# Patient Record
Sex: MALE | Race: WHITE | NOT HISPANIC OR LATINO | Employment: OTHER | URBAN - METROPOLITAN AREA
[De-identification: names, ages, dates, MRNs, and addresses within clinical notes are randomized per-mention and may not be internally consistent; named-entity substitution may affect disease eponyms.]

---

## 2017-07-20 RX ORDER — METOCLOPRAMIDE 10 MG/1
TABLET ORAL
Qty: 120 TABLET | Refills: 4 | OUTPATIENT
Start: 2017-07-20

## 2017-07-25 RX ORDER — METOCLOPRAMIDE 10 MG/1
TABLET ORAL
Qty: 120 TABLET | Refills: 4 | OUTPATIENT
Start: 2017-07-25

## 2018-03-14 PROBLEM — M54.50 CHRONIC LOW BACK PAIN: Status: ACTIVE | Noted: 2018-03-14

## 2018-03-14 PROBLEM — K21.9 GASTROESOPHAGEAL REFLUX DISEASE WITHOUT ESOPHAGITIS: Status: ACTIVE | Noted: 2018-03-14

## 2018-03-14 PROBLEM — F32.9 REACTIVE DEPRESSION: Status: ACTIVE | Noted: 2018-03-14

## 2018-03-14 PROBLEM — E66.812 CLASS 2 OBESITY DUE TO EXCESS CALORIES WITHOUT SERIOUS COMORBIDITY IN ADULT: Status: ACTIVE | Noted: 2018-03-14

## 2018-03-14 PROBLEM — I10 ESSENTIAL HYPERTENSION: Status: ACTIVE | Noted: 2018-03-14

## 2018-03-14 PROBLEM — E66.09 CLASS 2 OBESITY DUE TO EXCESS CALORIES WITHOUT SERIOUS COMORBIDITY IN ADULT: Status: ACTIVE | Noted: 2018-03-14

## 2018-03-14 PROBLEM — E78.2 MIXED HYPERLIPIDEMIA: Status: ACTIVE | Noted: 2018-03-14

## 2018-03-14 PROBLEM — E55.9 VITAMIN D DEFICIENCY: Status: ACTIVE | Noted: 2018-03-14

## 2018-03-14 PROBLEM — G89.29 CHRONIC LOW BACK PAIN: Status: ACTIVE | Noted: 2018-03-14

## 2018-03-14 NOTE — PROGRESS NOTES
Encounter Date:03/16/2018    Location: [facility]    Wilfred Kim MD    Patient ID: Bala Staley III is a 57 y.o. male,  and resident of [place], [place].    1960  Subjective:      Chief Complaint/Reason for visit:    Chief Complaint   Patient presents with   • Hypertension     New Patient    • Shortness of Breath     on exertion   • PVC'S   • Edema     Feet and lower legs       Problem List:  1. Hypertension  2. Dyspnea on Exertion  A. History of normal Cardiolite stress test, EF 66 %, 6/22/11, TY Leahy MD  3. Hyperlipidemia  4. Obesity- BMI 35.2  5. GERD  6. Depression  7. Back Pain  8. Vitamin D Deficiency  9. Snoring/ possible ZAHRA    HPI: Mr. Staley is a 57 yr old white male who presents in consultation today referred by Dr. Kim for further evaluation of his Hypertension and shortness of breath.  Mr. Staley states that for the last 4-5 months when he climbs steps or attempts to walk and talk at the same time he has dyspnea. He also has had lower extremity edema for several years.  He denies any chest pain or pressure with exertion.  He was diagnosed with Hypertension approximately 10 years ago.  He has been on Amlodipine, HCTZ and Cozaar 25 mg daily until 2-3 months ago.  At that time Dr. Kim increased his Cozaar to 50 mg daily with no significant improvement in his B/P readings. He states his B/P runs 150/90 on a regular basis.    Social History     Social History   • Marital status:      Spouse name: N/A   • Number of children: N/A   • Years of education: N/A     Occupational History   • Not on file.     Social History Main Topics   • Smoking status: Never Smoker   • Smokeless tobacco: Never Used   • Alcohol use 1.2 oz/week     2 Shots of liquor per week      Comment: per day   • Drug use: No   • Sexual activity: Defer     Other Topics Concern   • Not on file     Social History Narrative   • No narrative on file       family history includes Alzheimer's  disease in his mother; Heart attack in his father; Heart disease in his father; No Known Problems in his sister.     has a past medical history of Chronic low back pain (3/14/2018); Essential hypertension (3/14/2018); Gastroesophageal reflux disease without esophagitis (3/14/2018); Hyperlipidemia; Reactive depression (3/14/2018); and Vitamin D deficiency (3/14/2018).    No Known Allergies      Current Outpatient Prescriptions:   •  amLODIPine (NORVASC) 5 MG tablet, Daily., Disp: , Rfl: 0  •  aspirin 81 MG EC tablet, Take 81 mg by mouth Daily., Disp: , Rfl:   •  atorvastatin (LIPITOR) 40 MG tablet, Daily., Disp: , Rfl: 0  •  calcium polycarbophil (FIBERCON) 625 MG tablet, Take 625 mg by mouth 4 (Four) Times a Day., Disp: , Rfl:   •  cetirizine (zyrTEC) 10 MG tablet, Take 10 mg by mouth Daily., Disp: , Rfl:   •  lansoprazole (PREVACID) 15 MG capsule, Take 15 mg by mouth 2 (Two) Times a Day., Disp: , Rfl:   •  losartan (COZAAR) 50 MG tablet, Daily., Disp: , Rfl: 0  •  metoclopramide (REGLAN) 10 MG tablet, TAKE 1 TABLET BY MOUTH BEFORE MEALS AND AT BEDTIME, Disp: 120 tablet, Rfl: 6  •  RA VITAMIN B-12 TR 1000 MCG tablet controlled-release, Daily., Disp: , Rfl: 0  •  tadalafil (CIALIS) 20 MG tablet, Take 20 mg by mouth Daily As Needed for erectile dysfunction., Disp: , Rfl:   •  TRINTELLIX 10 MG tablet, Daily., Disp: , Rfl: 0  •  vitamin D (ERGOCALCIFEROL) 19474 units capsule capsule, 1 (One) Time Per Week., Disp: , Rfl: 0  •  chlorthalidone (HYGROTON) 25 MG tablet, Take 1 tablet by mouth Daily., Disp: 90 tablet, Rfl: 3    Review of Systems   Constitution: Positive for weight gain.   HENT: Negative.    Eyes: Negative.    Cardiovascular: Positive for dyspnea on exertion, irregular heartbeat, leg swelling and palpitations. Negative for chest pain, near-syncope, orthopnea, paroxysmal nocturnal dyspnea and syncope.   Respiratory: Positive for sleep disturbances due to breathing and snoring.    Endocrine: Negative.   "  Hematologic/Lymphatic: Negative.    Skin: Negative.    Musculoskeletal: Positive for arthritis and back pain.   Gastrointestinal: Positive for constipation and heartburn.   Genitourinary: Negative.    Neurological: Negative.    Psychiatric/Behavioral: Negative.    Allergic/Immunologic: Negative.      Vitals:    03/16/18 1111   BP: 150/88   BP Location: Right arm   Patient Position: Sitting   Pulse: 102   Weight: 115 kg (252 lb 9.6 oz)   Height: 180.3 cm (71\")     Objective:       Physical Exam   Constitutional: He is oriented to person, place, and time. He appears well-developed and well-nourished.   HENT:   Head: Normocephalic and atraumatic.   Neck: Normal range of motion. No thyromegaly present.   Cardiovascular: Normal rate, regular rhythm, normal heart sounds and intact distal pulses.  Exam reveals no gallop and no friction rub.    No murmur heard.  Pulmonary/Chest: Effort normal and breath sounds normal.   Abdominal: Soft. Bowel sounds are normal.   Musculoskeletal: Normal range of motion. He exhibits edema.   Neurological: He is alert and oriented to person, place, and time.   Skin: Skin is warm and dry.   Psychiatric: He has a normal mood and affect. His behavior is normal. Judgment and thought content normal.   Vitals reviewed.    Data Review:     ECG 12 Lead  Date/Time: 3/16/2018 11:28 AM  Performed by: RIKKI JORDAN  Authorized by: RIKKI JORDAN   Comparison: compared with previous ECG from 6/22/2011  Rhythm: sinus tachycardia  Rate: tachycardic  BPM: 102  QRS axis: normal  Clinical impression: normal ECG  Comments: Poor R wave progression             Assessment:      Diagnosis Plan   1. Essential hypertension  Discontinue HCTZ- Add Chlorthalidone   2. LAMAS (dyspnea on exertion)  Stress Test With Myocardial Perfusion (1 Day)   3. Mixed hyperlipidemia  Continue Statin   4. Class 2 obesity due to excess calories with serious comorbidity and body mass index (BMI) of 35.0 to 35.9 in adult  Diet and " Exercise   5. Snoring  Overnight Sleep Oximetry Study     Plan:   1. Discontinue HCTZ  2. Add Chlorthalidone 25 mg po daily.  3. Overnight Oximetry to evaluate for ZAHRA  4. Cardiolite GXT for further evaluation of exertional dyspnea.  5. Follow-up in 6 months  6. DASH Diet  7. Compression Stockings     Scribed for Pawel Leahy MD by Janneth Whelan. 3/16/2018  12:05 PM    I, Pawel Leahy MD, personally performed the services described in this documentation as scribed by the above named individual in my presence, and it is both accurate and complete.  3/16/2018  1:19 PM      Please note that portions of this note may have been completed with a voice recognition program. Efforts were made to edit the dictations, but occasionally words are mistranscribed.

## 2018-03-16 ENCOUNTER — OFFICE VISIT (OUTPATIENT)
Dept: CARDIOLOGY | Facility: CLINIC | Age: 58
End: 2018-03-16

## 2018-03-16 VITALS
HEART RATE: 102 BPM | SYSTOLIC BLOOD PRESSURE: 150 MMHG | WEIGHT: 252.6 LBS | BODY MASS INDEX: 35.36 KG/M2 | HEIGHT: 71 IN | DIASTOLIC BLOOD PRESSURE: 88 MMHG

## 2018-03-16 DIAGNOSIS — R06.83 SNORING: ICD-10-CM

## 2018-03-16 DIAGNOSIS — R06.09 DOE (DYSPNEA ON EXERTION): ICD-10-CM

## 2018-03-16 DIAGNOSIS — IMO0001 CLASS 2 OBESITY DUE TO EXCESS CALORIES WITH SERIOUS COMORBIDITY AND BODY MASS INDEX (BMI) OF 35.0 TO 35.9 IN ADULT: ICD-10-CM

## 2018-03-16 DIAGNOSIS — E78.2 MIXED HYPERLIPIDEMIA: ICD-10-CM

## 2018-03-16 DIAGNOSIS — I10 ESSENTIAL HYPERTENSION: Primary | ICD-10-CM

## 2018-03-16 PROCEDURE — 93000 ELECTROCARDIOGRAM COMPLETE: CPT | Performed by: INTERNAL MEDICINE

## 2018-03-16 PROCEDURE — 99244 OFF/OP CNSLTJ NEW/EST MOD 40: CPT | Performed by: INTERNAL MEDICINE

## 2018-03-16 RX ORDER — CHLORTHALIDONE 25 MG/1
25 TABLET ORAL DAILY
Qty: 90 TABLET | Refills: 3 | Status: SHIPPED | OUTPATIENT
Start: 2018-03-16 | End: 2018-11-14 | Stop reason: HOSPADM

## 2018-03-16 RX ORDER — LANSOPRAZOLE 15 MG/1
15 CAPSULE, DELAYED RELEASE ORAL 2 TIMES DAILY
COMMUNITY
End: 2018-11-13 | Stop reason: CLARIF

## 2018-03-16 RX ORDER — AMLODIPINE BESYLATE 5 MG/1
TABLET ORAL DAILY
Refills: 0 | COMMUNITY
Start: 2018-02-21 | End: 2018-11-13 | Stop reason: CLARIF

## 2018-03-16 RX ORDER — LOSARTAN POTASSIUM 50 MG/1
TABLET ORAL DAILY
Refills: 0 | COMMUNITY
Start: 2018-03-02 | End: 2018-11-13 | Stop reason: CLARIF

## 2018-03-16 RX ORDER — VORTIOXETINE 10 MG/1
TABLET, FILM COATED ORAL DAILY
Refills: 0 | COMMUNITY
Start: 2018-02-28 | End: 2019-01-13 | Stop reason: SDUPTHER

## 2018-03-16 RX ORDER — ATORVASTATIN CALCIUM 40 MG/1
TABLET, FILM COATED ORAL DAILY
Refills: 0 | COMMUNITY
Start: 2018-03-11 | End: 2019-11-13 | Stop reason: SDUPTHER

## 2018-03-16 RX ORDER — TADALAFIL 20 MG/1
20 TABLET ORAL DAILY PRN
COMMUNITY
End: 2018-11-13

## 2018-03-16 RX ORDER — PSYLLIUM HUSK 3.4 G/7G
POWDER ORAL DAILY
Refills: 0 | COMMUNITY
Start: 2018-03-11 | End: 2019-04-15 | Stop reason: SDUPTHER

## 2018-03-16 RX ORDER — CALCIUM POLYCARBOPHIL 625 MG 625 MG/1
625 TABLET ORAL 4 TIMES DAILY
COMMUNITY
End: 2020-09-18

## 2018-03-16 RX ORDER — HYDROCHLOROTHIAZIDE 25 MG/1
TABLET ORAL DAILY
Refills: 0 | COMMUNITY
Start: 2018-03-12 | End: 2018-03-16 | Stop reason: ALTCHOICE

## 2018-03-16 RX ORDER — ASPIRIN 81 MG/1
81 TABLET ORAL DAILY
COMMUNITY
End: 2023-02-08 | Stop reason: SDUPTHER

## 2018-03-16 RX ORDER — CETIRIZINE HYDROCHLORIDE 10 MG/1
10 TABLET ORAL DAILY
COMMUNITY
End: 2022-03-07

## 2018-03-16 RX ORDER — ERGOCALCIFEROL 1.25 MG/1
CAPSULE ORAL WEEKLY
Refills: 0 | COMMUNITY
Start: 2018-02-17 | End: 2019-06-03 | Stop reason: SDUPTHER

## 2018-04-10 ENCOUNTER — HOSPITAL ENCOUNTER (OUTPATIENT)
Dept: CARDIOLOGY | Facility: HOSPITAL | Age: 58
Discharge: HOME | End: 2018-04-10
Attending: INTERNAL MEDICINE

## 2018-04-10 ENCOUNTER — TELEPHONE (OUTPATIENT)
Dept: CARDIOLOGY | Facility: CLINIC | Age: 58
End: 2018-04-10

## 2018-04-10 VITALS
BODY MASS INDEX: 35.28 KG/M2 | HEIGHT: 71 IN | SYSTOLIC BLOOD PRESSURE: 138 MMHG | DIASTOLIC BLOOD PRESSURE: 80 MMHG | HEART RATE: 105 BPM | WEIGHT: 252 LBS

## 2018-04-10 DIAGNOSIS — R06.09 DOE (DYSPNEA ON EXERTION): ICD-10-CM

## 2018-04-10 LAB
BH CV STRESS BP STAGE 1: NORMAL
BH CV STRESS BP STAGE 2: NORMAL
BH CV STRESS DURATION MIN STAGE 1: 3
BH CV STRESS DURATION MIN STAGE 2: 3
BH CV STRESS DURATION MIN STAGE 3: 2
BH CV STRESS DURATION SEC STAGE 1: 0
BH CV STRESS DURATION SEC STAGE 2: 0
BH CV STRESS DURATION SEC STAGE 3: 40
BH CV STRESS GRADE STAGE 1: 10
BH CV STRESS GRADE STAGE 2: 12
BH CV STRESS GRADE STAGE 3: 14
BH CV STRESS HR STAGE 1: 122
BH CV STRESS HR STAGE 2: 136
BH CV STRESS HR STAGE 3: 144
BH CV STRESS METS STAGE 1: 5
BH CV STRESS METS STAGE 2: 7.5
BH CV STRESS METS STAGE 3: 10
BH CV STRESS O2 STAGE 1: 100
BH CV STRESS O2 STAGE 2: 100
BH CV STRESS O2 STAGE 3: 100
BH CV STRESS PROTOCOL 1: NORMAL
BH CV STRESS RECOVERY BP: NORMAL MMHG
BH CV STRESS RECOVERY HR: 104 BPM
BH CV STRESS SPEED STAGE 1: 1.7
BH CV STRESS SPEED STAGE 2: 2.5
BH CV STRESS SPEED STAGE 3: 3.4
BH CV STRESS STAGE 1: 1
BH CV STRESS STAGE 2: 2
BH CV STRESS STAGE 3: 3
LV EF NUC BP: 68 %
MAXIMAL PREDICTED HEART RATE: 163 BPM
PERCENT MAX PREDICTED HR: 88.34 %
STRESS BASELINE BP: NORMAL MMHG
STRESS BASELINE HR: 97 BPM
STRESS PERCENT HR: 104 %
STRESS POST ESTIMATED WORKLOAD: 9.8 METS
STRESS POST EXERCISE DUR MIN: 8 MIN
STRESS POST EXERCISE DUR SEC: 40 SEC
STRESS POST PEAK BP: NORMAL MMHG
STRESS POST PEAK HR: 144 BPM
STRESS TARGET HR: 139 BPM

## 2018-04-10 PROCEDURE — 78452 HT MUSCLE IMAGE SPECT MULT: CPT

## 2018-04-10 PROCEDURE — 0 TECHNETIUM SESTAMIBI: Performed by: INTERNAL MEDICINE

## 2018-04-10 PROCEDURE — 78452 HT MUSCLE IMAGE SPECT MULT: CPT | Performed by: INTERNAL MEDICINE

## 2018-04-10 PROCEDURE — 93017 CV STRESS TEST TRACING ONLY: CPT

## 2018-04-10 PROCEDURE — 93018 CV STRESS TEST I&R ONLY: CPT | Performed by: INTERNAL MEDICINE

## 2018-04-10 PROCEDURE — A9500 TC99M SESTAMIBI: HCPCS | Performed by: INTERNAL MEDICINE

## 2018-04-10 RX ADMIN — TECHNETIUM TC 99M SESTAMIBI 1 DOSE: 1 INJECTION INTRAVENOUS at 10:50

## 2018-04-10 RX ADMIN — TECHNETIUM TC 99M SESTAMIBI 1 DOSE: 1 INJECTION INTRAVENOUS at 13:45

## 2018-04-10 NOTE — TELEPHONE ENCOUNTER
Called patient to let him know that his overnight ox did not suggest he is high risk for sleep apnea and no further testing is needed at this time. A letter with these results was mailed on march 30th so we apologize if he did not receive

## 2018-11-12 ENCOUNTER — HOSPITAL ENCOUNTER (OUTPATIENT)
Facility: HOSPITAL | Age: 58
Setting detail: OBSERVATION
Discharge: HOME | End: 2018-11-14
Attending: EMERGENCY MEDICINE | Admitting: INTERNAL MEDICINE

## 2018-11-12 ENCOUNTER — APPOINTMENT (OUTPATIENT)
Dept: GENERAL RADIOLOGY | Facility: HOSPITAL | Age: 58
End: 2018-11-12

## 2018-11-12 DIAGNOSIS — R55 SYNCOPE, UNSPECIFIED SYNCOPE TYPE: ICD-10-CM

## 2018-11-12 DIAGNOSIS — R05.9 COUGH: Primary | ICD-10-CM

## 2018-11-12 DIAGNOSIS — N28.9 RENAL INSUFFICIENCY: ICD-10-CM

## 2018-11-12 LAB
ALBUMIN SERPL-MCNC: 4.47 G/DL (ref 3.2–4.8)
ALBUMIN/GLOB SERPL: 1.8 G/DL (ref 1.5–2.5)
ALP SERPL-CCNC: 101 U/L (ref 25–100)
ALT SERPL W P-5'-P-CCNC: 64 U/L (ref 7–40)
ANION GAP SERPL CALCULATED.3IONS-SCNC: 12 MMOL/L (ref 3–11)
AST SERPL-CCNC: 71 U/L (ref 0–33)
BASOPHILS # BLD AUTO: 0.03 10*3/MM3 (ref 0–0.2)
BASOPHILS NFR BLD AUTO: 0.7 % (ref 0–1)
BILIRUB SERPL-MCNC: 0.4 MG/DL (ref 0.3–1.2)
BUN BLD-MCNC: 17 MG/DL (ref 9–23)
BUN/CREAT SERPL: 13 (ref 7–25)
CALCIUM SPEC-SCNC: 9.2 MG/DL (ref 8.7–10.4)
CHLORIDE SERPL-SCNC: 100 MMOL/L (ref 99–109)
CO2 SERPL-SCNC: 22 MMOL/L (ref 20–31)
CREAT BLD-MCNC: 1.31 MG/DL (ref 0.6–1.3)
DEPRECATED RDW RBC AUTO: 51.5 FL (ref 37–54)
EOSINOPHIL # BLD AUTO: 0.12 10*3/MM3 (ref 0–0.3)
EOSINOPHIL NFR BLD AUTO: 2.6 % (ref 0–3)
ERYTHROCYTE [DISTWIDTH] IN BLOOD BY AUTOMATED COUNT: 17.7 % (ref 11.3–14.5)
FLUAV AG NPH QL: NEGATIVE
FLUBV AG NPH QL IA: NEGATIVE
GFR SERPL CREATININE-BSD FRML MDRD: 56 ML/MIN/1.73
GLOBULIN UR ELPH-MCNC: 2.4 GM/DL
GLUCOSE BLD-MCNC: 103 MG/DL (ref 70–100)
HCT VFR BLD AUTO: 37.9 % (ref 38.9–50.9)
HGB BLD-MCNC: 12 G/DL (ref 13.1–17.5)
HOLD SPECIMEN: NORMAL
HOLD SPECIMEN: NORMAL
IMM GRANULOCYTES # BLD: 0 10*3/MM3 (ref 0–0.03)
IMM GRANULOCYTES NFR BLD: 0 % (ref 0–0.6)
LYMPHOCYTES # BLD AUTO: 0.65 10*3/MM3 (ref 0.6–4.8)
LYMPHOCYTES NFR BLD AUTO: 14.3 % (ref 24–44)
MCH RBC QN AUTO: 25.3 PG (ref 27–31)
MCHC RBC AUTO-ENTMCNC: 31.7 G/DL (ref 32–36)
MCV RBC AUTO: 79.8 FL (ref 80–99)
MONOCYTES # BLD AUTO: 0.67 10*3/MM3 (ref 0–1)
MONOCYTES NFR BLD AUTO: 14.8 % (ref 0–12)
NEUTROPHILS # BLD AUTO: 3.07 10*3/MM3 (ref 1.5–8.3)
NEUTROPHILS NFR BLD AUTO: 67.6 % (ref 41–71)
PLATELET # BLD AUTO: 167 10*3/MM3 (ref 150–450)
PMV BLD AUTO: 10.6 FL (ref 6–12)
POTASSIUM BLD-SCNC: 3.2 MMOL/L (ref 3.5–5.5)
PROT SERPL-MCNC: 6.9 G/DL (ref 5.7–8.2)
RBC # BLD AUTO: 4.75 10*6/MM3 (ref 4.2–5.76)
SODIUM BLD-SCNC: 134 MMOL/L (ref 132–146)
WBC NRBC COR # BLD: 4.54 10*3/MM3 (ref 3.5–10.8)
WHOLE BLOOD HOLD SPECIMEN: NORMAL
WHOLE BLOOD HOLD SPECIMEN: NORMAL

## 2018-11-12 PROCEDURE — 99284 EMERGENCY DEPT VISIT MOD MDM: CPT

## 2018-11-12 PROCEDURE — 93005 ELECTROCARDIOGRAM TRACING: CPT

## 2018-11-12 PROCEDURE — 36415 COLL VENOUS BLD VENIPUNCTURE: CPT

## 2018-11-12 PROCEDURE — 87798 DETECT AGENT NOS DNA AMP: CPT | Performed by: EMERGENCY MEDICINE

## 2018-11-12 PROCEDURE — 96360 HYDRATION IV INFUSION INIT: CPT

## 2018-11-12 PROCEDURE — 93005 ELECTROCARDIOGRAM TRACING: CPT | Performed by: EMERGENCY MEDICINE

## 2018-11-12 PROCEDURE — 84484 ASSAY OF TROPONIN QUANT: CPT | Performed by: EMERGENCY MEDICINE

## 2018-11-12 PROCEDURE — 80053 COMPREHEN METABOLIC PANEL: CPT | Performed by: EMERGENCY MEDICINE

## 2018-11-12 PROCEDURE — 85025 COMPLETE CBC W/AUTO DIFF WBC: CPT | Performed by: EMERGENCY MEDICINE

## 2018-11-12 PROCEDURE — 71045 X-RAY EXAM CHEST 1 VIEW: CPT

## 2018-11-12 PROCEDURE — 87804 INFLUENZA ASSAY W/OPTIC: CPT | Performed by: EMERGENCY MEDICINE

## 2018-11-12 RX ORDER — ACETAMINOPHEN 500 MG
1000 TABLET ORAL ONCE
Status: COMPLETED | OUTPATIENT
Start: 2018-11-12 | End: 2018-11-12

## 2018-11-12 RX ORDER — BENZONATATE 100 MG/1
200 CAPSULE ORAL ONCE
Status: COMPLETED | OUTPATIENT
Start: 2018-11-12 | End: 2018-11-12

## 2018-11-12 RX ORDER — SODIUM CHLORIDE 0.9 % (FLUSH) 0.9 %
10 SYRINGE (ML) INJECTION AS NEEDED
Status: DISCONTINUED | OUTPATIENT
Start: 2018-11-12 | End: 2018-11-14 | Stop reason: HOSPADM

## 2018-11-12 RX ADMIN — BENZONATATE 200 MG: 100 CAPSULE ORAL at 22:44

## 2018-11-12 RX ADMIN — ACETAMINOPHEN 1000 MG: 500 TABLET, FILM COATED ORAL at 22:43

## 2018-11-12 RX ADMIN — SODIUM CHLORIDE 1000 ML: 9 INJECTION, SOLUTION INTRAVENOUS at 22:41

## 2018-11-13 ENCOUNTER — APPOINTMENT (OUTPATIENT)
Dept: NEUROLOGY | Facility: HOSPITAL | Age: 58
End: 2018-11-13
Attending: FAMILY MEDICINE

## 2018-11-13 ENCOUNTER — APPOINTMENT (OUTPATIENT)
Dept: CT IMAGING | Facility: HOSPITAL | Age: 58
End: 2018-11-13

## 2018-11-13 ENCOUNTER — APPOINTMENT (OUTPATIENT)
Dept: CARDIOLOGY | Facility: HOSPITAL | Age: 58
End: 2018-11-13
Attending: FAMILY MEDICINE

## 2018-11-13 PROBLEM — R55 SYNCOPE, TUSSIVE: Status: ACTIVE | Noted: 2018-11-13

## 2018-11-13 PROBLEM — R05.4 SYNCOPE, TUSSIVE: Status: ACTIVE | Noted: 2018-11-13

## 2018-11-13 PROBLEM — R05.4 TUSSIVE SYNCOPE: Status: ACTIVE | Noted: 2018-11-13

## 2018-11-13 PROBLEM — R55 TUSSIVE SYNCOPE: Status: ACTIVE | Noted: 2018-11-13

## 2018-11-13 PROBLEM — E87.6 HYPOKALEMIA: Status: ACTIVE | Noted: 2018-11-13

## 2018-11-13 LAB
ANION GAP SERPL CALCULATED.3IONS-SCNC: 9 MMOL/L (ref 3–11)
BH CV ECHO MEAS - AO ROOT AREA (BSA CORRECTED): 1.1
BH CV ECHO MEAS - AO ROOT AREA: 4.8 CM^2
BH CV ECHO MEAS - AO ROOT DIAM: 2.5 CM
BH CV ECHO MEAS - BSA(HAYCOCK): 2.4 M^2
BH CV ECHO MEAS - BSA: 2.3 M^2
BH CV ECHO MEAS - BZI_BMI: 35.2 KILOGRAMS/M^2
BH CV ECHO MEAS - BZI_METRIC_HEIGHT: 177.8 CM
BH CV ECHO MEAS - BZI_METRIC_WEIGHT: 111.1 KG
BH CV ECHO MEAS - EDV(CUBED): 130.7 ML
BH CV ECHO MEAS - EDV(TEICH): 122.4 ML
BH CV ECHO MEAS - EF(CUBED): 75.5 %
BH CV ECHO MEAS - EF(TEICH): 67.1 %
BH CV ECHO MEAS - ESV(CUBED): 32 ML
BH CV ECHO MEAS - ESV(TEICH): 40.2 ML
BH CV ECHO MEAS - FS: 37.4 %
BH CV ECHO MEAS - IVS/LVPW: 0.99
BH CV ECHO MEAS - IVSD: 1.2 CM
BH CV ECHO MEAS - LA DIMENSION: 3.9 CM
BH CV ECHO MEAS - LA/AO: 1.6
BH CV ECHO MEAS - LAD MAJOR: 5 CM
BH CV ECHO MEAS - LAT PEAK E' VEL: 10.7 CM/SEC
BH CV ECHO MEAS - LATERAL E/E' RATIO: 8.8
BH CV ECHO MEAS - LV MASS(C)D: 246 GRAMS
BH CV ECHO MEAS - LV MASS(C)DI: 108.1 GRAMS/M^2
BH CV ECHO MEAS - LVIDD: 5.1 CM
BH CV ECHO MEAS - LVIDS: 3.2 CM
BH CV ECHO MEAS - LVPWD: 1.2 CM
BH CV ECHO MEAS - MED PEAK E' VEL: 7.4 CM/SEC
BH CV ECHO MEAS - MEDIAL E/E' RATIO: 12.7
BH CV ECHO MEAS - MV A MAX VEL: 83.9 CM/SEC
BH CV ECHO MEAS - MV DEC SLOPE: 351.7 CM/SEC^2
BH CV ECHO MEAS - MV DEC TIME: 0.21 SEC
BH CV ECHO MEAS - MV E MAX VEL: 95.8 CM/SEC
BH CV ECHO MEAS - MV E/A: 1.1
BH CV ECHO MEAS - MV P1/2T MAX VEL: 95.6 CM/SEC
BH CV ECHO MEAS - MV P1/2T: 79.6 MSEC
BH CV ECHO MEAS - MVA P1/2T LCG: 2.3 CM^2
BH CV ECHO MEAS - MVA(P1/2T): 2.8 CM^2
BH CV ECHO MEAS - PA ACC SLOPE: 730.3 CM/SEC^2
BH CV ECHO MEAS - PA ACC TIME: 0.11 SEC
BH CV ECHO MEAS - PA PR(ACCEL): 27.9 MMHG
BH CV ECHO MEAS - PI END-D VEL: 111.8 CM/SEC
BH CV ECHO MEAS - RV MAX PG: 2.1 MMHG
BH CV ECHO MEAS - RV V1 MAX: 72.1 CM/SEC
BH CV ECHO MEAS - SI(CUBED): 43.4 ML/M^2
BH CV ECHO MEAS - SI(TEICH): 36.1 ML/M^2
BH CV ECHO MEAS - SV(CUBED): 98.7 ML
BH CV ECHO MEAS - SV(TEICH): 82.2 ML
BH CV ECHO MEAS - TAPSE (>1.6): 2.8 CM2
BH CV ECHO MEASUREMENTS AVERAGE E/E' RATIO: 10.59
BH CV XLRA - RV BASE: 4.9 CM
BH CV XLRA - RV LENGTH: 8.6 CM
BH CV XLRA - RV MID: 4.3 CM
BH CV XLRA - TDI S': 16.8 CM/SEC
BH CV XLRA MEAS LEFT CCA RATIO VEL: 93.3 CM/SEC
BH CV XLRA MEAS LEFT DIST CCA EDV: 21.6 CM/SEC
BH CV XLRA MEAS LEFT DIST CCA PSV: 94.3 CM/SEC
BH CV XLRA MEAS LEFT DIST ICA EDV: 30.2 CM/SEC
BH CV XLRA MEAS LEFT DIST ICA PSV: 67.9 CM/SEC
BH CV XLRA MEAS LEFT ICA RATIO VEL: 84.2 CM/SEC
BH CV XLRA MEAS LEFT ICA/CCA RATIO: 0.9
BH CV XLRA MEAS LEFT MID CCA EDV: 20.1 CM/SEC
BH CV XLRA MEAS LEFT MID CCA PSV: 134.5 CM/SEC
BH CV XLRA MEAS LEFT MID ICA EDV: 29.5 CM/SEC
BH CV XLRA MEAS LEFT MID ICA PSV: 84.9 CM/SEC
BH CV XLRA MEAS LEFT PROX CCA EDV: 33.9 CM/SEC
BH CV XLRA MEAS LEFT PROX CCA PSV: 193.6 CM/SEC
BH CV XLRA MEAS LEFT PROX ECA PSV: 144.4 CM/SEC
BH CV XLRA MEAS LEFT PROX ICA EDV: 22.8 CM/SEC
BH CV XLRA MEAS LEFT PROX ICA PSV: 66.8 CM/SEC
BH CV XLRA MEAS LEFT PROX SCLA PSV: 198.6 CM/SEC
BH CV XLRA MEAS LEFT VERTEBRAL A PSV: 26.9 CM/SEC
BH CV XLRA MEAS RIGHT CCA RATIO VEL: 94.3 CM/SEC
BH CV XLRA MEAS RIGHT DIST CCA EDV: 34.9 CM/SEC
BH CV XLRA MEAS RIGHT DIST CCA PSV: 95.2 CM/SEC
BH CV XLRA MEAS RIGHT DIST ICA EDV: 28.6 CM/SEC
BH CV XLRA MEAS RIGHT DIST ICA PSV: 64.9 CM/SEC
BH CV XLRA MEAS RIGHT ICA RATIO VEL: 88.7 CM/SEC
BH CV XLRA MEAS RIGHT ICA/CCA RATIO: 0.9
BH CV XLRA MEAS RIGHT MID CCA EDV: 24.6 CM/SEC
BH CV XLRA MEAS RIGHT MID CCA PSV: 113.9 CM/SEC
BH CV XLRA MEAS RIGHT MID ICA EDV: 35.7 CM/SEC
BH CV XLRA MEAS RIGHT MID ICA PSV: 72.6 CM/SEC
BH CV XLRA MEAS RIGHT PROX CCA EDV: 16.7 CM/SEC
BH CV XLRA MEAS RIGHT PROX CCA PSV: 150.3 CM/SEC
BH CV XLRA MEAS RIGHT PROX ECA PSV: 137.1 CM/SEC
BH CV XLRA MEAS RIGHT PROX ICA EDV: 25.8 CM/SEC
BH CV XLRA MEAS RIGHT PROX ICA PSV: 89.4 CM/SEC
BH CV XLRA MEAS RIGHT PROX SCLA PSV: 124.7 CM/SEC
BH CV XLRA MEAS RIGHT VERTEBRAL A PSV: 42.5 CM/SEC
BUN BLD-MCNC: 14 MG/DL (ref 9–23)
BUN/CREAT SERPL: 13.3 (ref 7–25)
CALCIUM SPEC-SCNC: 8.9 MG/DL (ref 8.7–10.4)
CHLORIDE SERPL-SCNC: 97 MMOL/L (ref 99–109)
CO2 SERPL-SCNC: 27 MMOL/L (ref 20–31)
CREAT BLD-MCNC: 1.05 MG/DL (ref 0.6–1.3)
GFR SERPL CREATININE-BSD FRML MDRD: 73 ML/MIN/1.73
GLUCOSE BLD-MCNC: 98 MG/DL (ref 70–100)
HBA1C MFR BLD: 5.7 % (ref 4.8–5.6)
LEFT ATRIUM VOLUME INDEX: 21.1 ML/M^2
LEFT ATRIUM VOLUME: 48 CM3
MAGNESIUM SERPL-MCNC: 1.7 MG/DL (ref 1.3–2.7)
MAGNESIUM SERPL-MCNC: 1.8 MG/DL (ref 1.3–2.7)
MAXIMAL PREDICTED HEART RATE: 162 BPM
POTASSIUM BLD-SCNC: 3.2 MMOL/L (ref 3.5–5.5)
POTASSIUM BLD-SCNC: 3.7 MMOL/L (ref 3.5–5.5)
S PYO AG THROAT QL: NEGATIVE
SODIUM BLD-SCNC: 133 MMOL/L (ref 132–146)
STRESS TARGET HR: 138 BPM
TROPONIN I SERPL-MCNC: 0.01 NG/ML
TROPONIN I SERPL-MCNC: 0.02 NG/ML
TSH SERPL DL<=0.05 MIU/L-ACNC: 2.28 MIU/ML (ref 0.35–5.35)

## 2018-11-13 PROCEDURE — 83036 HEMOGLOBIN GLYCOSYLATED A1C: CPT | Performed by: FAMILY MEDICINE

## 2018-11-13 PROCEDURE — 25010000002 AZITHROMYCIN: Performed by: FAMILY MEDICINE

## 2018-11-13 PROCEDURE — 87254 VIRUS INOCULATION SHELL VIA: CPT | Performed by: FAMILY MEDICINE

## 2018-11-13 PROCEDURE — 84484 ASSAY OF TROPONIN QUANT: CPT | Performed by: FAMILY MEDICINE

## 2018-11-13 PROCEDURE — 84484 ASSAY OF TROPONIN QUANT: CPT | Performed by: EMERGENCY MEDICINE

## 2018-11-13 PROCEDURE — 83735 ASSAY OF MAGNESIUM: CPT | Performed by: INTERNAL MEDICINE

## 2018-11-13 PROCEDURE — 99219 PR INITIAL OBSERVATION CARE/DAY 50 MINUTES: CPT | Performed by: FAMILY MEDICINE

## 2018-11-13 PROCEDURE — 25010000002 IOPAMIDOL 61 % SOLUTION: Performed by: FAMILY MEDICINE

## 2018-11-13 PROCEDURE — G0378 HOSPITAL OBSERVATION PER HR: HCPCS

## 2018-11-13 PROCEDURE — 87880 STREP A ASSAY W/OPTIC: CPT | Performed by: INTERNAL MEDICINE

## 2018-11-13 PROCEDURE — 84132 ASSAY OF SERUM POTASSIUM: CPT | Performed by: INTERNAL MEDICINE

## 2018-11-13 PROCEDURE — 93306 TTE W/DOPPLER COMPLETE: CPT | Performed by: INTERNAL MEDICINE

## 2018-11-13 PROCEDURE — 93005 ELECTROCARDIOGRAM TRACING: CPT | Performed by: EMERGENCY MEDICINE

## 2018-11-13 PROCEDURE — 80048 BASIC METABOLIC PNL TOTAL CA: CPT | Performed by: INTERNAL MEDICINE

## 2018-11-13 PROCEDURE — 93880 EXTRACRANIAL BILAT STUDY: CPT | Performed by: INTERNAL MEDICINE

## 2018-11-13 PROCEDURE — 71260 CT THORAX DX C+: CPT

## 2018-11-13 PROCEDURE — 95816 EEG AWAKE AND DROWSY: CPT

## 2018-11-13 PROCEDURE — 87081 CULTURE SCREEN ONLY: CPT | Performed by: INTERNAL MEDICINE

## 2018-11-13 PROCEDURE — 93306 TTE W/DOPPLER COMPLETE: CPT

## 2018-11-13 PROCEDURE — 25010000002 HEPARIN (PORCINE) PER 1000 UNITS: Performed by: FAMILY MEDICINE

## 2018-11-13 PROCEDURE — 93880 EXTRACRANIAL BILAT STUDY: CPT

## 2018-11-13 PROCEDURE — 83735 ASSAY OF MAGNESIUM: CPT | Performed by: FAMILY MEDICINE

## 2018-11-13 PROCEDURE — 84443 ASSAY THYROID STIM HORMONE: CPT | Performed by: FAMILY MEDICINE

## 2018-11-13 PROCEDURE — 96372 THER/PROPH/DIAG INJ SC/IM: CPT

## 2018-11-13 RX ORDER — ESOMEPRAZOLE MAGNESIUM 40 MG/1
1 FOR SUSPENSION ORAL NIGHTLY
COMMUNITY

## 2018-11-13 RX ORDER — BENZONATATE 100 MG/1
200 CAPSULE ORAL ONCE
Status: COMPLETED | OUTPATIENT
Start: 2018-11-13 | End: 2018-11-13

## 2018-11-13 RX ORDER — POTASSIUM CHLORIDE 1.5 G/1.77G
40 POWDER, FOR SOLUTION ORAL AS NEEDED
Status: DISCONTINUED | OUTPATIENT
Start: 2018-11-13 | End: 2018-11-14 | Stop reason: HOSPADM

## 2018-11-13 RX ORDER — AZITHROMYCIN 250 MG/1
250 TABLET, FILM COATED ORAL
Status: DISCONTINUED | OUTPATIENT
Start: 2018-11-14 | End: 2018-11-14 | Stop reason: HOSPADM

## 2018-11-13 RX ORDER — POTASSIUM CHLORIDE 750 MG/1
40 CAPSULE, EXTENDED RELEASE ORAL AS NEEDED
Status: DISCONTINUED | OUTPATIENT
Start: 2018-11-13 | End: 2018-11-14 | Stop reason: HOSPADM

## 2018-11-13 RX ORDER — MAGNESIUM SULFATE HEPTAHYDRATE 40 MG/ML
2 INJECTION, SOLUTION INTRAVENOUS AS NEEDED
Status: DISCONTINUED | OUTPATIENT
Start: 2018-11-13 | End: 2018-11-14 | Stop reason: HOSPADM

## 2018-11-13 RX ORDER — BENZONATATE 100 MG/1
200 CAPSULE ORAL 3 TIMES DAILY
Status: DISCONTINUED | OUTPATIENT
Start: 2018-11-13 | End: 2018-11-14 | Stop reason: HOSPADM

## 2018-11-13 RX ORDER — SODIUM CHLORIDE 0.9 % (FLUSH) 0.9 %
3 SYRINGE (ML) INJECTION EVERY 12 HOURS SCHEDULED
Status: DISCONTINUED | OUTPATIENT
Start: 2018-11-13 | End: 2018-11-14 | Stop reason: HOSPADM

## 2018-11-13 RX ORDER — OLMESARTAN MEDOXOMIL 20 MG/1
20 TABLET ORAL DAILY
COMMUNITY
End: 2019-05-02 | Stop reason: SDUPTHER

## 2018-11-13 RX ORDER — LOSARTAN POTASSIUM 50 MG/1
50 TABLET ORAL
Status: DISCONTINUED | OUTPATIENT
Start: 2018-11-13 | End: 2018-11-14 | Stop reason: HOSPADM

## 2018-11-13 RX ORDER — HEPARIN SODIUM 5000 [USP'U]/ML
5000 INJECTION, SOLUTION INTRAVENOUS; SUBCUTANEOUS EVERY 8 HOURS SCHEDULED
Status: DISCONTINUED | OUTPATIENT
Start: 2018-11-13 | End: 2018-11-14 | Stop reason: HOSPADM

## 2018-11-13 RX ORDER — SODIUM CHLORIDE 9 MG/ML
50 INJECTION, SOLUTION INTRAVENOUS CONTINUOUS
Status: ACTIVE | OUTPATIENT
Start: 2018-11-13 | End: 2018-11-13

## 2018-11-13 RX ORDER — LOSARTAN POTASSIUM 25 MG/1
25 TABLET ORAL
Status: DISCONTINUED | OUTPATIENT
Start: 2018-11-13 | End: 2018-11-13

## 2018-11-13 RX ORDER — PANTOPRAZOLE SODIUM 40 MG/1
40 TABLET, DELAYED RELEASE ORAL
Status: DISCONTINUED | OUTPATIENT
Start: 2018-11-13 | End: 2018-11-14 | Stop reason: HOSPADM

## 2018-11-13 RX ORDER — POTASSIUM CHLORIDE 7.45 MG/ML
10 INJECTION INTRAVENOUS
Status: DISCONTINUED | OUTPATIENT
Start: 2018-11-13 | End: 2018-11-14 | Stop reason: HOSPADM

## 2018-11-13 RX ORDER — SODIUM CHLORIDE 0.9 % (FLUSH) 0.9 %
3-10 SYRINGE (ML) INJECTION AS NEEDED
Status: DISCONTINUED | OUTPATIENT
Start: 2018-11-13 | End: 2018-11-14 | Stop reason: HOSPADM

## 2018-11-13 RX ORDER — MAGNESIUM SULFATE HEPTAHYDRATE 40 MG/ML
4 INJECTION, SOLUTION INTRAVENOUS AS NEEDED
Status: DISCONTINUED | OUTPATIENT
Start: 2018-11-13 | End: 2018-11-14 | Stop reason: HOSPADM

## 2018-11-13 RX ADMIN — IOPAMIDOL 90 ML: 612 INJECTION, SOLUTION INTRAVENOUS at 04:20

## 2018-11-13 RX ADMIN — HYDROCODONE POLISTIREX AND CHLORPHENIRAMINE POLISTIREX 5 ML: 10; 8 SUSPENSION, EXTENDED RELEASE ORAL at 20:52

## 2018-11-13 RX ADMIN — SODIUM CHLORIDE 500 ML: 9 INJECTION, SOLUTION INTRAVENOUS at 13:12

## 2018-11-13 RX ADMIN — SODIUM CHLORIDE 50 ML/HR: 9 INJECTION, SOLUTION INTRAVENOUS at 13:13

## 2018-11-13 RX ADMIN — HEPARIN SODIUM 5000 UNITS: 5000 INJECTION, SOLUTION INTRAVENOUS; SUBCUTANEOUS at 20:52

## 2018-11-13 RX ADMIN — POTASSIUM CHLORIDE 40 MEQ: 1.5 POWDER, FOR SOLUTION ORAL at 09:02

## 2018-11-13 RX ADMIN — HEPARIN SODIUM 5000 UNITS: 5000 INJECTION, SOLUTION INTRAVENOUS; SUBCUTANEOUS at 06:16

## 2018-11-13 RX ADMIN — PANTOPRAZOLE SODIUM 40 MG: 40 TABLET, DELAYED RELEASE ORAL at 13:27

## 2018-11-13 RX ADMIN — POTASSIUM CHLORIDE 40 MEQ: 1.5 POWDER, FOR SOLUTION ORAL at 13:12

## 2018-11-13 RX ADMIN — BENZONATATE 200 MG: 100 CAPSULE ORAL at 15:14

## 2018-11-13 RX ADMIN — BENZONATATE 200 MG: 100 CAPSULE ORAL at 20:53

## 2018-11-13 RX ADMIN — POTASSIUM CHLORIDE 40 MEQ: 1.5 POWDER, FOR SOLUTION ORAL at 04:32

## 2018-11-13 RX ADMIN — HEPARIN SODIUM 5000 UNITS: 5000 INJECTION, SOLUTION INTRAVENOUS; SUBCUTANEOUS at 13:12

## 2018-11-13 RX ADMIN — Medication 3 ML: at 20:53

## 2018-11-13 RX ADMIN — LOSARTAN POTASSIUM 50 MG: 50 TABLET ORAL at 13:27

## 2018-11-13 RX ADMIN — BENZONATATE 200 MG: 100 CAPSULE ORAL at 10:27

## 2018-11-13 RX ADMIN — AZITHROMYCIN MONOHYDRATE 500 MG: 500 INJECTION, POWDER, LYOPHILIZED, FOR SOLUTION INTRAVENOUS at 04:33

## 2018-11-14 VITALS
HEART RATE: 77 BPM | TEMPERATURE: 99.3 F | SYSTOLIC BLOOD PRESSURE: 163 MMHG | DIASTOLIC BLOOD PRESSURE: 100 MMHG | WEIGHT: 245 LBS | RESPIRATION RATE: 16 BRPM | HEIGHT: 71 IN | OXYGEN SATURATION: 94 % | BODY MASS INDEX: 34.3 KG/M2

## 2018-11-14 LAB
ANION GAP SERPL CALCULATED.3IONS-SCNC: 6 MMOL/L (ref 3–11)
BUN BLD-MCNC: 11 MG/DL (ref 9–23)
BUN/CREAT SERPL: 10.2 (ref 7–25)
CALCIUM SPEC-SCNC: 9.1 MG/DL (ref 8.7–10.4)
CHLORIDE SERPL-SCNC: 99 MMOL/L (ref 99–109)
CO2 SERPL-SCNC: 29 MMOL/L (ref 20–31)
CREAT BLD-MCNC: 1.08 MG/DL (ref 0.6–1.3)
GFR SERPL CREATININE-BSD FRML MDRD: 70 ML/MIN/1.73
GLUCOSE BLD-MCNC: 97 MG/DL (ref 70–100)
MAGNESIUM SERPL-MCNC: 1.9 MG/DL (ref 1.3–2.7)
POTASSIUM BLD-SCNC: 3.5 MMOL/L (ref 3.5–5.5)
SODIUM BLD-SCNC: 134 MMOL/L (ref 132–146)

## 2018-11-14 PROCEDURE — G0378 HOSPITAL OBSERVATION PER HR: HCPCS

## 2018-11-14 PROCEDURE — 80048 BASIC METABOLIC PNL TOTAL CA: CPT | Performed by: INTERNAL MEDICINE

## 2018-11-14 PROCEDURE — 99217 PR OBSERVATION CARE DISCHARGE MANAGEMENT: CPT | Performed by: INTERNAL MEDICINE

## 2018-11-14 PROCEDURE — 25010000002 HEPARIN (PORCINE) PER 1000 UNITS: Performed by: FAMILY MEDICINE

## 2018-11-14 PROCEDURE — 83735 ASSAY OF MAGNESIUM: CPT | Performed by: INTERNAL MEDICINE

## 2018-11-14 PROCEDURE — 96372 THER/PROPH/DIAG INJ SC/IM: CPT

## 2018-11-14 RX ORDER — AZITHROMYCIN 250 MG/1
TABLET, FILM COATED ORAL
Qty: 4 TABLET | Refills: 0 | Status: SHIPPED | OUTPATIENT
Start: 2018-11-15 | End: 2019-02-15

## 2018-11-14 RX ADMIN — AZITHROMYCIN 250 MG: 250 TABLET, FILM COATED ORAL at 06:08

## 2018-11-14 RX ADMIN — HEPARIN SODIUM 5000 UNITS: 5000 INJECTION, SOLUTION INTRAVENOUS; SUBCUTANEOUS at 06:08

## 2018-11-14 RX ADMIN — Medication 3 ML: at 07:45

## 2018-11-14 RX ADMIN — LOSARTAN POTASSIUM 50 MG: 50 TABLET ORAL at 07:45

## 2018-11-14 RX ADMIN — PANTOPRAZOLE SODIUM 40 MG: 40 TABLET, DELAYED RELEASE ORAL at 06:08

## 2018-11-14 RX ADMIN — BENZONATATE 200 MG: 100 CAPSULE ORAL at 07:45

## 2018-11-14 RX ADMIN — POTASSIUM CHLORIDE 40 MEQ: 1.5 POWDER, FOR SOLUTION ORAL at 09:31

## 2018-11-15 LAB — BACTERIA SPEC AEROBE CULT: NORMAL

## 2018-11-16 LAB
FLUAV AG NPH QL: NEGATIVE
FLUBV AG NPH QL: NEGATIVE
HADV SPEC QL CULT: NEGATIVE
HPIV1 RNA SPEC QL NAA+PROBE: NEGATIVE
HPIV2 SPEC QL CULT: NEGATIVE
HPIV3 SPEC QL CULT: NEGATIVE
RSV AG SPEC QL: POSITIVE

## 2018-11-17 LAB
B PARAPERT DNA SPEC QL NAA+PROBE: NEGATIVE
B PERT DNA SPEC QL NAA+PROBE: NEGATIVE

## 2019-01-14 RX ORDER — VORTIOXETINE 10 MG/1
TABLET, FILM COATED ORAL
Qty: 30 TABLET | Refills: 0 | Status: SHIPPED | OUTPATIENT
Start: 2019-01-14 | End: 2019-02-10 | Stop reason: SDUPTHER

## 2019-01-17 ENCOUNTER — LAB REQUISITION (OUTPATIENT)
Dept: LAB | Facility: HOSPITAL | Age: 59
End: 2019-01-17

## 2019-01-17 ENCOUNTER — OUTSIDE FACILITY SERVICE (OUTPATIENT)
Dept: GASTROENTEROLOGY | Facility: CLINIC | Age: 59
End: 2019-01-17

## 2019-01-17 DIAGNOSIS — K21.00 GASTRO-ESOPHAGEAL REFLUX DISEASE WITH ESOPHAGITIS: ICD-10-CM

## 2019-01-17 DIAGNOSIS — K20.0 EOSINOPHILIC ESOPHAGITIS: ICD-10-CM

## 2019-01-17 DIAGNOSIS — K44.9 DIAPHRAGMATIC HERNIA WITHOUT OBSTRUCTION OR GANGRENE: ICD-10-CM

## 2019-01-17 PROCEDURE — 88305 TISSUE EXAM BY PATHOLOGIST: CPT | Performed by: INTERNAL MEDICINE

## 2019-01-17 PROCEDURE — 43239 EGD BIOPSY SINGLE/MULTIPLE: CPT | Performed by: INTERNAL MEDICINE

## 2019-01-17 PROCEDURE — G0500 MOD SEDAT ENDO SERVICE >5YRS: HCPCS | Performed by: INTERNAL MEDICINE

## 2019-01-18 LAB
CYTO UR: NORMAL
LAB AP CASE REPORT: NORMAL
LAB AP CLINICAL INFORMATION: NORMAL
PATH REPORT.FINAL DX SPEC: NORMAL
PATH REPORT.GROSS SPEC: NORMAL

## 2019-02-11 RX ORDER — METOCLOPRAMIDE 10 MG/1
TABLET ORAL
Qty: 120 TABLET | Refills: 1 | OUTPATIENT
Start: 2019-02-11

## 2019-02-11 RX ORDER — VORTIOXETINE 10 MG/1
TABLET, FILM COATED ORAL
Qty: 30 TABLET | Refills: 5 | Status: SHIPPED | OUTPATIENT
Start: 2019-02-11 | End: 2019-08-16

## 2019-02-13 PROBLEM — J30.9 ALLERGIC RHINITIS: Status: ACTIVE | Noted: 2019-02-13

## 2019-02-13 PROBLEM — D50.9 IRON DEFICIENCY ANEMIA: Status: ACTIVE | Noted: 2019-02-13

## 2019-02-13 PROBLEM — R55 VASOVAGAL SYNCOPE: Status: ACTIVE | Noted: 2019-02-13

## 2019-02-13 PROBLEM — E78.00 HYPERCHOLESTEROLEMIA: Status: ACTIVE | Noted: 2019-02-13

## 2019-02-13 PROBLEM — R05.9 COUGH: Status: ACTIVE | Noted: 2019-02-13

## 2019-02-13 RX ORDER — METOCLOPRAMIDE 10 MG/1
TABLET ORAL
Qty: 120 TABLET | Refills: 1 | Status: SHIPPED | OUTPATIENT
Start: 2019-02-13 | End: 2019-06-08 | Stop reason: SDUPTHER

## 2019-02-15 ENCOUNTER — OFFICE VISIT (OUTPATIENT)
Dept: INTERNAL MEDICINE | Facility: CLINIC | Age: 59
End: 2019-02-15

## 2019-02-15 VITALS
HEART RATE: 88 BPM | HEIGHT: 71 IN | DIASTOLIC BLOOD PRESSURE: 70 MMHG | SYSTOLIC BLOOD PRESSURE: 124 MMHG | BODY MASS INDEX: 34.58 KG/M2 | WEIGHT: 247 LBS

## 2019-02-15 DIAGNOSIS — I10 ESSENTIAL HYPERTENSION: ICD-10-CM

## 2019-02-15 DIAGNOSIS — R55 COUGH SYNCOPE: Primary | ICD-10-CM

## 2019-02-15 DIAGNOSIS — R05.4 COUGH SYNCOPE: Primary | ICD-10-CM

## 2019-02-15 PROCEDURE — 99213 OFFICE O/P EST LOW 20 MIN: CPT | Performed by: INTERNAL MEDICINE

## 2019-02-15 NOTE — PROGRESS NOTES
[facility]     Bala Staley III  1960   [id number]      Patient Care Team:  Wilfred Kim MD as PCP - General (Internal Medicine)    Chief Complaint::   Chief Complaint   Patient presents with   • Hypertension   • Hyperlipidemia   • Heartburn   • Anemia        HPI  Mr Staley comes in for follow up of his hypertension and cough syncope.  EGD was apparently negative.  He continues to have a daytime cough, which at times makes him lightheaded, and has made him pass out.      Chronic Conditions:      Patient Active Problem List   Diagnosis   • Essential hypertension   • Mixed hyperlipidemia   • Class 2 obesity due to excess calories without serious comorbidity in adult   • Reactive depression   • Gastroesophageal reflux disease without esophagitis   • Vitamin D deficiency   • Chronic low back pain   • Dyspnea on exertion   • Syncope, tussive   • Hypokalemia   • Cough syncope   • Allergic rhinitis   • Cough   • Hypercholesterolemia   • Iron deficiency anemia   • Vasovagal syncope        Past Medical History:   Diagnosis Date   • Anemia     iron deficiency   • Chronic low back pain 3/14/2018   • Colonic polyp    • ED (erectile dysfunction)    • Esophagitis    • Essential hypertension 3/14/2018   • Gastroesophageal reflux disease without esophagitis 3/14/2018   • Hernia of abdominal cavity    • Hyperlipidemia    • Reactive depression 3/14/2018   • Tennis elbow    • Vitamin D deficiency 3/14/2018       Past Surgical History:   Procedure Laterality Date   • APPENDECTOMY     • CARDIAC CATHETERIZATION  2011   • ELBOW ARTHROPLASTY      Right    • ENDOSCOPY  2011   • ENDOSCOPY  2008    with biopsy   • FOOT SURGERY      left foot        Family History   Problem Relation Age of Onset   • Alzheimer's disease Mother    • Heart disease Father    • Heart attack Father    • Colon cancer Father    • Coronary artery disease Father    • No Known Problems Sister    • Cancer Paternal Grandmother    • Lung cancer  Paternal Grandmother        Social History     Socioeconomic History   • Marital status:      Spouse name: Not on file   • Number of children: Not on file   • Years of education: Not on file   • Highest education level: Not on file   Social Needs   • Financial resource strain: Not on file   • Food insecurity - worry: Not on file   • Food insecurity - inability: Not on file   • Transportation needs - medical: Not on file   • Transportation needs - non-medical: Not on file   Occupational History   • Not on file   Tobacco Use   • Smoking status: Never Smoker   • Smokeless tobacco: Never Used   Substance and Sexual Activity   • Alcohol use: Yes     Alcohol/week: 1.2 oz     Types: 2 Shots of liquor per week     Comment: per day, [place]    • Drug use: No   • Sexual activity: Yes     Partners: Female   Other Topics Concern   • Not on file   Social History Narrative   • Not on file       Allergies   Allergen Reactions   • Codeine Unknown (See Comments)     Not specified   • Sulfa Antibiotics Unknown (See Comments)     Not specified         Current Outpatient Medications:   •  aspirin 81 MG EC tablet, Take 81 mg by mouth Daily., Disp: , Rfl:   •  atorvastatin (LIPITOR) 40 MG tablet, Daily., Disp: , Rfl: 0  •  calcium polycarbophil (FIBERCON) 625 MG tablet, Take 625 mg by mouth 4 (Four) Times a Day., Disp: , Rfl:   •  cetirizine (zyrTEC) 10 MG tablet, Take 10 mg by mouth Daily., Disp: , Rfl:   •  Esomeprazole Magnesium (NEXIUM PO), Take  by mouth., Disp: , Rfl:   •  metoclopramide (REGLAN) 10 MG tablet, TAKE 1 TABLET BY MOUTH AT MEAL TIME AND TAKE 1 TABLET BY MOUTH AT BEDTIME, Disp: 120 tablet, Rfl: 1  •  Naphazoline-Pheniramine (OPCON-A) 0.027-0.315 % solution, Apply 1 drop to eye(s) as directed by provider Daily., Disp: , Rfl:   •  olmesartan (BENICAR) 20 MG tablet, Take 20 mg by mouth Daily., Disp: , Rfl:   •  RA VITAMIN B-12 TR 1000 MCG tablet controlled-release, Daily., Disp: , Rfl: 0  •  TRINTELLIX 10 MG tablet,  "take 1 tablet by mouth AT THE SAME TIME EACH DAY, Disp: 30 tablet, Rfl: 5  •  vitamin D (ERGOCALCIFEROL) 82118 units capsule capsule, 1 (One) Time Per Week., Disp: , Rfl: 0    Review of Systems   Constitutional: Negative.  Negative for chills, fatigue and fever.   HENT: Negative for congestion, ear pain and sinus pressure.    Respiratory: Positive for cough and wheezing. Negative for chest tightness and shortness of breath.    Cardiovascular: Negative.  Negative for chest pain and palpitations.   Gastrointestinal: Negative for abdominal pain, blood in stool, constipation and diarrhea.   Skin: Negative for color change.   Allergic/Immunologic: Negative for environmental allergies.   Neurological: Positive for dizziness. Negative for speech difficulty and headache.   Psychiatric/Behavioral: Negative for decreased concentration. The patient is not nervous/anxious.         Vital Signs  Vitals:    02/15/19 1547   BP: 124/70   BP Location: Left arm   Patient Position: Sitting   Cuff Size: Adult   Pulse: 88   Weight: 112 kg (247 lb)   Height: 180.3 cm (70.98\")       Physical Exam   Constitutional: He is oriented to person, place, and time. He appears well-developed and well-nourished.   HENT:   Head: Normocephalic and atraumatic.   Cardiovascular: Normal rate, regular rhythm and normal heart sounds.   No murmur heard.  Pulmonary/Chest: Effort normal and breath sounds normal.   Neurological: He is alert and oriented to person, place, and time.   Psychiatric: He has a normal mood and affect.   Vitals reviewed.     Procedures      Assessment/Plan:    1)  Cough syncope, negative imaging, overnight hospitalization.  Refer to pulmonary.   2)  HTN controlled.     Plan of care reviewed with patient at the conclusion of today's visit. Education was provided regarding diagnosis, management, and any prescribed or recommended OTC medications.Patient verbalizes understanding of and agreement with management plan.         Wilfred FLORES" MD Julio

## 2019-03-21 ENCOUNTER — OFFICE VISIT (OUTPATIENT)
Dept: PULMONOLOGY | Facility: CLINIC | Age: 59
End: 2019-03-21

## 2019-03-21 VITALS
SYSTOLIC BLOOD PRESSURE: 120 MMHG | HEIGHT: 71 IN | WEIGHT: 249.6 LBS | OXYGEN SATURATION: 94 % | TEMPERATURE: 98.2 F | DIASTOLIC BLOOD PRESSURE: 76 MMHG | HEART RATE: 103 BPM | BODY MASS INDEX: 34.94 KG/M2

## 2019-03-21 DIAGNOSIS — K21.9 GASTROESOPHAGEAL REFLUX DISEASE WITHOUT ESOPHAGITIS: ICD-10-CM

## 2019-03-21 DIAGNOSIS — R05.4 COUGH SYNCOPE: ICD-10-CM

## 2019-03-21 DIAGNOSIS — R05.9 COUGH: Primary | ICD-10-CM

## 2019-03-21 DIAGNOSIS — R55 COUGH SYNCOPE: ICD-10-CM

## 2019-03-21 DIAGNOSIS — K46.9 HERNIA OF ABDOMINAL CAVITY: ICD-10-CM

## 2019-03-21 PROCEDURE — 99204 OFFICE O/P NEW MOD 45 MIN: CPT | Performed by: NURSE PRACTITIONER

## 2019-03-21 NOTE — PROGRESS NOTES
[facility] Pulmonary Evaluation    CHIEF COMPLAINT    Cough    Refered by:  Wilfred Kim, *        HISTORY OF PRESENT ILLNESS    Bala Staley III is a 58 y.o.male here today for worsening cough.  He does have a chronic cough that is been persistent over the years.  He actually saw us around 18 years ago for cough and underwent bronchoscopy and evaluation was found to have significant reflux causing the cough.  He continues to follow with gastroenterology, Dr. Harry.  He last had an EGD in November 2018.    He is on a PPI, Reglan twice daily, and sleeps in a wedge pillow.  He still has several episodes of reflux and even regurgitation.    Since around November 2018 he has had some worsening in his chronic cough to where he is actually become syncopal on several episodes.  He was admitted to the hospital in November secondary to the recurrent syncope, his echo and carotid ultrasounds were negative.  His CT scan chest did show some middle lobe nodular opacities.  His Bordetella pertussis was negative, he is also up-to-date on his Tdap.    No wheezing or shortness of air.  No chest pain chest pressure palpitations.  No lower extremity edema.  He does have some seasonal environmental allergies which he takes Zyrtec, the allergies  Causes itching.  He denies any significant postnasal drainage.    Patient Active Problem List   Diagnosis   • Essential hypertension   • Mixed hyperlipidemia   • Class 2 obesity due to excess calories without serious comorbidity in adult   • Reactive depression   • Gastroesophageal reflux disease without esophagitis   • Vitamin D deficiency   • Chronic low back pain   • Dyspnea on exertion   • Syncope, tussive   • Hypokalemia   • Cough syncope   • Allergic rhinitis   • Cough   • Hypercholesterolemia   • Iron deficiency anemia   • Vasovagal syncope   • Hernia of abdominal cavity       Allergies   Allergen Reactions   • Codeine Unknown (See Comments)     Not specified   • Sulfa Antibiotics  Unknown (See Comments)     Not specified       Current Outpatient Medications:   •  aspirin 81 MG EC tablet, Take 81 mg by mouth Daily., Disp: , Rfl:   •  atorvastatin (LIPITOR) 40 MG tablet, Daily., Disp: , Rfl: 0  •  calcium polycarbophil (FIBERCON) 625 MG tablet, Take 625 mg by mouth 4 (Four) Times a Day., Disp: , Rfl:   •  cetirizine (zyrTEC) 10 MG tablet, Take 10 mg by mouth Daily., Disp: , Rfl:   •  Esomeprazole Magnesium (NEXIUM PO), Take  by mouth., Disp: , Rfl:   •  metoclopramide (REGLAN) 10 MG tablet, TAKE 1 TABLET BY MOUTH AT MEAL TIME AND TAKE 1 TABLET BY MOUTH AT BEDTIME, Disp: 120 tablet, Rfl: 1  •  olmesartan (BENICAR) 20 MG tablet, Take 20 mg by mouth Daily., Disp: , Rfl:   •  RA VITAMIN B-12 TR 1000 MCG tablet controlled-release, Daily., Disp: , Rfl: 0  •  TRINTELLIX 10 MG tablet, take 1 tablet by mouth AT THE SAME TIME EACH DAY, Disp: 30 tablet, Rfl: 5  •  vitamin D (ERGOCALCIFEROL) 47214 units capsule capsule, 1 (One) Time Per Week., Disp: , Rfl: 0    MEDICATION LIST AND ALLERGIES REVIEWED.    Social History     Tobacco Use   • Smoking status: Never Smoker   • Smokeless tobacco: Never Used   Substance Use Topics   • Alcohol use: Yes     Alcohol/week: 1.2 oz     Types: 2 Shots of liquor per week     Comment: per day, [place]    • Drug use: No       FAMILY AND SOCIAL HISTORY REVIEWED AND UPDATED IN EPIC    Review of Systems   Constitutional: Negative for chills, fatigue, fever and unexpected weight change.   HENT: Negative for congestion, nosebleeds, postnasal drip, rhinorrhea, sinus pressure and trouble swallowing.    Eyes: Positive for itching (seasonal allergies).   Respiratory: Positive for cough. Negative for chest tightness, shortness of breath and wheezing.    Cardiovascular: Negative for chest pain and leg swelling.   Gastrointestinal: Negative for abdominal pain, constipation, diarrhea, nausea and vomiting.   Genitourinary: Negative for dysuria, frequency, hematuria and urgency.  "  Musculoskeletal: Negative for myalgias.   Allergic/Immunologic: Positive for environmental allergies.   Neurological: Positive for dizziness, syncope and light-headedness. Negative for weakness, numbness and headaches.   All other systems reviewed and are negative.  .    /76   Pulse 103   Temp 98.2 °F (36.8 °C)   Ht 179.1 cm (70.5\")   Wt 113 kg (249 lb 9.6 oz)   SpO2 94% Comment: resting, room air  BMI 35.31 kg/m²   Immunization History   Administered Date(s) Administered   • Flu Vaccine Intradermal Quad 18-64YR 10/25/2018   • Flu Vaccine Quad PF >18YRS 11/25/2015   • Flu Vaccine Quad PF >36MO 10/04/2017   • Influenza TIV (IM) 09/08/2009, 10/31/2014   • Influenza, Unspecified 10/25/2018   • Tdap 03/03/2011, 07/17/2014   • Zostavax 11/25/2015       Physical Exam   Constitutional: He is oriented to person, place, and time. He appears well-developed and well-nourished.   HENT:   Head: Normocephalic and atraumatic.   Eyes: EOM are normal. Pupils are equal, round, and reactive to light.   Neck: Normal range of motion. Neck supple.   Cardiovascular: Normal rate and regular rhythm.   No murmur heard.  Pulmonary/Chest: Effort normal and breath sounds normal. No respiratory distress. He has no wheezes. He has no rales.   Abdominal: Soft. Bowel sounds are normal. He exhibits no distension.   Musculoskeletal: Normal range of motion. He exhibits no edema.   Neurological: He is alert and oriented to person, place, and time.   Skin: Skin is warm and dry. No erythema.   Psychiatric: He has a normal mood and affect. His behavior is normal.   Vitals reviewed.      RESULTS    Lab Results   Component Value Date    WBC 4.54 11/12/2018    HGB 12.0 (L) 11/12/2018    HCT 37.9 (L) 11/12/2018    MCV 79.8 (L) 11/12/2018     11/12/2018     Lab Results   Component Value Date    GLUCOSE 97 11/14/2018    CALCIUM 9.1 11/14/2018     11/14/2018    K 3.5 11/14/2018    CO2 29.0 11/14/2018    CL 99 11/14/2018    BUN 11 " 11/14/2018    CREATININE 1.08 11/14/2018    EGFRIFNONA 70 11/14/2018    BCR 10.2 11/14/2018    ANIONGAP 6.0 11/14/2018       PFTs done in the office today, and read by me:  Unable to perform secondary to vasovagal and syncope with cough    PA/LAT CXR done in the office today, and reviewed by me:      PROBLEM LIST    Problem List Items Addressed This Visit        Cardiovascular and Mediastinum    Cough syncope       Respiratory    Cough - Primary    Relevant Orders    XR Chest PA & Lateral    CT Chest Hi Resolution       Digestive    Gastroesophageal reflux disease without esophagitis       Other    Hernia of abdominal cavity            DISCUSSION      We will continue to follow up on this chronic cough.  Most likely related to his chronic reflux disease.  He needs continued management and control of his reflux.  We again went over reflux precautions and today in the office, continue follow-up with gastroenterology.  He could be intermittently aspirating causing this recurrence of the worsening cough.    His workup was negative for his syncope, likely related to vasovagal response from his coughing.  At this time we does need to duke some cough suppression.  He has tried Tessalon Perles in the past without benefit, I will provide him some Tussionex to take at night.  I will try an inhaled steroid such as Asmanex to see if we get that airway inflammation to decrease in the cough to subside.  I a short amount use in the office today, and gave him written instructions.  Alternately some Tylenol No. 3 or codeine may be of benefit for cough suppression throughout the day.    I would like to follow-up on high-resolution CT scan to rule out any interstitial changes or bronchiectasis.  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    Follow-up in 2-3 weeks on the above testing.    Anna Andino, APRN  03/21/20193:42 PM  Electronically signed     Please note that portions of this  note were completed with a voice recognition program. Efforts were made to edit the dictations, but occasionally words are mistranscribed.      CC: Wilfred Kim MD

## 2019-03-28 ENCOUNTER — HOSPITAL ENCOUNTER (OUTPATIENT)
Dept: CT IMAGING | Facility: HOSPITAL | Age: 59
Discharge: HOME | End: 2019-03-28
Admitting: NURSE PRACTITIONER

## 2019-03-28 PROCEDURE — 71250 CT THORAX DX C-: CPT

## 2019-04-02 ENCOUNTER — TELEPHONE (OUTPATIENT)
Dept: INTERNAL MEDICINE | Facility: CLINIC | Age: 59
End: 2019-04-02

## 2019-04-04 ENCOUNTER — OFFICE VISIT (OUTPATIENT)
Dept: PULMONOLOGY | Facility: CLINIC | Age: 59
End: 2019-04-04

## 2019-04-04 VITALS
WEIGHT: 244 LBS | DIASTOLIC BLOOD PRESSURE: 70 MMHG | TEMPERATURE: 97.7 F | HEIGHT: 71 IN | OXYGEN SATURATION: 94 % | SYSTOLIC BLOOD PRESSURE: 120 MMHG | HEART RATE: 95 BPM | BODY MASS INDEX: 34.16 KG/M2

## 2019-04-04 DIAGNOSIS — R05.4 COUGH SYNCOPE: ICD-10-CM

## 2019-04-04 DIAGNOSIS — R55 COUGH SYNCOPE: ICD-10-CM

## 2019-04-04 DIAGNOSIS — R05.9 COUGH: Primary | ICD-10-CM

## 2019-04-04 DIAGNOSIS — J30.9 ALLERGIC RHINITIS, UNSPECIFIED SEASONALITY, UNSPECIFIED TRIGGER: ICD-10-CM

## 2019-04-04 DIAGNOSIS — K21.9 GASTROESOPHAGEAL REFLUX DISEASE WITHOUT ESOPHAGITIS: ICD-10-CM

## 2019-04-04 PROCEDURE — 99213 OFFICE O/P EST LOW 20 MIN: CPT | Performed by: NURSE PRACTITIONER

## 2019-04-04 NOTE — PROGRESS NOTES
[facility], [place], operated by [facility] Follow up    CHIEF COMPLAINT    Cough      Subjective   HISTORY OF PRESENT ILLNESS    Bala Staley III is a 58 y.o.male here today for follow-up after his initial evaluation for a cough on March 21.  He had this cough for some time.  He had a bronchoscopy years ago that did show significant reflux causing his cough.  His last EGD with Dr. Harry was in November 2018.  Currently he is on PPI, Reglan twice daily, and sleeps on a wedge pillow.    Even since I saw him last he had another episode of severe reflux and aspiration.  He awoken in the middle of the night just a few nights ago with what felt like a golf ball inside his chest a few minutes later he vomited a large amount of foamy bile looking material.    His cough has caused several episodes of syncope as well.  In November his mid the hospital for her recurrent syncope with a normal ultrasound.    His Bordetella pertussis was negative and he is up-to-date on his Tdap.    He does have chronic allergy symptoms and takes Zyrtec.        Patient Active Problem List   Diagnosis   • Essential hypertension   • Mixed hyperlipidemia   • Class 2 obesity due to excess calories without serious comorbidity in adult   • Reactive depression   • Gastroesophageal reflux disease without esophagitis   • Vitamin D deficiency   • Chronic low back pain   • Dyspnea on exertion   • Syncope, tussive   • Hypokalemia   • Cough syncope   • Allergic rhinitis   • Cough   • Hypercholesterolemia   • Iron deficiency anemia   • Vasovagal syncope   • Hernia of abdominal cavity       Allergies   Allergen Reactions   • Codeine Unknown (See Comments)     Pt states not allergic   • Sulfa Antibiotics Unknown (See Comments)     Pt unaware of this allergy       Current Outpatient Medications:   •  aspirin 81 MG EC tablet, Take 81 mg by mouth Daily., Disp: , Rfl:   •  atorvastatin (LIPITOR) 40 MG tablet, Daily., Disp: , Rfl: 0  •  calcium polycarbophil (FIBERCON) 625 MG tablet, Take  "625 mg by mouth 4 (Four) Times a Day., Disp: , Rfl:   •  cetirizine (zyrTEC) 10 MG tablet, Take 10 mg by mouth Daily., Disp: , Rfl:   •  Esomeprazole Magnesium (NEXIUM PO), Take  by mouth., Disp: , Rfl:   •  metoclopramide (REGLAN) 10 MG tablet, TAKE 1 TABLET BY MOUTH AT MEAL TIME AND TAKE 1 TABLET BY MOUTH AT BEDTIME, Disp: 120 tablet, Rfl: 1  •  olmesartan (BENICAR) 20 MG tablet, Take 20 mg by mouth Daily., Disp: , Rfl:   •  RA VITAMIN B-12 TR 1000 MCG tablet controlled-release, Daily., Disp: , Rfl: 0  •  TRINTELLIX 10 MG tablet, take 1 tablet by mouth AT THE SAME TIME EACH DAY, Disp: 30 tablet, Rfl: 5  •  vitamin D (ERGOCALCIFEROL) 64712 units capsule capsule, 1 (One) Time Per Week., Disp: , Rfl: 0  MEDICATION LIST AND ALLERGIES REVIEWED.    Social History     Tobacco Use   • Smoking status: Never Smoker   • Smokeless tobacco: Never Used   Substance Use Topics   • Alcohol use: Yes     Alcohol/week: 1.2 oz     Types: 2 Shots of liquor per week     Comment: per day, [place]    • Drug use: No       FAMILY AND SOCIAL HISTORY REVIEWED.    Review of Systems   Constitutional: Negative for chills, fatigue, fever and unexpected weight change.   HENT: Positive for voice change. Negative for congestion, nosebleeds, postnasal drip, rhinorrhea, sinus pressure and trouble swallowing.    Respiratory: Positive for cough. Negative for chest tightness, shortness of breath and wheezing.    Cardiovascular: Negative for chest pain and leg swelling.   Gastrointestinal: Negative for abdominal pain, constipation, diarrhea, nausea and vomiting.   Genitourinary: Negative for dysuria, frequency, hematuria and urgency.   Musculoskeletal: Negative for myalgias.   Neurological: Positive for dizziness. Negative for weakness, numbness and headaches.   All other systems reviewed and are negative.  .  Objective   /70   Pulse 95   Temp 97.7 °F (36.5 °C)   Ht 179.1 cm (70.5\")   Wt 111 kg (244 lb)   SpO2 94% Comment: resting, room air  " BMI 34.52 kg/m²     Immunization History   Administered Date(s) Administered   • Flu Vaccine Intradermal Quad 18-64YR 10/25/2018   • Flu Vaccine Quad PF >18YRS 11/25/2015   • Flu Vaccine Quad PF >36MO 10/04/2017   • Influenza TIV (IM) 09/08/2009, 10/31/2014   • Influenza, Unspecified 10/25/2018   • Tdap 03/03/2011, 07/17/2014   • Zostavax 11/25/2015       Physical Exam   Constitutional: He is oriented to person, place, and time. He appears well-developed and well-nourished.   HENT:   Head: Normocephalic and atraumatic.   Eyes: EOM are normal. Pupils are equal, round, and reactive to light.   Neck: Normal range of motion. Neck supple.   Cardiovascular: Normal rate and regular rhythm.   No murmur heard.  Pulmonary/Chest: Effort normal and breath sounds normal. No respiratory distress. He has no wheezes. He has no rales.   Abdominal: Soft. Bowel sounds are normal. He exhibits no distension.   Musculoskeletal: Normal range of motion. He exhibits no edema.   Neurological: He is alert and oriented to person, place, and time.   Skin: Skin is warm and dry. No erythema.   Psychiatric: He has a normal mood and affect. His behavior is normal.   Vitals reviewed.        RESULTS    EXAMINATION: CT CHEST, HIGH RESOLUTION-03/28/2019:      INDICATION: Cough, reflux; R05-Cough.      TECHNIQUE: CT chest without intervenous contrast following  high-resolution protocol with supine and prone imaging positioning  sequences performed.     The radiation dose reduction device was turned on for each scan per the  ALARA (As Low as Reasonably Achievable) protocol.     COMPARISON: CT dated 11/13/2018.     FINDINGS: The thyroid is homogeneous in attenuation. No bulky  mediastinal adenopathy. Central airways are patent. Esophagus in normal  course and caliber. Nonaneurysmal thoracic aorta. Cardiac size within  normal limits and without pericardial effusion.     Extended lung windows demonstrate mild central bronchiectasis and  minimal midlung  scarring and atelectasis, right greater than left,  without dominant nodule or mass lesion. No focal groundglass  consolidation. No intralobular septal thickening or bronchovascular  bundle abnormality of thickening or irregularity. No subpleural  reticulation or heterogeneity. No pleural effusion. Degenerative changes  of the spine without aggressive osseous lesion.     The visualized portions of the upper abdomen are grossly unremarkable  with splenule noted at the splenic hilum.     IMPRESSION:  1.  Mild central bronchiectasis of likely postinfectious or  postinflammatory etiology with minimal scarring in the mid lungs,  however, no evidence for interstitial lung process or active  consolidation. No dominant nodule or mass lesion.  2. No significant hiatal hernia is present.     D:  03/29/2019  E:  03/29/2019     This report was finalized on 3/29/2019 1:29 PM by Dr. Bhaskar Berrios.         Assessment/Plan     PROBLEM LIST    Problem List Items Addressed This Visit        Cardiovascular and Mediastinum    Cough syncope       Respiratory    Allergic rhinitis    Cough - Primary       Digestive    Gastroesophageal reflux disease without esophagitis            DISCUSSION    At her last visit we did discuss that likely the cause of his recurrent cough and these episodes are due to recurrent aspiration episodes.  He has had another significant event in the middle of the night.  I have encouraged him to follow-up with Dr. Haryr as soon as possible, we will try to go ahead and make that follow-up appointment for him.  He is following reflux precautions, he is taking his PPI, and he is on Reglan.    During his last visit we also started on some cough suppression efforts to help with airway irritation and inflammation.  The Asmanex is helping quite a bit, he use the Tessalon Perles for a while and the Tussionex at night.  He has been off cough suppressive therapy for about a week.    We did review his high-resolution CT scan,  it does show evidence of chronic inflammatory response such as the recurrent aspiration episodes.  His infectious workup has been negative.    Follow back up here in the office for his cough after seen by GI.      I spent 15 minutes with the patient and family. I spent > 50% percent of this time counseling and discussing diagnostic testing, evaluation, current status and management.    Anna Andino, APRN  04/04/201911:34 AM  Electronically signed     Please note that portions of this note were completed with a voice recognition program. Efforts were made to edit the dictations, but occasionally words are mistranscribed.      CC: Wilfred Kim MD

## 2019-04-15 RX ORDER — PSYLLIUM HUSK 3.4 G/7G
POWDER ORAL
Qty: 90 EACH | Refills: 1 | Status: SHIPPED | OUTPATIENT
Start: 2019-04-15 | End: 2019-10-10 | Stop reason: SDUPTHER

## 2019-04-15 RX ORDER — CHLORTHALIDONE 25 MG/1
TABLET ORAL
Qty: 90 TABLET | Refills: 3 | Status: SHIPPED | OUTPATIENT
Start: 2019-04-15 | End: 2020-04-17

## 2019-04-23 ENCOUNTER — TELEPHONE (OUTPATIENT)
Dept: PULMONOLOGY | Facility: CLINIC | Age: 59
End: 2019-04-23

## 2019-04-23 NOTE — TELEPHONE ENCOUNTER
Spoke with pt and states that Rx Asmanex is still working very well and requesting a prescription be sent to [facility]. Please advise strength.

## 2019-04-23 NOTE — TELEPHONE ENCOUNTER
Pt has finished the inhaler Asmanex HFA today. Does he need to continue or not. If pt needs to continue inhaler please order at [facility] on [address]. Call pt at [phone]

## 2019-04-23 NOTE — TELEPHONE ENCOUNTER
Ordered Rx Asmanex 110 mcg per chart via fax. Pt notified and verbalized understanding and appreciation.

## 2019-05-02 RX ORDER — OLMESARTAN MEDOXOMIL 20 MG/1
TABLET ORAL
Qty: 30 TABLET | Refills: 5 | Status: SHIPPED | OUTPATIENT
Start: 2019-05-02 | End: 2019-08-16

## 2019-05-21 ENCOUNTER — OFFICE VISIT (OUTPATIENT)
Dept: GASTROENTEROLOGY | Facility: CLINIC | Age: 59
End: 2019-05-21

## 2019-05-21 VITALS
HEIGHT: 71 IN | DIASTOLIC BLOOD PRESSURE: 78 MMHG | OXYGEN SATURATION: 99 % | WEIGHT: 246.6 LBS | HEART RATE: 96 BPM | BODY MASS INDEX: 34.52 KG/M2 | TEMPERATURE: 97.8 F | SYSTOLIC BLOOD PRESSURE: 120 MMHG

## 2019-05-21 DIAGNOSIS — R05.3 CHRONIC COUGH: ICD-10-CM

## 2019-05-21 DIAGNOSIS — K21.9 GASTROESOPHAGEAL REFLUX DISEASE WITHOUT ESOPHAGITIS: Primary | ICD-10-CM

## 2019-05-21 PROCEDURE — 99214 OFFICE O/P EST MOD 30 MIN: CPT | Performed by: INTERNAL MEDICINE

## 2019-05-21 RX ORDER — FLUTICASONE PROPIONATE 50 MCG
2 SPRAY, SUSPENSION (ML) NASAL DAILY
COMMUNITY
End: 2020-09-18

## 2019-05-21 NOTE — PROGRESS NOTES
Chief Complaint: Bala is here today because of a chronic cough and reflux.      HPI Bala has had reflux for years.  He has been compliant in using his PPI appropriately.  He sleeps on a wedge.  According to him he had a pH study done by pulmonary that showed severe reflux.  This was 8 or 9 years ago.  Apparently a bronchoscopy showed some bronchiectasis possibly related to chronic aspiration.  He has had documented syncope from coughing.  His recent endoscopy showed reflux but did not show severe esophagitis.  Obviously you can have significant reflux and a negative endoscopy.  Because of his continued symptoms he is here today for a treatment plan  Past Medical History:   Past Medical History:   Diagnosis Date   • Anemia     iron deficiency   • Chronic low back pain 3/14/2018   • Colonic polyp    • ED (erectile dysfunction)    • Esophagitis    • Essential hypertension 3/14/2018   • Gastroesophageal reflux disease without esophagitis 3/14/2018   • Hernia of abdominal cavity    • Hyperlipidemia    • Reactive depression 3/14/2018   • Tennis elbow    • Vitamin D deficiency 3/14/2018       Family History:  Family History   Problem Relation Age of Onset   • Alzheimer's disease Mother    • Heart disease Father    • Heart attack Father    • Colon cancer Father    • Coronary artery disease Father    • No Known Problems Sister    • Cancer Paternal Grandmother    • Lung cancer Paternal Grandmother        Social History:   reports that he has never smoked. He has never used smokeless tobacco. He reports that he drinks about 1.2 oz of alcohol per week. He reports that he does not use drugs.    Medications:     Current Outpatient Medications:   •  aspirin 81 MG EC tablet, Take 81 mg by mouth Daily., Disp: , Rfl:   •  atorvastatin (LIPITOR) 40 MG tablet, Daily., Disp: , Rfl: 0  •  calcium polycarbophil (FIBERCON) 625 MG tablet, Take 625 mg by mouth 4 (Four) Times a Day., Disp: , Rfl:   •  cetirizine (zyrTEC) 10 MG tablet,  Take 10 mg by mouth Daily., Disp: , Rfl:   •  chlorthalidone (HYGROTON) 25 MG tablet, take 1 tablet by mouth once daily, Disp: 90 tablet, Rfl: 3  •  Esomeprazole Magnesium (NEXIUM PO), Take  by mouth., Disp: , Rfl:   •  fluticasone (FLONASE) 50 MCG/ACT nasal spray, 2 sprays into the nostril(s) as directed by provider Daily., Disp: , Rfl:   •  metoclopramide (REGLAN) 10 MG tablet, TAKE 1 TABLET BY MOUTH AT MEAL TIME AND TAKE 1 TABLET BY MOUTH AT BEDTIME, Disp: 120 tablet, Rfl: 1  •  mometasone (ASMANEX TWISTHALER) inhaler 110 mcg/inhalation, Inhale 2 puffs Daily., Disp: 1 each, Rfl: 6  •  olmesartan (BENICAR) 20 MG tablet, take 1 tablet by mouth once daily, Disp: 30 tablet, Rfl: 5  •  RA VITAMIN B-12 TR 1000 MCG tablet controlled-release, take 1 tablet by mouth once daily, Disp: 90 each, Rfl: 1  •  TRINTELLIX 10 MG tablet, take 1 tablet by mouth AT THE SAME TIME EACH DAY, Disp: 30 tablet, Rfl: 5  •  vitamin D (ERGOCALCIFEROL) 82522 units capsule capsule, 1 (One) Time Per Week., Disp: , Rfl: 0    Allergies:  Sulfa antibiotics    Review of Systems   Constitutional: Negative for activity change, appetite change, fatigue, fever and unexpected weight change.   HENT: Positive for trouble swallowing. Negative for hearing loss and voice change.    Eyes: Negative for visual disturbance.   Respiratory: Positive for cough. Negative for choking, chest tightness and shortness of breath.    Cardiovascular: Negative for chest pain.   Gastrointestinal: Positive for nausea and vomiting. Negative for abdominal distention, abdominal pain, anal bleeding, blood in stool, constipation, diarrhea and rectal pain.        Heartburn   Endocrine: Negative for polydipsia and polyphagia.   Genitourinary: Negative.    Musculoskeletal: Negative for gait problem and joint swelling.   Skin: Negative for color change and rash.   Allergic/Immunologic: Negative for food allergies.   Neurological: Positive for dizziness and syncope. Negative for seizures  and speech difficulty.   Hematological: Negative for adenopathy.   Psychiatric/Behavioral: Negative for confusion.             Physical Exam:   Constitutional: Pt is oriented to person, place, and time and well-developed, well-nourished, and in no distress.   HENT: Mouth/Throat: Oropharynx is clear and moist.   Neck: Normal range of motion. Neck supple.   Cardiovascular: Normal rate, regular rhythm and normal heart sounds.    Pulmonary/Chest: Effort normal and breath sounds normal. No respiratory distress. No  wheezes.   Abdominal: Soft. Bowel sounds are normal. He has a rectus diastases.may have a small umbilical hernia.  He is nontender  Skin: Skin is warm and dry.   Psychiatric: Mood, memory, affect and judgment normal.           Assessment and Plan Bala continues to have chronic reflux symptoms.  I do not know if they contribute or are the cause of his cough.  I think what I have discussed with him at length is referring him to Dr. Saulo Lowery.  I think a repeat pH study and consideration for fundoplication pending this and further work-up needs to be entertained.  Is agreeable to seeing Dr. Lowery and will discuss his course after his work-up is completed        ICD-10-CM ICD-9-CM   1. Gastroesophageal reflux disease without esophagitis K21.9 530.81   2. Chronic cough R05 786.2     Dr. Piero Garza/Transcription disclaimer:   Much of this encounter note is an electronic transcription/translation of spoken language to printed text. The electronic translation of spoken language may permit erroneous, or at times, nonsensical words or phrases to be inadvertently transcribed; Although I have reviewed the note for such errors, some may still exist.

## 2019-06-03 RX ORDER — ERGOCALCIFEROL 1.25 MG/1
CAPSULE ORAL
Qty: 4 CAPSULE | Refills: 5 | Status: SHIPPED | OUTPATIENT
Start: 2019-06-03 | End: 2019-11-19 | Stop reason: SDUPTHER

## 2019-06-10 ENCOUNTER — TRANSCRIBE ORDERS (OUTPATIENT)
Dept: ADMINISTRATIVE | Facility: HOSPITAL | Age: 59
End: 2019-06-10

## 2019-06-10 DIAGNOSIS — K44.9 DIAPHRAGMATIC HERNIA WITHOUT OBSTRUCTION AND WITHOUT GANGRENE: Primary | ICD-10-CM

## 2019-06-10 RX ORDER — METOCLOPRAMIDE 10 MG/1
TABLET ORAL
Qty: 120 TABLET | Refills: 1 | Status: SHIPPED | OUTPATIENT
Start: 2019-06-10 | End: 2019-11-04 | Stop reason: SDUPTHER

## 2019-06-27 ENCOUNTER — HOSPITAL ENCOUNTER (OUTPATIENT)
Facility: HOSPITAL | Age: 59
Setting detail: HOSPITAL OUTPATIENT SURGERY
Discharge: HOME | End: 2019-06-27
Attending: SURGERY | Admitting: SURGERY

## 2019-06-27 PROCEDURE — 91010 ESOPHAGUS MOTILITY STUDY: CPT | Performed by: SURGERY

## 2019-07-15 ENCOUNTER — HOSPITAL ENCOUNTER (OUTPATIENT)
Dept: GENERAL RADIOLOGY | Facility: HOSPITAL | Age: 59
Discharge: HOME | End: 2019-07-15
Admitting: SURGERY

## 2019-07-15 ENCOUNTER — HOSPITAL ENCOUNTER (OUTPATIENT)
Dept: NUCLEAR MEDICINE | Facility: HOSPITAL | Age: 59
Discharge: HOME | End: 2019-07-15

## 2019-07-15 DIAGNOSIS — K44.9 DIAPHRAGMATIC HERNIA WITHOUT OBSTRUCTION AND WITHOUT GANGRENE: ICD-10-CM

## 2019-07-15 PROCEDURE — A9541 TC99M SULFUR COLLOID: HCPCS | Performed by: SURGERY

## 2019-07-15 PROCEDURE — 74220 X-RAY XM ESOPHAGUS 1CNTRST: CPT

## 2019-07-15 PROCEDURE — 78264 GASTRIC EMPTYING IMG STUDY: CPT

## 2019-07-15 PROCEDURE — 0 TECHNETIUM SULFUR COLLOID: Performed by: SURGERY

## 2019-07-15 RX ADMIN — BARIUM SULFATE 183 ML: 960 POWDER, FOR SUSPENSION ORAL at 10:45

## 2019-07-15 RX ADMIN — TECHNETIUM TC 99M SULFUR COLLOID 1 DOSE: KIT at 08:40

## 2019-07-29 ENCOUNTER — OFFICE VISIT (OUTPATIENT)
Dept: INTERNAL MEDICINE | Facility: CLINIC | Age: 59
End: 2019-07-29

## 2019-07-29 ENCOUNTER — TELEPHONE (OUTPATIENT)
Dept: INTERNAL MEDICINE | Facility: CLINIC | Age: 59
End: 2019-07-29

## 2019-07-29 VITALS
WEIGHT: 246 LBS | BODY MASS INDEX: 34.44 KG/M2 | HEART RATE: 88 BPM | SYSTOLIC BLOOD PRESSURE: 112 MMHG | HEIGHT: 71 IN | DIASTOLIC BLOOD PRESSURE: 64 MMHG

## 2019-07-29 DIAGNOSIS — I10 ESSENTIAL HYPERTENSION: ICD-10-CM

## 2019-07-29 DIAGNOSIS — R55 VASOVAGAL SYNCOPE: Primary | ICD-10-CM

## 2019-07-29 DIAGNOSIS — F32.9 REACTIVE DEPRESSION: ICD-10-CM

## 2019-07-29 DIAGNOSIS — K21.9 GASTROESOPHAGEAL REFLUX DISEASE WITHOUT ESOPHAGITIS: ICD-10-CM

## 2019-07-29 PROCEDURE — 99214 OFFICE O/P EST MOD 30 MIN: CPT | Performed by: INTERNAL MEDICINE

## 2019-07-29 NOTE — PROGRESS NOTES
[facility]     Bala Staley III  1960   [id number]      Patient Care Team:  Wilfred Kim MD as PCP - General (Internal Medicine)    Chief Complaint::   Chief Complaint   Patient presents with   • Loss of Consciousness   • Nausea   • Dizziness   • Cough        HPI  Mr. Staley experienced yet another episode of lightheadedness, this time while mowing his lawn in the heat, without cough.  Previously his episodes had been attributed to cough syncope.  He has had a significant cardiac work-up, pulmonary work-up which pointed toward reflux esophagitis, and GI work-up.  He was then referred to general surgery for consideration of surgical correction of hiatal hernia, but Dr. Lowery felt that it was small and surgery was not indicated.  This was after an esophagram and normal gastric emptying scan.  He feels foggy.  He has less energy and less motivation to do anything.    Chronic Conditions:      Patient Active Problem List   Diagnosis   • Essential hypertension   • Mixed hyperlipidemia   • Class 2 obesity due to excess calories without serious comorbidity in adult   • Reactive depression   • Gastroesophageal reflux disease without esophagitis   • Vitamin D deficiency   • Chronic low back pain   • Dyspnea on exertion   • Syncope, tussive   • Hypokalemia   • Cough syncope   • Allergic rhinitis   • Cough   • Hypercholesterolemia   • Iron deficiency anemia   • Vasovagal syncope   • Hernia of abdominal cavity        Past Medical History:   Diagnosis Date   • Anemia     iron deficiency   • Chronic low back pain 3/14/2018   • Colonic polyp    • ED (erectile dysfunction)    • Esophagitis    • Essential hypertension 3/14/2018   • Gastroesophageal reflux disease without esophagitis 3/14/2018   • Hernia of abdominal cavity    • Hyperlipidemia    • Reactive depression 3/14/2018   • Tennis elbow    • Vitamin D deficiency 3/14/2018       Past Surgical History:   Procedure Laterality Date   • APPENDECTOMY      • CARDIAC CATHETERIZATION  2011   • ELBOW ARTHROPLASTY      Right    • ENDOSCOPY  2011   • ENDOSCOPY  2008    with biopsy   • ESOPHAGEAL MOTILITY STUDY N/A 6/27/2019    Procedure: MANOMETRY;  Surgeon: Mark Lowery MD;  Location: [facility] ENDOSCOPY;  Service: Gastroenterology   • FOOT SURGERY      left foot        Family History   Problem Relation Age of Onset   • Alzheimer's disease Mother    • Heart disease Father    • Heart attack Father    • Colon cancer Father    • Coronary artery disease Father    • No Known Problems Sister    • Cancer Paternal Grandmother    • Lung cancer Paternal Grandmother        Social History     Socioeconomic History   • Marital status:      Spouse name: Not on file   • Number of children: Not on file   • Years of education: Not on file   • Highest education level: Not on file   Tobacco Use   • Smoking status: Never Smoker   • Smokeless tobacco: Never Used   Substance and Sexual Activity   • Alcohol use: Yes     Alcohol/week: 1.2 oz     Types: 2 Shots of liquor per week     Comment: per day, [place]    • Drug use: No   • Sexual activity: Yes     Partners: Female       Allergies   Allergen Reactions   • Sulfa Antibiotics Unknown (See Comments)     Pt unaware of this allergy         Current Outpatient Medications:   •  aspirin 81 MG EC tablet, Take 81 mg by mouth Daily., Disp: , Rfl:   •  atorvastatin (LIPITOR) 40 MG tablet, Daily., Disp: , Rfl: 0  •  calcium polycarbophil (FIBERCON) 625 MG tablet, Take 625 mg by mouth 4 (Four) Times a Day., Disp: , Rfl:   •  cetirizine (zyrTEC) 10 MG tablet, Take 10 mg by mouth Daily., Disp: , Rfl:   •  chlorthalidone (HYGROTON) 25 MG tablet, take 1 tablet by mouth once daily, Disp: 90 tablet, Rfl: 3  •  Esomeprazole Magnesium (NEXIUM PO), Take  by mouth., Disp: , Rfl:   •  fluticasone (FLONASE) 50 MCG/ACT nasal spray, 2 sprays into the nostril(s) as directed by provider Daily., Disp: , Rfl:   •  metoclopramide (REGLAN) 10 MG tablet, take 1  "tablet by mouth AT MEAL TIME AND 1 TABLET BY MOUTH AT BEDTIME, Disp: 120 tablet, Rfl: 1  •  olmesartan (BENICAR) 20 MG tablet, take 1 tablet by mouth once daily, Disp: 30 tablet, Rfl: 5  •  RA VITAMIN B-12 TR 1000 MCG tablet controlled-release, take 1 tablet by mouth once daily, Disp: 90 each, Rfl: 1  •  TRINTELLIX 10 MG tablet, take 1 tablet by mouth AT THE SAME TIME EACH DAY, Disp: 30 tablet, Rfl: 5  •  vitamin D (ERGOCALCIFEROL) 17965 units capsule capsule, take 1 capsule by mouth every week, Disp: 4 capsule, Rfl: 5    Review of Systems   Constitutional: Positive for fatigue. Negative for chills and fever.   HENT: Negative for congestion, ear pain and sinus pressure.    Respiratory: Positive for cough and shortness of breath. Negative for chest tightness and wheezing.    Cardiovascular: Negative for chest pain and palpitations.   Gastrointestinal: Positive for nausea. Negative for abdominal pain, blood in stool and constipation.   Skin: Negative for color change.   Allergic/Immunologic: Negative for environmental allergies.   Neurological: Positive for dizziness and weakness. Negative for speech difficulty and headache.   Psychiatric/Behavioral: Negative for decreased concentration. The patient is not nervous/anxious.         Vital Signs  Vitals:    07/29/19 1627   BP: 112/64   BP Location: Left arm   Patient Position: Sitting   Cuff Size: Adult   Pulse: 88   Weight: 112 kg (246 lb)   Height: 179.1 cm (70.51\")   PainSc: 0-No pain       Physical Exam   Constitutional: He is oriented to person, place, and time. He appears well-developed and well-nourished.   HENT:   Head: Normocephalic and atraumatic.   Cardiovascular: Normal rate, regular rhythm and normal heart sounds.   No murmur heard.  Pulmonary/Chest: Effort normal and breath sounds normal.   Neurological: He is alert and oriented to person, place, and time.   Psychiatric: He has a normal mood and affect.   Vitals reviewed.     Procedures    ACE III MINI    "          Assessment/Plan:    Bala was seen today for loss of consciousness, nausea, dizziness and cough.    Diagnoses and all orders for this visit:    Vasovagal syncope    Essential hypertension    Gastroesophageal reflux disease without esophagitis    Reactive depression    Plan    He is instructed to stop olmesartan and continue chlorthalidone and monitor blood pressure daily.  He has felt better with less edema since he has been on chlorthalidone but now his blood pressure is a bit low.    He will reduce metoclopramide from 3 times a day to twice a day.  I am concerned that metoclopramide side effects are part of the reason for his not feeling well in his mental fogginess.    I am also concerned that Trintellix could be a cause of his mental fogginess.  He will reduce that to every other day, and before we consider adding Lexapro, I would like to see how he does with reduce medication burden overall.      Plan of care reviewed with patient at the conclusion of today's visit. Education was provided regarding diagnosis, management, and any prescribed or recommended OTC medications.Patient verbalizes understanding of and agreement with management plan.         Wilfred Kim MD

## 2019-07-29 NOTE — PATIENT INSTRUCTIONS
Stop olmesartan, continue chlorthalidone, monitor blood pressure daily.     Reduce trintellix to every other day.     Reduce metoclopramide to twice a day.

## 2019-08-02 ENCOUNTER — TELEPHONE (OUTPATIENT)
Dept: INTERNAL MEDICINE | Facility: CLINIC | Age: 59
End: 2019-08-02

## 2019-08-02 NOTE — TELEPHONE ENCOUNTER
No need to respond to this, but these things have been checked many times by us and by specialists

## 2019-08-02 NOTE — TELEPHONE ENCOUNTER
Patient's daughter called (Terri He [phone]) who lives 8 hours away was calling in information that she feels the doctor needs to know about her dad.    I explained to the daughter that she is not on the patient release of information and she was aware of that so I did not give her any patient information.    She stated she has been looking on facebook and seeing how there are a lot of people who may have what her father has such as syncope.  She says her father is unable to stand without passing out at times.  On his next office visit she wants his blood pressure and heart rate checked standing as well as sitting.  Also do labs to check his salt levels, etc. She says her father doesn't do anything but lay around the house.

## 2019-08-16 ENCOUNTER — OFFICE VISIT (OUTPATIENT)
Dept: INTERNAL MEDICINE | Facility: CLINIC | Age: 59
End: 2019-08-16

## 2019-08-16 VITALS
BODY MASS INDEX: 34.02 KG/M2 | HEIGHT: 71 IN | WEIGHT: 243 LBS | HEART RATE: 76 BPM | DIASTOLIC BLOOD PRESSURE: 80 MMHG | SYSTOLIC BLOOD PRESSURE: 145 MMHG

## 2019-08-16 DIAGNOSIS — I10 ESSENTIAL HYPERTENSION: ICD-10-CM

## 2019-08-16 DIAGNOSIS — E55.9 VITAMIN D DEFICIENCY: ICD-10-CM

## 2019-08-16 DIAGNOSIS — Z00.00 PREVENTATIVE HEALTH CARE: Primary | ICD-10-CM

## 2019-08-16 DIAGNOSIS — R55 COUGH SYNCOPE: ICD-10-CM

## 2019-08-16 DIAGNOSIS — R53.83 OTHER FATIGUE: ICD-10-CM

## 2019-08-16 DIAGNOSIS — Z12.5 PROSTATE CANCER SCREENING: ICD-10-CM

## 2019-08-16 DIAGNOSIS — R05.4 COUGH SYNCOPE: ICD-10-CM

## 2019-08-16 DIAGNOSIS — E78.2 MIXED HYPERLIPIDEMIA: ICD-10-CM

## 2019-08-16 DIAGNOSIS — F32.9 REACTIVE DEPRESSION: ICD-10-CM

## 2019-08-16 DIAGNOSIS — M25.512 ACUTE PAIN OF LEFT SHOULDER: ICD-10-CM

## 2019-08-16 DIAGNOSIS — R73.9 HYPERGLYCEMIA: ICD-10-CM

## 2019-08-16 DIAGNOSIS — E66.09 CLASS 1 OBESITY DUE TO EXCESS CALORIES WITH SERIOUS COMORBIDITY AND BODY MASS INDEX (BMI) OF 34.0 TO 34.9 IN ADULT: ICD-10-CM

## 2019-08-16 DIAGNOSIS — K21.9 GASTROESOPHAGEAL REFLUX DISEASE WITHOUT ESOPHAGITIS: ICD-10-CM

## 2019-08-16 PROCEDURE — 99396 PREV VISIT EST AGE 40-64: CPT | Performed by: INTERNAL MEDICINE

## 2019-08-16 RX ORDER — AZELASTINE 1 MG/ML
2 SPRAY, METERED NASAL 2 TIMES DAILY
Refills: 0 | COMMUNITY
Start: 2019-07-01 | End: 2020-09-18

## 2019-08-16 NOTE — PROGRESS NOTES
"[facility]     Bala Staley III  1960   [id number]      Patient Care Team:  Wilfred Kim MD as PCP - General (Internal Medicine)    Chief Complaint::   Chief Complaint   Patient presents with   • Annual Exam   • Hyperlipidemia   • Hypertension        HPI  Mr. Staley is now 59.  He comes in for follow-up of his hypertension, hyperlipidemia, cough syncope and for annual examination.  For over a year now we have been looking for a solution to prevent his syncope.  He has had a full cardiac work-up.  He spent a night in the hospital and was th then evaluated by pulmonary, including bronchoscopy.  Pulmonary feels that he has aspiration which causes the cough and then syncope.  He saw his gastroenterologist Dr. Harry who referred him on to general surgery to consider surgical repair of his hiatal hernia.  However after evaluation with manometry and appropriate tests, Dr. Lowery felt that his moderately sized hiatal hernia was not severe enough to operate on.  2 weeks ago he came in after experiencing a near syncopal episode when he was mowing the grass in the heat, this time without a cough.  He also complained of having less energy and less motivation.  Last weekend his son complained that he was not even 60 years old and he was already acting like an old man, because when they go to the pool he sits under the umbrella.  At that visit we discontinued his olmesartan and continue chlorthalidone.  Because he felt \"foggy,\" we also asked him to reduce his Trintellix to every other day.  With these changes he feels better.  He is somewhat less foggy mentally and less lethargic.  Interestingly he has had a significant increase in lower extremity edema since discontinuing olmesartan.  Also he has pain in the left shoulder.  Approximately 4 weeks ago he was holding the door open for someone when his foot slipped and he hyperextended the left shoulder.  Is been very sore and tender with use since " then.    Chronic Conditions:      Patient Active Problem List   Diagnosis   • Essential hypertension   • Mixed hyperlipidemia   • Class 2 obesity due to excess calories without serious comorbidity in adult   • Reactive depression   • Gastroesophageal reflux disease without esophagitis   • Vitamin D deficiency   • Chronic low back pain   • Dyspnea on exertion   • Syncope, tussive   • Hypokalemia   • Cough syncope   • Allergic rhinitis   • Cough   • Hypercholesterolemia   • Iron deficiency anemia   • Vasovagal syncope   • Hernia of abdominal cavity        Past Medical History:   Diagnosis Date   • Anemia     iron deficiency   • Chronic low back pain 3/14/2018   • Colonic polyp    • ED (erectile dysfunction)    • Esophagitis    • Essential hypertension 3/14/2018   • Gastroesophageal reflux disease without esophagitis 3/14/2018   • Hernia of abdominal cavity    • Hyperlipidemia    • Reactive depression 3/14/2018   • Tennis elbow    • Vitamin D deficiency 3/14/2018       Past Surgical History:   Procedure Laterality Date   • APPENDECTOMY     • CARDIAC CATHETERIZATION  2011   • ELBOW ARTHROPLASTY      Right    • ENDOSCOPY  2011   • ENDOSCOPY  2008    with biopsy   • ESOPHAGEAL MOTILITY STUDY N/A 6/27/2019    Procedure: MANOMETRY;  Surgeon: Mark Lowery MD;  Location: [facility] ENDOSCOPY;  Service: Gastroenterology   • FOOT SURGERY      left foot        Family History   Problem Relation Age of Onset   • Alzheimer's disease Mother    • Heart disease Father    • Heart attack Father    • Colon cancer Father    • Coronary artery disease Father    • No Known Problems Sister    • Cancer Paternal Grandmother    • Lung cancer Paternal Grandmother        Social History     Socioeconomic History   • Marital status:      Spouse name: Not on file   • Number of children: Not on file   • Years of education: Not on file   • Highest education level: Not on file   Tobacco Use   • Smoking status: Never Smoker   • Smokeless  tobacco: Never Used   Substance and Sexual Activity   • Alcohol use: Yes     Alcohol/week: 1.2 oz     Types: 2 Shots of liquor per week     Comment: per day, [place]    • Drug use: No   • Sexual activity: Yes     Partners: Female       Allergies   Allergen Reactions   • Sulfa Antibiotics Unknown (See Comments)     Pt unaware of this allergy         Current Outpatient Medications:   •  aspirin 81 MG EC tablet, Take 81 mg by mouth Daily., Disp: , Rfl:   •  atorvastatin (LIPITOR) 40 MG tablet, Daily., Disp: , Rfl: 0  •  azelastine (ASTELIN) 0.1 % nasal spray, 2 sprays 2 (Two) Times a Day., Disp: , Rfl: 0  •  calcium polycarbophil (FIBERCON) 625 MG tablet, Take 625 mg by mouth 4 (Four) Times a Day., Disp: , Rfl:   •  cetirizine (zyrTEC) 10 MG tablet, Take 10 mg by mouth Daily., Disp: , Rfl:   •  chlorthalidone (HYGROTON) 25 MG tablet, take 1 tablet by mouth once daily, Disp: 90 tablet, Rfl: 3  •  Esomeprazole Magnesium (NEXIUM PO), Take  by mouth., Disp: , Rfl:   •  fluticasone (FLONASE) 50 MCG/ACT nasal spray, 2 sprays into the nostril(s) as directed by provider Daily., Disp: , Rfl:   •  metoclopramide (REGLAN) 10 MG tablet, take 1 tablet by mouth AT MEAL TIME AND 1 TABLET BY MOUTH AT BEDTIME, Disp: 120 tablet, Rfl: 1  •  RA VITAMIN B-12 TR 1000 MCG tablet controlled-release, take 1 tablet by mouth once daily, Disp: 90 each, Rfl: 1  •  vitamin D (ERGOCALCIFEROL) 38434 units capsule capsule, take 1 capsule by mouth every week, Disp: 4 capsule, Rfl: 5    Immunization History   Administered Date(s) Administered   • Flu Vaccine Intradermal Quad 18-64YR 10/25/2018   • Flu Vaccine Quad PF >18YRS 11/25/2015   • Flu Vaccine Quad PF >36MO 10/04/2017   • Influenza TIV (IM) 09/08/2009, 10/31/2014   • Influenza, Unspecified 10/25/2018   • Tdap 03/03/2011, 07/17/2014   • Zostavax 11/25/2015        Health Maintenance Due   Topic Date Due   • ANNUAL PHYSICAL  05/17/1963   • ZOSTER VACCINE (2 of 3) 01/20/2016   • HEPATITIS C SCREENING  " 03/14/2018   • LIPID PANEL  03/14/2018   • COLONOSCOPY  03/14/2018   • INFLUENZA VACCINE  08/01/2019        Review of Systems   Constitutional: Positive for fatigue. Negative for chills and fever.   HENT: Negative for congestion, ear pain and sinus pressure.    Respiratory: Positive for cough and shortness of breath. Negative for chest tightness and wheezing.    Cardiovascular: Negative for chest pain and palpitations.   Gastrointestinal: Negative for abdominal pain, blood in stool and constipation.   Skin: Negative for color change.   Allergic/Immunologic: Negative for environmental allergies.   Neurological: Positive for dizziness. Negative for speech difficulty and headache.   Psychiatric/Behavioral: Negative for decreased concentration. The patient is not nervous/anxious.         Vital Signs  Vitals:    08/16/19 1048 08/16/19 1142   BP: 144/72 145/80   BP Location: Left arm    Patient Position: Sitting Lying   Cuff Size: Adult    Pulse: 84 76   Weight: 110 kg (243 lb)    Height: 179.1 cm (70.51\")    PainSc:   6    PainLoc: Shoulder  Comment: wrist        Physical Exam   Constitutional: He is oriented to person, place, and time. He appears well-developed and well-nourished. He is obese.  HENT:   Head: Normocephalic and atraumatic.   Right Ear: External ear normal.   Left Ear: External ear normal.   Nose: Nose normal.   Mouth/Throat: Oropharynx is clear and moist. No oropharyngeal exudate.   Eyes: Conjunctivae and EOM are normal. Pupils are equal, round, and reactive to light.   Neck: Normal range of motion. Neck supple. No JVD present. No thyromegaly present.   Cardiovascular: Normal rate, regular rhythm, normal heart sounds and intact distal pulses. Exam reveals no gallop and no friction rub.   No murmur heard.  Pulmonary/Chest: Effort normal and breath sounds normal. No respiratory distress. He has no wheezes. He has no rales. He exhibits no tenderness.   Abdominal: Soft. Bowel sounds are normal. He exhibits " no distension and no mass. There is no tenderness. There is no rebound and no guarding. No hernia.   Musculoskeletal: Normal range of motion. He exhibits no tenderness.   With the left upper extremity held horizontally at the shoulder level he has decreased strength.  He has 1+ pretibial edema in both lower extremities.   Lymphadenopathy:     He has no cervical adenopathy.   Neurological: He is alert and oriented to person, place, and time. He displays normal reflexes. No cranial nerve deficit or sensory deficit. He exhibits normal muscle tone. Coordination normal.   Skin: Skin is warm and dry. No rash noted. No erythema.   Psychiatric: He has a normal mood and affect. His behavior is normal. Judgment and thought content normal.   Nursing note and vitals reviewed.     Procedures     Fall Risk Screen:  STEADI Fall Risk Assessment has not been completed.    Health Habits and Functional and Cognitive Screening:  No flowsheet data found.    Depression [place]  PHQ-9 Total Score: 0     ACE III MINI             Assessment/Plan:    Bala was seen today for annual exam, hyperlipidemia and hypertension.    Diagnoses and all orders for this visit:    Preventative health care    Acute pain of left shoulder  -     Ambulatory Referral to Physical Therapy Evaluate and treat    Essential hypertension    Mixed hyperlipidemia    Cough syncope    Reactive depression    Class 1 obesity due to excess calories with serious comorbidity and body mass index (BMI) of 34.0 to 34.9 in adult    Gastroesophageal reflux disease without esophagitis    Plan    He has multiple issues which we are still trying to work through.  Colonoscopy was performed 8/18 2011, due at 10 years.    On exam he has a rotator cuff or labral injury in the left shoulder.  He is referred to physical therapy.    Blood pressure control is up today and he is not orthostatic.  This is not surprising as he is now on only chlorthalidone, although it is surprising that he  has more edema off olmesartan.  For now we will continue current regimen.  He also complains of nocturia, suggested he reduce fluid intake in the evening.    Lipid panel is pending, continue efforts at healthy diet and exercise.    His syncope remains a dilemma.  The strategy of discontinuing olmesartan was to see if he had less postural hypotension.  He does seem to feel better.  There may be some component of autonomic insufficiency.  He still may need to see neurology.    He feels better mentally on reduced Trintellix.  He will discontinue completely.  We will reassess in 2 weeks.    BMI is 34.  Encouraged him to try to improve his diet to lose weight.    He will continue Nexium for his reflux.  Unfortunately pulmonary and general surgery did not agree about more aggressive treatment.      Labs  Results for orders placed or performed in visit on 01/17/19   Tissue Pathology Exam   Result Value Ref Range    Case Report       Surgical Pathology Report                         Case: [id number]                                  Authorizing Provider:  Case Harry MD        Collected:           01/17/2019 10:41 AM          Pathologist:           Randy Anand MD         Received:            01/17/2019 10:41 AM          Specimens:   1) - Gastric, Antrum                                                                                2) - Gastric, Fundus                                                                       Clinical Information       The working history is eosinophilic esophagitis, GERD, and diaphragmatic hernia.       Final Diagnosis        1. GASTRIC ANTRUM BIOPSIES:  Reactive gastropathy; negative for H. pylori on routine stain.   2. GASTRIC FUNDUS BIOPSIES:  Benign fundic gland polyps.    JFJ/malka       Gross Description       Specimen 1 received in formalin labeled as antrum biopsy are two tan soft tissue fragments aggregating 0.4 x 0.4 x 0.3 cm. Submitted in toto in cassette 1.      Specimen 2  received in formalin labeled as fundus biopsy are two tan soft tissue fragments aggregating 0.4 x 0.4 x 0.2 cm. Submitted in toto in cassette 2.  HBM/klb       Microscopic Description       Sections from specimen 1 show gastric pit hyperplasia with pits which are elongated and somewhat tortuous.  There is no significant active inflammation or intestinal metaplasia.  The underlying lamina propria shows an increase in chronic inflammatory cells without nodular aggregates as well as increase in connective tissue elements including small slips of smooth muscle which extend to the surface epithelium.  Careful searching through the gastric pits reveals no organisms morphologically compatible with Helicobacter on routine stain.     Sections from specimen 2 show several polypoid fragments with dilated underlying specialized glands.  There is no adenomatous change.              Counseling was given to patient for the following topics: appropriate exercise 150 minutes/week, disease prevention and healthy eating habits.    Plan of care reviewed with patient at the conclusion of today's visit. Education was provided regarding diagnosis, management, and any prescribed or recommended OTC medications.Patient verbalizes understanding of and agreement with management plan.         Wilfred Kim MD

## 2019-08-29 ENCOUNTER — LAB (OUTPATIENT)
Dept: LAB | Facility: HOSPITAL | Age: 59
End: 2019-08-29

## 2019-08-29 DIAGNOSIS — Z12.5 PROSTATE CANCER SCREENING: ICD-10-CM

## 2019-08-29 DIAGNOSIS — I10 ESSENTIAL HYPERTENSION: ICD-10-CM

## 2019-08-29 DIAGNOSIS — E78.2 MIXED HYPERLIPIDEMIA: ICD-10-CM

## 2019-08-29 DIAGNOSIS — R53.83 OTHER FATIGUE: ICD-10-CM

## 2019-08-29 DIAGNOSIS — E55.9 VITAMIN D DEFICIENCY: ICD-10-CM

## 2019-08-29 DIAGNOSIS — R73.9 HYPERGLYCEMIA: ICD-10-CM

## 2019-08-29 LAB
25(OH)D3 SERPL-MCNC: 41 NG/ML (ref 30–100)
ALBUMIN SERPL-MCNC: 4.9 G/DL (ref 3.5–5.2)
ALBUMIN UR-MCNC: 17.6 MG/DL
ALBUMIN/GLOB SERPL: 1.9 G/DL
ALP SERPL-CCNC: 89 U/L (ref 39–117)
ALT SERPL W P-5'-P-CCNC: 32 U/L (ref 1–41)
ANION GAP SERPL CALCULATED.3IONS-SCNC: 14.4 MMOL/L (ref 5–15)
AST SERPL-CCNC: 34 U/L (ref 1–40)
BASOPHILS # BLD AUTO: 0.03 10*3/MM3 (ref 0–0.2)
BASOPHILS NFR BLD AUTO: 1 % (ref 0–1.5)
BILIRUB SERPL-MCNC: 0.6 MG/DL (ref 0.2–1.2)
BUN BLD-MCNC: 9 MG/DL (ref 6–20)
BUN/CREAT SERPL: 8 (ref 7–25)
CALCIUM SPEC-SCNC: 9.9 MG/DL (ref 8.6–10.5)
CHLORIDE SERPL-SCNC: 94 MMOL/L (ref 98–107)
CHOLEST SERPL-MCNC: 132 MG/DL (ref 0–200)
CO2 SERPL-SCNC: 28.6 MMOL/L (ref 22–29)
CREAT BLD-MCNC: 1.12 MG/DL (ref 0.76–1.27)
CREAT UR-MCNC: 463.5 MG/DL
DEPRECATED RDW RBC AUTO: 45.1 FL (ref 37–54)
EOSINOPHIL # BLD AUTO: 0.1 10*3/MM3 (ref 0–0.4)
EOSINOPHIL NFR BLD AUTO: 3.3 % (ref 0.3–6.2)
ERYTHROCYTE [DISTWIDTH] IN BLOOD BY AUTOMATED COUNT: 16.7 % (ref 12.3–15.4)
GFR SERPL CREATININE-BSD FRML MDRD: 67 ML/MIN/1.73
GLOBULIN UR ELPH-MCNC: 2.6 GM/DL
GLUCOSE BLD-MCNC: 95 MG/DL (ref 65–99)
HBA1C MFR BLD: 5.3 % (ref 4.8–5.6)
HCT VFR BLD AUTO: 30.6 % (ref 37.5–51)
HDLC SERPL-MCNC: 61 MG/DL (ref 40–60)
HGB BLD-MCNC: 8.3 G/DL (ref 13–17.7)
IMM GRANULOCYTES # BLD AUTO: 0.01 10*3/MM3 (ref 0–0.05)
IMM GRANULOCYTES NFR BLD AUTO: 0.3 % (ref 0–0.5)
LDLC SERPL CALC-MCNC: 55 MG/DL (ref 0–100)
LDLC/HDLC SERPL: 0.91 {RATIO}
LYMPHOCYTES # BLD AUTO: 0.52 10*3/MM3 (ref 0.7–3.1)
LYMPHOCYTES NFR BLD AUTO: 17.4 % (ref 19.6–45.3)
MCH RBC QN AUTO: 20.5 PG (ref 26.6–33)
MCHC RBC AUTO-ENTMCNC: 27.1 G/DL (ref 31.5–35.7)
MCV RBC AUTO: 75.6 FL (ref 79–97)
MICROALBUMIN/CREAT UR: 38 MG/G
MONOCYTES # BLD AUTO: 0.49 10*3/MM3 (ref 0.1–0.9)
MONOCYTES NFR BLD AUTO: 16.4 % (ref 5–12)
NEUTROPHILS # BLD AUTO: 1.84 10*3/MM3 (ref 1.7–7)
NEUTROPHILS NFR BLD AUTO: 61.6 % (ref 42.7–76)
NRBC BLD AUTO-RTO: 0.3 /100 WBC (ref 0–0.2)
PLATELET # BLD AUTO: 173 10*3/MM3 (ref 140–450)
PMV BLD AUTO: 11.4 FL (ref 6–12)
POTASSIUM BLD-SCNC: 3 MMOL/L (ref 3.5–5.2)
PROT SERPL-MCNC: 7.5 G/DL (ref 6–8.5)
PSA SERPL-MCNC: 1.63 NG/ML (ref 0–4)
RBC # BLD AUTO: 4.05 10*6/MM3 (ref 4.14–5.8)
SODIUM BLD-SCNC: 137 MMOL/L (ref 136–145)
TRIGL SERPL-MCNC: 78 MG/DL (ref 0–150)
TSH SERPL DL<=0.05 MIU/L-ACNC: 2.25 UIU/ML (ref 0.27–4.2)
VIT B12 BLD-MCNC: 913 PG/ML (ref 211–946)
VLDLC SERPL-MCNC: 15.6 MG/DL (ref 5–40)
WBC NRBC COR # BLD: 2.99 10*3/MM3 (ref 3.4–10.8)

## 2019-08-29 PROCEDURE — G0103 PSA SCREENING: HCPCS

## 2019-08-29 PROCEDURE — 82607 VITAMIN B-12: CPT

## 2019-08-29 PROCEDURE — 83036 HEMOGLOBIN GLYCOSYLATED A1C: CPT

## 2019-08-29 PROCEDURE — 80061 LIPID PANEL: CPT

## 2019-08-29 PROCEDURE — 82306 VITAMIN D 25 HYDROXY: CPT

## 2019-08-29 PROCEDURE — 84443 ASSAY THYROID STIM HORMONE: CPT

## 2019-08-29 PROCEDURE — 85025 COMPLETE CBC W/AUTO DIFF WBC: CPT

## 2019-08-29 PROCEDURE — 82570 ASSAY OF URINE CREATININE: CPT

## 2019-08-29 PROCEDURE — 80053 COMPREHEN METABOLIC PANEL: CPT

## 2019-08-29 PROCEDURE — 82043 UR ALBUMIN QUANTITATIVE: CPT

## 2019-09-05 ENCOUNTER — LAB (OUTPATIENT)
Dept: LAB | Facility: HOSPITAL | Age: 59
End: 2019-09-05

## 2019-09-05 ENCOUNTER — OFFICE VISIT (OUTPATIENT)
Dept: INTERNAL MEDICINE | Facility: CLINIC | Age: 59
End: 2019-09-05

## 2019-09-05 VITALS
BODY MASS INDEX: 33.74 KG/M2 | DIASTOLIC BLOOD PRESSURE: 72 MMHG | HEART RATE: 96 BPM | HEIGHT: 71 IN | SYSTOLIC BLOOD PRESSURE: 132 MMHG | WEIGHT: 241 LBS

## 2019-09-05 DIAGNOSIS — D50.0 IRON DEFICIENCY ANEMIA DUE TO CHRONIC BLOOD LOSS: Primary | ICD-10-CM

## 2019-09-05 DIAGNOSIS — D50.0 IRON DEFICIENCY ANEMIA DUE TO CHRONIC BLOOD LOSS: ICD-10-CM

## 2019-09-05 DIAGNOSIS — R55 COUGH SYNCOPE: ICD-10-CM

## 2019-09-05 DIAGNOSIS — R63.4 WEIGHT LOSS: ICD-10-CM

## 2019-09-05 DIAGNOSIS — R05.4 COUGH SYNCOPE: ICD-10-CM

## 2019-09-05 DIAGNOSIS — I10 ESSENTIAL HYPERTENSION: ICD-10-CM

## 2019-09-05 DIAGNOSIS — E87.6 HYPOKALEMIA: ICD-10-CM

## 2019-09-05 LAB
ANISOCYTOSIS BLD QL: ABNORMAL
DACRYOCYTES BLD QL SMEAR: ABNORMAL
DEPRECATED RDW RBC AUTO: 45.4 FL (ref 37–54)
ELLIPTOCYTES BLD QL SMEAR: ABNORMAL
EOSINOPHIL # BLD MANUAL: 0.07 10*3/MM3 (ref 0–0.4)
EOSINOPHIL NFR BLD MANUAL: 2 % (ref 0.3–6.2)
ERYTHROCYTE [DISTWIDTH] IN BLOOD BY AUTOMATED COUNT: 17.1 % (ref 12.3–15.4)
FERRITIN SERPL-MCNC: 6.67 NG/ML (ref 30–400)
GIANT PLATELETS: ABNORMAL
HCT VFR BLD AUTO: 30.3 % (ref 37.5–51)
HGB BLD-MCNC: 8.2 G/DL (ref 13–17.7)
HYPOCHROMIA BLD QL: ABNORMAL
IRON 24H UR-MRATE: 13 MCG/DL (ref 59–158)
IRON SATN MFR SERPL: 2 % (ref 20–50)
LYMPHOCYTES # BLD MANUAL: 0.5 10*3/MM3 (ref 0.7–3.1)
LYMPHOCYTES NFR BLD MANUAL: 12 % (ref 5–12)
LYMPHOCYTES NFR BLD MANUAL: 15 % (ref 19.6–45.3)
MCH RBC QN AUTO: 20.2 PG (ref 26.6–33)
MCHC RBC AUTO-ENTMCNC: 27.1 G/DL (ref 31.5–35.7)
MCV RBC AUTO: 74.8 FL (ref 79–97)
MICROCYTES BLD QL: ABNORMAL
MONOCYTES # BLD AUTO: 0.4 10*3/MM3 (ref 0.1–0.9)
NEUTROPHILS # BLD AUTO: 2.34 10*3/MM3 (ref 1.7–7)
NEUTROPHILS NFR BLD MANUAL: 71 % (ref 42.7–76)
OVALOCYTES BLD QL SMEAR: ABNORMAL
PLATELET # BLD AUTO: 205 10*3/MM3 (ref 140–450)
PMV BLD AUTO: 11.7 FL (ref 6–12)
POIKILOCYTOSIS BLD QL SMEAR: ABNORMAL
POLYCHROMASIA BLD QL SMEAR: ABNORMAL
RBC # BLD AUTO: 4.05 10*6/MM3 (ref 4.14–5.8)
TIBC SERPL-MCNC: 522 MCG/DL (ref 298–536)
TRANSFERRIN SERPL-MCNC: 350 MG/DL (ref 200–360)
WBC MORPH BLD: NORMAL
WBC NRBC COR # BLD: 3.3 10*3/MM3 (ref 3.4–10.8)

## 2019-09-05 PROCEDURE — 84466 ASSAY OF TRANSFERRIN: CPT

## 2019-09-05 PROCEDURE — 82728 ASSAY OF FERRITIN: CPT

## 2019-09-05 PROCEDURE — 99214 OFFICE O/P EST MOD 30 MIN: CPT | Performed by: INTERNAL MEDICINE

## 2019-09-05 PROCEDURE — 90471 IMMUNIZATION ADMIN: CPT | Performed by: INTERNAL MEDICINE

## 2019-09-05 PROCEDURE — 90632 HEPA VACCINE ADULT IM: CPT | Performed by: INTERNAL MEDICINE

## 2019-09-05 PROCEDURE — 83540 ASSAY OF IRON: CPT

## 2019-09-05 PROCEDURE — 85025 COMPLETE CBC W/AUTO DIFF WBC: CPT

## 2019-09-05 PROCEDURE — 85007 BL SMEAR W/DIFF WBC COUNT: CPT

## 2019-09-05 RX ORDER — POTASSIUM CHLORIDE 20 MEQ/1
20 TABLET, EXTENDED RELEASE ORAL DAILY
Qty: 30 TABLET | Refills: 5 | Status: SHIPPED | OUTPATIENT
Start: 2019-09-05 | End: 2019-10-24 | Stop reason: SDUPTHER

## 2019-09-05 NOTE — PROGRESS NOTES
"[facility]     Bala Staley III  1960   [id number]      Patient Care Team:  Wilfred Kim MD as PCP - General (Internal Medicine)    Chief Complaint::   Chief Complaint   Patient presents with   • Cough        HPI  Mr. Staley comes in for follow-up of his cough, cough syncope, fatigue and weight loss.  He is not feeling any better.  He is now completely off his antidepressant.  He still feels \"foggy,\" and is beginning to feel a bit more emotionally labile.  He states that recently when he went to the pool with family he had to leave because he could not stay in the heat.  At last weekends [employer] football game he had to leave in the first quarter because of the heat.  He continues to have recurrent cough, nausea and vomiting.    Chronic Conditions:      Patient Active Problem List   Diagnosis   • Essential hypertension   • Mixed hyperlipidemia   • Class 2 obesity due to excess calories without serious comorbidity in adult   • Reactive depression   • Gastroesophageal reflux disease without esophagitis   • Vitamin D deficiency   • Chronic low back pain   • Dyspnea on exertion   • Syncope, tussive   • Hypokalemia   • Cough syncope   • Allergic rhinitis   • Cough   • Hypercholesterolemia   • Iron deficiency anemia   • Vasovagal syncope   • Hernia of abdominal cavity        Past Medical History:   Diagnosis Date   • Anemia     iron deficiency   • Chronic low back pain 3/14/2018   • Colonic polyp    • ED (erectile dysfunction)    • Esophagitis    • Essential hypertension 3/14/2018   • Gastroesophageal reflux disease without esophagitis 3/14/2018   • Hernia of abdominal cavity    • Hyperlipidemia    • Reactive depression 3/14/2018   • Tennis elbow    • Vitamin D deficiency 3/14/2018       Past Surgical History:   Procedure Laterality Date   • APPENDECTOMY     • CARDIAC CATHETERIZATION  2011   • ELBOW ARTHROPLASTY      Right    • ENDOSCOPY  2011   • ENDOSCOPY  2008    with biopsy   • ESOPHAGEAL " MOTILITY STUDY N/A 6/27/2019    Procedure: MANOMETRY;  Surgeon: Mark Lowery MD;  Location: [facility] END[facility]COPY;  Service: Gastroenterology   • FOOT SURGERY      left foot        Family History   Problem Relation Age of Onset   • Alzheimer's disease Mother    • Heart disease Father    • Heart attack Father    • Colon cancer Father    • Coronary artery disease Father    • No Known Problems Sister    • Cancer Paternal Grandmother    • Lung cancer Paternal Grandmother        Social History     Socioeconomic History   • Marital status:      Spouse name: Not on file   • Number of children: Not on file   • Years of education: Not on file   • Highest education level: Not on file   Tobacco Use   • Smoking status: Never Smoker   • Smokeless tobacco: Never Used   Substance and Sexual Activity   • Alcohol use: Yes     Alcohol/week: 1.2 oz     Types: 2 Shots of liquor per week     Comment: per day, [place]    • Drug use: No   • Sexual activity: Yes     Partners: Female       Allergies   Allergen Reactions   • Sulfa Antibiotics Unknown (See Comments)     Pt unaware of this allergy         Current Outpatient Medications:   •  aspirin 81 MG EC tablet, Take 81 mg by mouth Daily., Disp: , Rfl:   •  atorvastatin (LIPITOR) 40 MG tablet, Daily., Disp: , Rfl: 0  •  azelastine (ASTELIN) 0.1 % nasal spray, 2 sprays 2 (Two) Times a Day., Disp: , Rfl: 0  •  calcium polycarbophil (FIBERCON) 625 MG tablet, Take 625 mg by mouth 4 (Four) Times a Day., Disp: , Rfl:   •  cetirizine (zyrTEC) 10 MG tablet, Take 10 mg by mouth Daily., Disp: , Rfl:   •  chlorthalidone (HYGROTON) 25 MG tablet, take 1 tablet by mouth once daily, Disp: 90 tablet, Rfl: 3  •  Esomeprazole Magnesium (NEXIUM PO), Take  by mouth., Disp: , Rfl:   •  fluticasone (FLONASE) 50 MCG/ACT nasal spray, 2 sprays into the nostril(s) as directed by provider Daily., Disp: , Rfl:   •  metoclopramide (REGLAN) 10 MG tablet, take 1 tablet by mouth AT MEAL TIME AND 1 TABLET BY MOUTH  "AT BEDTIME, Disp: 120 tablet, Rfl: 1  •  RA VITAMIN B-12 TR 1000 MCG tablet controlled-release, take 1 tablet by mouth once daily, Disp: 90 each, Rfl: 1  •  vitamin D (ERGOCALCIFEROL) 72865 units capsule capsule, take 1 capsule by mouth every week, Disp: 4 capsule, Rfl: 5  •  potassium chloride (K-DUR,KLOR-CON) 20 MEQ CR tablet, Take 1 tablet by mouth Daily., Disp: 30 tablet, Rfl: 5    Review of Systems   Constitutional: Negative for chills, fatigue and fever.   HENT: Negative for congestion, ear pain and sinus pressure.    Respiratory: Positive for cough. Negative for chest tightness, shortness of breath and wheezing.    Cardiovascular: Negative for chest pain and palpitations.   Gastrointestinal: Negative for abdominal pain, blood in stool and constipation.   Skin: Negative for color change.   Allergic/Immunologic: Negative for environmental allergies.   Neurological: Positive for dizziness. Negative for speech difficulty and headache.   Psychiatric/Behavioral: Negative for decreased concentration. The patient is not nervous/anxious.         Vital Signs  Vitals:    09/05/19 1009   BP: 132/72   BP Location: Left arm   Patient Position: Sitting   Cuff Size: Adult   Pulse: 96   Weight: 109 kg (241 lb)   Height: 179.1 cm (70.51\")   PainSc:   8   PainLoc: Shoulder       Physical Exam   Constitutional: He is oriented to person, place, and time. He appears well-developed and well-nourished.   HENT:   Head: Normocephalic and atraumatic.   Cardiovascular: Normal rate, regular rhythm and normal heart sounds.   No murmur heard.  Pulmonary/Chest: Effort normal and breath sounds normal.   Neurological: He is alert and oriented to person, place, and time.   Psychiatric: He has a normal mood and affect.   Vitals reviewed.     Procedures    ACE III MINI             Assessment/Plan:    Bala was seen today for cough.    Diagnoses and all orders for this visit:    Iron deficiency anemia due to chronic blood loss  -     CBC & " Differential; Future  -     Iron Profile; Future  -     Ferritin; Future  -     Ambulatory Referral to Gastroenterology    Hypokalemia    Essential hypertension    Cough syncope    Weight loss    Other orders  -     potassium chloride (K-DUR,KLOR-CON) 20 MEQ CR tablet; Take 1 tablet by mouth Daily.  -     Hepatitis A Vaccine Adult IM    Plan    Iron deficiency anemia is due to GI blood loss until proven otherwise.  EGD has been done this year.  He is referred for colonoscopy.    He will start taking potassium daily.  Hypokalemia is almost certainly due to his thiazide diuretic.    Blood pressure is controlled on chlorthalidone alone.    Coughing cough syncope remain Beau.  All along we have felt he had increased vagal tone, but have had little recourse in dealing with it.  He does not feel well, he has little strength of stamina, and is not happy.  If today's measures do not result in improvement would consider referral clinic such as [facility].    If colonoscopy is negative and he continues to lose weight, would consider CT scans of the chest abdomen and pelvis.      Plan of care reviewed with patient at the conclusion of today's visit. Education was provided regarding diagnosis, management, and any prescribed or recommended OTC medications.Patient verbalizes understanding of and agreement with management plan.         Wilfred Kim MD

## 2019-09-08 DIAGNOSIS — R63.4 WEIGHT LOSS: ICD-10-CM

## 2019-09-08 DIAGNOSIS — D72.818 OTHER DECREASED WHITE BLOOD CELL (WBC) COUNT: ICD-10-CM

## 2019-09-08 DIAGNOSIS — D50.8 OTHER IRON DEFICIENCY ANEMIA: Primary | ICD-10-CM

## 2019-09-09 ENCOUNTER — TELEPHONE (OUTPATIENT)
Dept: INTERNAL MEDICINE | Facility: CLINIC | Age: 59
End: 2019-09-09

## 2019-09-09 NOTE — TELEPHONE ENCOUNTER
Patient was given instructions recommended by Dr. Kim regarding his CBC results and he verbalized understanding and also his referral.

## 2019-09-17 ENCOUNTER — TELEPHONE (OUTPATIENT)
Dept: INTERNAL MEDICINE | Facility: CLINIC | Age: 59
End: 2019-09-17

## 2019-09-23 DIAGNOSIS — Z12.11 SCREENING FOR COLON CANCER: Primary | ICD-10-CM

## 2019-09-24 ENCOUNTER — TELEPHONE (OUTPATIENT)
Dept: INTERNAL MEDICINE | Facility: CLINIC | Age: 59
End: 2019-09-24

## 2019-09-24 DIAGNOSIS — G89.29 CHRONIC LEFT SHOULDER PAIN: Primary | ICD-10-CM

## 2019-09-24 DIAGNOSIS — M25.512 CHRONIC LEFT SHOULDER PAIN: Primary | ICD-10-CM

## 2019-09-24 NOTE — TELEPHONE ENCOUNTER
PT CALLED REQUEST A REFERRAL FOR ORTHO . HE HAS ONE MORE VISIT WITH PHYSICAL THERAPY AND HIS SHOULDER ISN'T GETTING ANY BETTER. HERE ARE SOME DATES THAT  HE IS NOT AVAILABLE 10/14/19-10/21/19  .

## 2019-09-30 ENCOUNTER — CONSULT (OUTPATIENT)
Dept: ONCOLOGY | Facility: CLINIC | Age: 59
End: 2019-09-30

## 2019-09-30 ENCOUNTER — LAB (OUTPATIENT)
Dept: LAB | Facility: HOSPITAL | Age: 59
End: 2019-09-30

## 2019-09-30 VITALS
HEIGHT: 70 IN | SYSTOLIC BLOOD PRESSURE: 145 MMHG | TEMPERATURE: 97.8 F | BODY MASS INDEX: 34.5 KG/M2 | OXYGEN SATURATION: 98 % | DIASTOLIC BLOOD PRESSURE: 76 MMHG | WEIGHT: 241 LBS | HEART RATE: 93 BPM | RESPIRATION RATE: 18 BRPM

## 2019-09-30 DIAGNOSIS — D50.0 IRON DEFICIENCY ANEMIA DUE TO CHRONIC BLOOD LOSS: Primary | ICD-10-CM

## 2019-09-30 DIAGNOSIS — D50.0 IRON DEFICIENCY ANEMIA DUE TO CHRONIC BLOOD LOSS: ICD-10-CM

## 2019-09-30 LAB
ERYTHROCYTE [DISTWIDTH] IN BLOOD BY AUTOMATED COUNT: 19.5 % (ref 12.3–15.4)
FERRITIN SERPL-MCNC: 11.41 NG/ML (ref 30–400)
HCT VFR BLD AUTO: 28.2 % (ref 37.5–51)
HGB BLD-MCNC: 8.5 G/DL (ref 13–17.7)
IRON 24H UR-MRATE: 20 MCG/DL (ref 59–158)
IRON SATN MFR SERPL: 4 % (ref 20–50)
LYMPHOCYTES # BLD AUTO: 1.2 10*3/MM3 (ref 0.7–3.1)
LYMPHOCYTES NFR BLD AUTO: 24.5 % (ref 19.6–45.3)
MCH RBC QN AUTO: 20.6 PG (ref 26.6–33)
MCHC RBC AUTO-ENTMCNC: 30.2 G/DL (ref 31.5–35.7)
MCV RBC AUTO: 68.2 FL (ref 79–97)
MONOCYTES # BLD AUTO: 0.5 10*3/MM3 (ref 0.1–0.9)
MONOCYTES NFR BLD AUTO: 9.6 % (ref 5–12)
NEUTROPHILS # BLD AUTO: 3.3 10*3/MM3 (ref 1.7–7)
NEUTROPHILS NFR BLD AUTO: 65.9 % (ref 42.7–76)
PLATELET # BLD AUTO: 231 10*3/MM3 (ref 140–450)
PMV BLD AUTO: 7.4 FL (ref 6–12)
RBC # BLD AUTO: 4.13 10*6/MM3 (ref 4.14–5.8)
TIBC SERPL-MCNC: 514 MCG/DL (ref 298–536)
TRANSFERRIN SERPL-MCNC: 345 MG/DL (ref 200–360)
WBC NRBC COR # BLD: 5 10*3/MM3 (ref 3.4–10.8)

## 2019-09-30 PROCEDURE — 84466 ASSAY OF TRANSFERRIN: CPT

## 2019-09-30 PROCEDURE — 83540 ASSAY OF IRON: CPT

## 2019-09-30 PROCEDURE — 82728 ASSAY OF FERRITIN: CPT

## 2019-09-30 PROCEDURE — 36415 COLL VENOUS BLD VENIPUNCTURE: CPT

## 2019-09-30 PROCEDURE — 85025 COMPLETE CBC W/AUTO DIFF WBC: CPT

## 2019-09-30 PROCEDURE — 99204 OFFICE O/P NEW MOD 45 MIN: CPT | Performed by: INTERNAL MEDICINE

## 2019-09-30 RX ORDER — FERROUS SULFATE 325(65) MG
325 TABLET ORAL
Qty: 60 TABLET | Refills: 3 | Status: SHIPPED | OUTPATIENT
Start: 2019-09-30 | End: 2020-03-27

## 2019-09-30 NOTE — PROGRESS NOTES
ID: 59 y.o. year old male from [place] [id number]    PCP: Wilfred Kim MD    REFERRING PHYSICIAN: Wilfred Kim MD    Reason for Consultation: Iron deficiency anemia    Dear Dr. Kim    It is a pleasure to meet Mr. Higginbotham today.  As you know he is a very pleasant 59-year-old gentleman I am seeing in consultation for his iron deficiency anemia.  He reports that he has been on Nexium for a number of years and prior to that was on Prevacid.  He has history of eosinophilic esophagitis.  He just underwent EGD recently which was negative.  He is scheduled for a colonoscopy tomorrow.  Does have pica ongoing.  In spite of all the these issues his main complaint is his persistent cough which is been occurring for the last year.  He has had a high resolution CT of the chest which was negative.  He did have some bronchiectasis present.  But nothing significant to explain what is going on here.  He also has syncope related to his cough.    Past Medical History:   Diagnosis Date   • Anemia     iron deficiency   • Chronic low back pain 3/14/2018   • Colonic polyp    • ED (erectile dysfunction)    • Esophagitis    • Essential hypertension 3/14/2018   • Gastroesophageal reflux disease without esophagitis 3/14/2018   • Hernia of abdominal cavity    • Hyperlipidemia    • Reactive depression 3/14/2018   • Tennis elbow    • Vitamin D deficiency 3/14/2018       Past Surgical History:   Procedure Laterality Date   • APPENDECTOMY     • CARDIAC CATHETERIZATION  2011   • ELBOW ARTHROPLASTY      Right    • ENDOSCOPY  2011   • ENDOSCOPY  2008    with biopsy   • ESOPHAGEAL MOTILITY STUDY N/A 6/27/2019    Procedure: MANOMETRY;  Surgeon: Mark Lowery MD;  Location: [facility] ENDOSCOPY;  Service: Gastroenterology   • FOOT SURGERY      left foot        Social History     Socioeconomic History   • Marital status:      Spouse name: Not on file   • Number of children: Not on file   • Years of education: Not on file    • Highest education level: Not on file   Tobacco Use   • Smoking status: Never Smoker   • Smokeless tobacco: Former User     Types: Chew   Substance and Sexual Activity   • Alcohol use: Yes     Alcohol/week: 1.2 oz     Types: 2 Shots of liquor per week     Comment: per day, [place]    • Drug use: No   • Sexual activity: Yes     Partners: Female       Family History   Problem Relation Age of Onset   • Alzheimer's disease Mother    • Heart disease Father    • Heart attack Father    • Colon cancer Father    • Coronary artery disease Father    • No Known Problems Sister    • Cancer Paternal Grandmother    • Lung cancer Paternal Grandmother        Review of Systems:    16 point review of systems was performed and reviewed and scanned into the EMR    Review of Systems - Oncology      Current Outpatient Medications:   •  aspirin 81 MG EC tablet, Take 81 mg by mouth Daily., Disp: , Rfl:   •  atorvastatin (LIPITOR) 40 MG tablet, Daily., Disp: , Rfl: 0  •  azelastine (ASTELIN) 0.1 % nasal spray, 2 sprays 2 (Two) Times a Day., Disp: , Rfl: 0  •  calcium polycarbophil (FIBERCON) 625 MG tablet, Take 625 mg by mouth 4 (Four) Times a Day., Disp: , Rfl:   •  cetirizine (zyrTEC) 10 MG tablet, Take 10 mg by mouth Daily., Disp: , Rfl:   •  chlorthalidone (HYGROTON) 25 MG tablet, take 1 tablet by mouth once daily, Disp: 90 tablet, Rfl: 3  •  Esomeprazole Magnesium (NEXIUM PO), Take  by mouth., Disp: , Rfl:   •  fluticasone (FLONASE) 50 MCG/ACT nasal spray, 2 sprays into the nostril(s) as directed by provider Daily., Disp: , Rfl:   •  metoclopramide (REGLAN) 10 MG tablet, take 1 tablet by mouth AT MEAL TIME AND 1 TABLET BY MOUTH AT BEDTIME, Disp: 120 tablet, Rfl: 1  •  potassium chloride (K-DUR,KLOR-CON) 20 MEQ CR tablet, Take 1 tablet by mouth Daily., Disp: 30 tablet, Rfl: 5  •  RA VITAMIN B-12 TR 1000 MCG tablet controlled-release, take 1 tablet by mouth once daily, Disp: 90 each, Rfl: 1  •  Sod Picosulfate-Mag Ox-Cit Acd 10-3.5-12  MG-GM -GM/160ML solution, Take 1 kit by mouth Take As Directed. Follow instructions that were mailed to your home. If you didn't receive these call [phone]., Disp: 2 bottle, Rfl: 0  •  vitamin D (ERGOCALCIFEROL) 05286 units capsule capsule, take 1 capsule by mouth every week, Disp: 4 capsule, Rfl: 5  •  ferrous sulfate 325 (65 FE) MG tablet, Take 1 tablet by mouth Daily With Breakfast., Disp: 60 tablet, Rfl: 3    Allergies   Allergen Reactions   • Sulfa Antibiotics Unknown (See Comments)     Pt unaware of this allergy       ECOG SCORE: 0    Objective     Vitals:    09/30/19 1300   BP: 145/76   Pulse: 93   Resp: 18   Temp: 97.8 °F (36.6 °C)   SpO2: 98%     Body mass index is 34.58 kg/m².  Body surface area is 2.26 meters squared.        09/30/19  1300   Weight: 109 kg (241 lb)         Physical Exam    General: well appearing, in no acute distress  HEENT: sclera anicteric, oropharynx clear, neck is supple  Lymphatics: no cervical, supraclavicular, or axillary adenopathy  Cardiovascular: regular rate and rhythm, no murmurs, rubs or gallops  Lungs: clear to auscultation bilaterally  Abdomen: soft, nontender, nondistended.  No palpable organomegaly  Extremities: no lower extremity edema  Skin: no rashes, lesions, bruising, or petechiae  Msk:  Shows no weakness of the large muscle groups  Psych: Mood is stable      Lab Results   Component Value Date    GLUCOSE 95 08/29/2019    BUN 9 08/29/2019    CREATININE 1.12 08/29/2019     08/29/2019    K 3.0 (L) 08/29/2019    CL 94 (L) 08/29/2019    CO2 28.6 08/29/2019    CALCIUM 9.9 08/29/2019    PROTEINTOT 7.5 08/29/2019    ALBUMIN 4.90 08/29/2019    BILITOT 0.6 08/29/2019    ALKPHOS 89 08/29/2019    AST 34 08/29/2019    ALT 32 08/29/2019       Lab Results   Component Value Date    HGB 8.5 (L) 09/30/2019    HCT 28.2 (L) 09/30/2019    MCV 68.2 (L) 09/30/2019     09/30/2019    WBC 5.00 09/30/2019    NEUTROABS 3.30 09/30/2019    LYMPHSABS 1.20 09/30/2019     MONOSABS 0.50 09/30/2019    EOSABS 0.07 09/05/2019    BASOSABS 0.03 08/29/2019       Lab Results   Component Value Date    HGB 8.5 (L) 09/30/2019    HGB 8.2 (L) 09/05/2019    HGB 8.3 (L) 08/29/2019     Lab Results   Component Value Date    FERRITIN 11.41 (L) 09/30/2019    FERRITIN 6.67 (L) 09/05/2019     Lab Results   Component Value Date    MCV 68.2 (L) 09/30/2019    MCV 74.8 (L) 09/05/2019    MCV 75.6 (L) 08/29/2019     Lab Results   Component Value Date    TIBC 514 09/30/2019    TIBC 522 09/05/2019     Lab Results   Component Value Date    LABIRON 4 (L) 09/30/2019    LABIRON 2 (L) 09/05/2019     Lab Results   Component Value Date    IRON 20 (L) 09/30/2019    IRON 13 (L) 09/05/2019       Lab Results   Component Value Date    [id number] 913 08/29/2019     No results found for: FOLATE      Nm Gastric Emptying    Result Date: 7/15/2019  Normal gastric emptying study.   D:  07/15/2019 E:  07/15/2019    This report was finalized on 7/15/2019 3:26 PM by Dr. Melba Paulson MD.      Fl Esophagram Complete    Result Date: 7/15/2019  1. Mild esophageal dysmotility 2. Moderate gastroesophageal reflux to the level of the thoracic inlet 3. Small sized sliding-type hiatal hernia    This report was finalized on 7/15/2019 5:14 PM by Dr. Bhaskar Berrios.            ASSESSMENT:    1.  Microcytic anemia secondary to malabsorption and likely blood loss.  I suspect his anemia is related to profound iron deficiency from poor absorption of iron.  Suspect he is also losing blood in his stool likely from either hemorrhoids or diverticulosis.  He is undergoing a colonoscopy tomorrow to  make sure he does not have something ominous such as a colon cancer.  Regardless he does not need IV iron at this time and will place him on oral iron with vitamin C to help with absorption.  If he does not improve in the next month he is going to require IV iron.    2.  Persistent cough which seems idiopathic in nature.  I went over the differential with  him and his wife today.  It would include atypical infections versus autoimmune processes.  I would be concerned about him having this process for almost a year without an answer.  He may require a biopsy of the lung to evaluate this.      I spent a total of 45 minutes in direct patient care, greater than 30 minutes (greater than 50%) were spent in coordination of care, and counseling the patient regarding iron deficiency anemia and persistent cough. Answered any questions patient had with medication and plan.        Thank you for allowing me to participate in the care of this patient.    Yours sincerely,    Daisy Hogan MD  [facility]  Hematology and Oncology    Return on: 10/28/19  Return in (Approximately): 1 month    Orders Placed This Encounter   Procedures   • Ferritin     Standing Status:   Future     Number of Occurrences:   1     Standing Expiration Date:   9/30/2020   • Iron Profile     Standing Status:   Future     Number of Occurrences:   1     Standing Expiration Date:   9/30/2020   • Ferritin     Standing Status:   Future     Standing Expiration Date:   9/30/2020   • CBC & Differential     Standing Status:   Future     Number of Occurrences:   1     Standing Expiration Date:   9/30/2020     Order Specific Question:   Manual Differential     Answer:   No   • CBC & Differential     Standing Status:   Future     Standing Expiration Date:   9/30/2020     Order Specific Question:   Manual Differential     Answer:   No         Please note that portions of this note may have been completed with a voice recognition program. Efforts were made to edit the dictations, but occasionally words are mistranscribed.

## 2019-10-01 ENCOUNTER — OUTSIDE FACILITY SERVICE (OUTPATIENT)
Dept: GASTROENTEROLOGY | Facility: CLINIC | Age: 59
End: 2019-10-01

## 2019-10-01 PROCEDURE — G0105 COLORECTAL SCRN; HI RISK IND: HCPCS | Performed by: INTERNAL MEDICINE

## 2019-10-01 PROCEDURE — G0500 MOD SEDAT ENDO SERVICE >5YRS: HCPCS | Performed by: INTERNAL MEDICINE

## 2019-10-11 ENCOUNTER — LAB (OUTPATIENT)
Dept: LAB | Facility: HOSPITAL | Age: 59
End: 2019-10-11

## 2019-10-11 ENCOUNTER — OFFICE VISIT (OUTPATIENT)
Dept: INTERNAL MEDICINE | Facility: CLINIC | Age: 59
End: 2019-10-11

## 2019-10-11 VITALS
SYSTOLIC BLOOD PRESSURE: 148 MMHG | HEART RATE: 84 BPM | HEIGHT: 70 IN | WEIGHT: 239 LBS | DIASTOLIC BLOOD PRESSURE: 82 MMHG | BODY MASS INDEX: 34.22 KG/M2

## 2019-10-11 DIAGNOSIS — I10 ESSENTIAL HYPERTENSION: ICD-10-CM

## 2019-10-11 DIAGNOSIS — D50.0 IRON DEFICIENCY ANEMIA DUE TO CHRONIC BLOOD LOSS: ICD-10-CM

## 2019-10-11 DIAGNOSIS — R55 COUGH SYNCOPE: ICD-10-CM

## 2019-10-11 DIAGNOSIS — R05.4 COUGH SYNCOPE: ICD-10-CM

## 2019-10-11 DIAGNOSIS — E87.6 HYPOKALEMIA: ICD-10-CM

## 2019-10-11 DIAGNOSIS — I10 ESSENTIAL HYPERTENSION: Primary | ICD-10-CM

## 2019-10-11 PROCEDURE — 80048 BASIC METABOLIC PNL TOTAL CA: CPT

## 2019-10-11 PROCEDURE — 90471 IMMUNIZATION ADMIN: CPT | Performed by: INTERNAL MEDICINE

## 2019-10-11 PROCEDURE — 99214 OFFICE O/P EST MOD 30 MIN: CPT | Performed by: INTERNAL MEDICINE

## 2019-10-11 PROCEDURE — 90674 CCIIV4 VAC NO PRSV 0.5 ML IM: CPT | Performed by: INTERNAL MEDICINE

## 2019-10-11 RX ORDER — PSYLLIUM HUSK 3.4 G/7G
POWDER ORAL
Qty: 90 EACH | Refills: 0 | Status: SHIPPED | OUTPATIENT
Start: 2019-10-11 | End: 2020-01-14

## 2019-10-11 NOTE — PROGRESS NOTES
[facility]     Bala Staley III  1960   [id number]      Patient Care Team:  Wilfred Kim MD as PCP - General (Internal Medicine)    Chief Complaint::   Chief Complaint   Patient presents with   • Hypertension   • Hyperlipidemia   • Anemia        HPI  Mr. Staley comes in for follow-up of his iron deficiency anemia, hypertension, and hypokalemia.  He feels considerably better taking potassium.  He admits that they were away for a weekend and he forgot his potassium and he had a significant worsening of symptoms.  He also continues taking iron under the supervision of oncology.  His cough persists but he has had no further episodes of severe cough syncope.    Chronic Conditions:      Patient Active Problem List   Diagnosis   • Essential hypertension   • Mixed hyperlipidemia   • Class 2 obesity due to excess calories without serious comorbidity in adult   • Reactive depression   • Gastroesophageal reflux disease without esophagitis   • Vitamin D deficiency   • Chronic low back pain   • Dyspnea on exertion   • Syncope, tussive   • Hypokalemia   • Cough syncope   • Allergic rhinitis   • Cough   • Hypercholesterolemia   • Iron deficiency anemia   • Vasovagal syncope   • Hernia of abdominal cavity        Past Medical History:   Diagnosis Date   • Anemia     iron deficiency   • Chronic low back pain 3/14/2018   • Colonic polyp    • ED (erectile dysfunction)    • Esophagitis    • Essential hypertension 3/14/2018   • Gastroesophageal reflux disease without esophagitis 3/14/2018   • Hernia of abdominal cavity    • Hyperlipidemia    • Reactive depression 3/14/2018   • Tennis elbow    • Vitamin D deficiency 3/14/2018       Past Surgical History:   Procedure Laterality Date   • APPENDECTOMY     • CARDIAC CATHETERIZATION  2011   • ELBOW ARTHROPLASTY      Right    • ENDOSCOPY  2011   • ENDOSCOPY  2008    with biopsy   • ESOPHAGEAL MOTILITY STUDY N/A 6/27/2019    Procedure: MANOMETRY;  Surgeon: Sebastián  Mark KENNEDY MD;  Location: [facility] ENDOSCOPY;  Service: Gastroenterology   • FOOT SURGERY      left foot        Family History   Problem Relation Age of Onset   • Alzheimer's disease Mother    • Heart disease Father    • Heart attack Father    • Colon cancer Father    • Coronary artery disease Father    • No Known Problems Sister    • Cancer Paternal Grandmother    • Lung cancer Paternal Grandmother        Social History     Socioeconomic History   • Marital status:      Spouse name: Not on file   • Number of children: Not on file   • Years of education: Not on file   • Highest education level: Not on file   Tobacco Use   • Smoking status: Never Smoker   • Smokeless tobacco: Former User     Types: Chew   Substance and Sexual Activity   • Alcohol use: Yes     Alcohol/week: 1.2 oz     Types: 2 Shots of liquor per week     Comment: per day, [place]    • Drug use: No   • Sexual activity: Yes     Partners: Female       Allergies   Allergen Reactions   • Sulfa Antibiotics Unknown (See Comments)     Pt unaware of this allergy         Current Outpatient Medications:   •  aspirin 81 MG EC tablet, Take 81 mg by mouth Daily., Disp: , Rfl:   •  atorvastatin (LIPITOR) 40 MG tablet, Daily., Disp: , Rfl: 0  •  azelastine (ASTELIN) 0.1 % nasal spray, 2 sprays 2 (Two) Times a Day., Disp: , Rfl: 0  •  calcium polycarbophil (FIBERCON) 625 MG tablet, Take 625 mg by mouth 4 (Four) Times a Day., Disp: , Rfl:   •  cetirizine (zyrTEC) 10 MG tablet, Take 10 mg by mouth Daily., Disp: , Rfl:   •  chlorthalidone (HYGROTON) 25 MG tablet, take 1 tablet by mouth once daily, Disp: 90 tablet, Rfl: 3  •  Esomeprazole Magnesium (NEXIUM PO), Take  by mouth., Disp: , Rfl:   •  ferrous sulfate 325 (65 FE) MG tablet, Take 1 tablet by mouth Daily With Breakfast., Disp: 60 tablet, Rfl: 3  •  fluticasone (FLONASE) 50 MCG/ACT nasal spray, 2 sprays into the nostril(s) as directed by provider Daily., Disp: , Rfl:   •  metoclopramide (REGLAN) 10 MG  "tablet, take 1 tablet by mouth AT MEAL TIME AND 1 TABLET BY MOUTH AT BEDTIME, Disp: 120 tablet, Rfl: 1  •  potassium chloride (K-DUR,KLOR-CON) 20 MEQ CR tablet, Take 1 tablet by mouth Daily., Disp: 30 tablet, Rfl: 5  •  RA VITAMIN B-12 TR 1000 MCG tablet controlled-release, take 1 tablet by mouth once daily, Disp: 90 each, Rfl: 0  •  vitamin D (ERGOCALCIFEROL) 07633 units capsule capsule, take 1 capsule by mouth every week, Disp: 4 capsule, Rfl: 5    Review of Systems   Constitutional: Negative for chills, fatigue and fever.   HENT: Negative for congestion, ear pain and sinus pressure.    Respiratory: Negative for cough, chest tightness, shortness of breath and wheezing.    Cardiovascular: Negative for chest pain and palpitations.   Gastrointestinal: Negative for abdominal pain, blood in stool and constipation.   Skin: Negative for color change.   Allergic/Immunologic: Negative for environmental allergies.   Neurological: Negative for dizziness, speech difficulty and headache.   Psychiatric/Behavioral: Negative for decreased concentration. The patient is not nervous/anxious.         Vital Signs  Vitals:    10/11/19 1626   BP: 148/82   BP Location: Right arm   Patient Position: Sitting   Cuff Size: Adult   Pulse: 84   Weight: 108 kg (239 lb)   Height: 177.8 cm (70\")   PainSc:   8   PainLoc: Shoulder       Physical Exam   Constitutional: He is oriented to person, place, and time. He appears well-developed and well-nourished. He is obese.  HENT:   Head: Normocephalic and atraumatic.   Cardiovascular: Normal rate, regular rhythm and normal heart sounds.   No murmur heard.  Pulmonary/Chest: Effort normal and breath sounds normal.   Neurological: He is alert and oriented to person, place, and time.   Psychiatric: He has a normal mood and affect.   Vitals reviewed.     Procedures    ACE III MINI             Assessment/Plan:    Bala was seen today for hypertension, hyperlipidemia and anemia.    Diagnoses and all orders " for this visit:    Essential hypertension  -     Basic Metabolic Panel; Future    Cough syncope    Iron deficiency anemia due to chronic blood loss    Hypokalemia    Other orders  -     Flucelvax Quad=>4Years (PFS)    Plan     Blood pressure is well controlled    Cough persists, he has had an extensive pulmonary work-up, but Dr. Leos may refer for lung biopsy.    He continues taking oral iron and vitamin C.  He feels better.  Hopefully he can avoid parenteral iron.  He will follow-up with Dr. Leos as scheduled.    Potassium replacement has made a significant difference.  Potassium level is pending.    Plan of care reviewed with patient at the conclusion of today's visit. Education was provided regarding diagnosis, management, and any prescribed or recommended OTC medications.Patient verbalizes understanding of and agreement with management plan.         Wilfred Kim MD

## 2019-10-12 LAB
ANION GAP SERPL CALCULATED.3IONS-SCNC: 14.2 MMOL/L (ref 5–15)
BUN BLD-MCNC: 10 MG/DL (ref 6–20)
BUN/CREAT SERPL: 9.6 (ref 7–25)
CALCIUM SPEC-SCNC: 9.7 MG/DL (ref 8.6–10.5)
CHLORIDE SERPL-SCNC: 94 MMOL/L (ref 98–107)
CO2 SERPL-SCNC: 27.8 MMOL/L (ref 22–29)
CREAT BLD-MCNC: 1.04 MG/DL (ref 0.76–1.27)
GFR SERPL CREATININE-BSD FRML MDRD: 73 ML/MIN/1.73
GLUCOSE BLD-MCNC: 95 MG/DL (ref 65–99)
POTASSIUM BLD-SCNC: 3.3 MMOL/L (ref 3.5–5.2)
SODIUM BLD-SCNC: 136 MMOL/L (ref 136–145)

## 2019-10-24 ENCOUNTER — TELEPHONE (OUTPATIENT)
Dept: INTERNAL MEDICINE | Facility: CLINIC | Age: 59
End: 2019-10-24

## 2019-10-24 RX ORDER — POTASSIUM CHLORIDE 20 MEQ/1
20 TABLET, EXTENDED RELEASE ORAL 2 TIMES DAILY
Qty: 60 TABLET | Refills: 5 | Status: SHIPPED | OUTPATIENT
Start: 2019-10-24 | End: 2019-11-19 | Stop reason: SDUPTHER

## 2019-10-24 NOTE — TELEPHONE ENCOUNTER
PT CALLED REQUESTING A NEW RX OF THE REQUESTED RX. HE WAS INSTRUCTED TO INCREASE HIS DOSAGE AND HAS RUN OUT EARLY AND THE PHARMACY SAID IT CAN'T BE REFILLED YET.     JACK FELICIANO RD

## 2019-10-28 ENCOUNTER — OFFICE VISIT (OUTPATIENT)
Dept: ONCOLOGY | Facility: CLINIC | Age: 59
End: 2019-10-28

## 2019-10-28 ENCOUNTER — LAB (OUTPATIENT)
Dept: LAB | Facility: HOSPITAL | Age: 59
End: 2019-10-28

## 2019-10-28 VITALS
HEIGHT: 70 IN | SYSTOLIC BLOOD PRESSURE: 155 MMHG | DIASTOLIC BLOOD PRESSURE: 84 MMHG | BODY MASS INDEX: 34.5 KG/M2 | WEIGHT: 241 LBS | HEART RATE: 91 BPM | OXYGEN SATURATION: 98 % | TEMPERATURE: 97.7 F | RESPIRATION RATE: 18 BRPM

## 2019-10-28 DIAGNOSIS — D50.0 IRON DEFICIENCY ANEMIA DUE TO CHRONIC BLOOD LOSS: ICD-10-CM

## 2019-10-28 DIAGNOSIS — D50.0 IRON DEFICIENCY ANEMIA DUE TO CHRONIC BLOOD LOSS: Primary | ICD-10-CM

## 2019-10-28 LAB
ERYTHROCYTE [DISTWIDTH] IN BLOOD BY AUTOMATED COUNT: 30.2 % (ref 12.3–15.4)
FERRITIN SERPL-MCNC: 45.61 NG/ML (ref 30–400)
HCT VFR BLD AUTO: 42.2 % (ref 37.5–51)
HGB BLD-MCNC: 12.9 G/DL (ref 13–17.7)
IRON 24H UR-MRATE: 94 MCG/DL (ref 59–158)
IRON SATN MFR SERPL: 18 % (ref 20–50)
LYMPHOCYTES # BLD AUTO: 1.1 10*3/MM3 (ref 0.7–3.1)
LYMPHOCYTES NFR BLD AUTO: 24.6 % (ref 19.6–45.3)
MCH RBC QN AUTO: 24.2 PG (ref 26.6–33)
MCHC RBC AUTO-ENTMCNC: 30.6 G/DL (ref 31.5–35.7)
MCV RBC AUTO: 79.2 FL (ref 79–97)
MONOCYTES # BLD AUTO: 0.5 10*3/MM3 (ref 0.1–0.9)
MONOCYTES NFR BLD AUTO: 11.5 % (ref 5–12)
NEUTROPHILS # BLD AUTO: 2.9 10*3/MM3 (ref 1.7–7)
NEUTROPHILS NFR BLD AUTO: 63.9 % (ref 42.7–76)
PLATELET # BLD AUTO: 260 10*3/MM3 (ref 140–450)
PMV BLD AUTO: 8.2 FL (ref 6–12)
RBC # BLD AUTO: 5.33 10*6/MM3 (ref 4.14–5.8)
TIBC SERPL-MCNC: 514 MCG/DL (ref 298–536)
TRANSFERRIN SERPL-MCNC: 345 MG/DL (ref 200–360)
WBC NRBC COR # BLD: 4.5 10*3/MM3 (ref 3.4–10.8)

## 2019-10-28 PROCEDURE — 36415 COLL VENOUS BLD VENIPUNCTURE: CPT

## 2019-10-28 PROCEDURE — 84466 ASSAY OF TRANSFERRIN: CPT

## 2019-10-28 PROCEDURE — 99213 OFFICE O/P EST LOW 20 MIN: CPT | Performed by: INTERNAL MEDICINE

## 2019-10-28 PROCEDURE — 83540 ASSAY OF IRON: CPT

## 2019-10-28 PROCEDURE — 82728 ASSAY OF FERRITIN: CPT

## 2019-10-28 PROCEDURE — 85025 COMPLETE CBC W/AUTO DIFF WBC: CPT

## 2019-10-28 NOTE — PROGRESS NOTES
PROBLEM LIST:  1.  Iron deficiency anemia secondary to malabsorption due to chronic PPI use and chronic blood loss from likely diverticulosis.  2.  Negative EGD and colonoscopy      REASON FOR VISIT: Iron deficiency anemia    HISTORY OF PRESENT ILLNESS:   59 y.o.  male presents today for follow-up of his iron deficiency anemia.  Since I last saw him he is doing remarkably well.  He reports his fatigue is improved.  He denies any issues with obvious bleeding.  He underwent a colonoscopy which showed diverticulosis.  But no sign of obvious bleeding.    Past medical history, social history and family history was reviewed and unchanged from prior visit.    Review of Systems:    Review of Systems - Oncology   A comprehensive 14 point review of systems was performed and was negative except as mentioned.      Medications:        Current Outpatient Medications:   •  aspirin 81 MG EC tablet, Take 81 mg by mouth Daily., Disp: , Rfl:   •  atorvastatin (LIPITOR) 40 MG tablet, Daily., Disp: , Rfl: 0  •  azelastine (ASTELIN) 0.1 % nasal spray, 2 sprays 2 (Two) Times a Day., Disp: , Rfl: 0  •  calcium polycarbophil (FIBERCON) 625 MG tablet, Take 625 mg by mouth 4 (Four) Times a Day., Disp: , Rfl:   •  cetirizine (zyrTEC) 10 MG tablet, Take 10 mg by mouth Daily., Disp: , Rfl:   •  chlorthalidone (HYGROTON) 25 MG tablet, take 1 tablet by mouth once daily, Disp: 90 tablet, Rfl: 3  •  Esomeprazole Magnesium (NEXIUM PO), Take  by mouth., Disp: , Rfl:   •  ferrous sulfate 325 (65 FE) MG tablet, Take 1 tablet by mouth Daily With Breakfast. (Patient taking differently: Take 325 mg by mouth 2 (Two) Times a Day.), Disp: 60 tablet, Rfl: 3  •  fluticasone (FLONASE) 50 MCG/ACT nasal spray, 2 sprays into the nostril(s) as directed by provider Daily., Disp: , Rfl:   •  metoclopramide (REGLAN) 10 MG tablet, take 1 tablet by mouth AT MEAL TIME AND 1 TABLET BY MOUTH AT BEDTIME, Disp: 120 tablet, Rfl: 1  •  potassium chloride (K-DUR,KLOR-CON) 20 MEQ  "CR tablet, Take 1 tablet by mouth 2 (Two) Times a Day., Disp: 60 tablet, Rfl: 5  •  RA VITAMIN B-12 TR 1000 MCG tablet controlled-release, take 1 tablet by mouth once daily, Disp: 90 each, Rfl: 0  •  vitamin D (ERGOCALCIFEROL) 23692 units capsule capsule, take 1 capsule by mouth every week, Disp: 4 capsule, Rfl: 5      ALLERGIES:    Allergies   Allergen Reactions   • Sulfa Antibiotics Unknown (See Comments)     Pt unaware of this allergy         Physical Exam    VITAL SIGNS:  /84 Comment: LUE  Pulse 91   Temp 97.7 °F (36.5 °C) (Temporal)   Resp 18   Ht 177.8 cm (70\")   Wt 109 kg (241 lb)   SpO2 98% Comment: RA  BMI 34.58 kg/m²     Wt Readings from Last 3 Encounters:   10/28/19 109 kg (241 lb)   10/11/19 108 kg (239 lb)   09/30/19 109 kg (241 lb)       Body mass index is 34.58 kg/m². Body surface area is 2.26 meters squared.         Performance Status: 0    General: well appearing, in no acute distress  HEENT: sclera anicteric, oropharynx clear, neck is supple  Lymphatics: no cervical, supraclavicular, or axillary adenopathy  Cardiovascular: regular rate and rhythm, no murmurs, rubs or gallops  Lungs: clear to auscultation bilaterally  Abdomen: soft, nontender, nondistended.  No palpable organomegaly  Extremities: no lower extremity edema  Skin: no rashes, lesions, bruising, or petechiae  Msk:  Shows no weakness of the large muscle groups  Psych: Mood is stable        RECENT LABS:    Lab Results   Component Value Date    HGB 12.9 (L) 10/28/2019    HCT 42.2 10/28/2019    MCV 79.2 10/28/2019     10/28/2019    WBC 4.50 10/28/2019    NEUTROABS 2.90 10/28/2019    LYMPHSABS 1.10 10/28/2019    MONOSABS 0.50 10/28/2019    EOSABS 0.07 09/05/2019    BASOSABS 0.03 08/29/2019       Lab Results   Component Value Date    GLUCOSE 95 10/11/2019    BUN 10 10/11/2019    CREATININE 1.04 10/11/2019     10/11/2019    K 3.3 (L) 10/11/2019    CL 94 (L) 10/11/2019    CO2 27.8 10/11/2019    CALCIUM 9.7 10/11/2019    " PROTEINTOT 7.5 08/29/2019    ALBUMIN 4.90 08/29/2019    BILITOT 0.6 08/29/2019    ALKPHOS 89 08/29/2019    AST 34 08/29/2019    ALT 32 08/29/2019       Nm Gastric Emptying    Result Date: 7/15/2019  Normal gastric emptying study.   D:  07/15/2019 E:  07/15/2019    This report was finalized on 7/15/2019 3:26 PM by Dr. Melba Paulson MD.      Fl Esophagram Complete    Result Date: 7/15/2019  1. Mild esophageal dysmotility 2. Moderate gastroesophageal reflux to the level of the thoracic inlet 3. Small sized sliding-type hiatal hernia    This report was finalized on 7/15/2019 5:14 PM by Dr. Bhaskar Berrios.        Lab Results   Component Value Date    HGB 12.9 (L) 10/28/2019    HGB 8.5 (L) 09/30/2019    HGB 8.2 (L) 09/05/2019     Lab Results   Component Value Date    FERRITIN 11.41 (L) 09/30/2019    FERRITIN 6.67 (L) 09/05/2019     Lab Results   Component Value Date    MCV 79.2 10/28/2019    MCV 68.2 (L) 09/30/2019    MCV 74.8 (L) 09/05/2019     Lab Results   Component Value Date    TIBC 514 09/30/2019    TIBC 522 09/05/2019     Lab Results   Component Value Date    LABIRON 4 (L) 09/30/2019    LABIRON 2 (L) 09/05/2019     Lab Results   Component Value Date    IRON 20 (L) 09/30/2019    IRON 13 (L) 09/05/2019       Lab Results   Component Value Date    [id number] 913 08/29/2019     No results found for: FOLATE      Assessment/Plan    1.  Iron deficiency anemia secondary to chronic blood loss from likely diverticular disease and malabsorption from Nexium use.  He is actually doing remarkably well on oral supplementation.  His numbers look back to normal.  At this point we will continue watching him closely.  I will repeat his labs in  4 months to make sure he is correcting his numbers well.  If he is then he does not need any further follow-up.  If he is not then he may consider IV iron at that point.          I spent a total of 15 minutes in direct patient care, greater than 10 minutes (greater than 50%) were spent in  coordination of care, and counseling the patient regarding iron deficiency anemia. Answered any questions patient had with medication and plan.      Daisy Hogan MD  [facility] Hematology and Oncology    Return on: 02/21/20  Return in (Approximately): 4 months    Orders Placed This Encounter   Procedures   • Ferritin   • Iron Profile   • Ferritin   • Iron Profile   • CBC & Differential   • CBC & Differential       10/28/2019         Please note that portions of this note may have been completed with a voice recognition program. Efforts were made to edit the dictations, but occasionally words are mistranscribed.

## 2019-11-05 RX ORDER — METOCLOPRAMIDE 10 MG/1
TABLET ORAL
Qty: 120 TABLET | Refills: 1 | OUTPATIENT
Start: 2019-11-05

## 2019-11-07 ENCOUNTER — TELEPHONE (OUTPATIENT)
Dept: INTERNAL MEDICINE | Facility: CLINIC | Age: 59
End: 2019-11-07

## 2019-11-07 RX ORDER — METOCLOPRAMIDE 10 MG/1
10 TABLET ORAL 2 TIMES DAILY
Qty: 180 TABLET | Refills: 1 | Status: SHIPPED | OUTPATIENT
Start: 2019-11-07 | End: 2020-07-16

## 2019-11-07 NOTE — TELEPHONE ENCOUNTER
PT CALLED AND IS NOT SURE WHY THE FOLLOWING MEDICATION IS NOT GETTING FILLED - HE HAS BEEN 2 DAYS WITHOUT AND HIS STOMACH IS NOT FEELING WELL -    CAN YOU PLEASE ADVISE PHARM AND PATIENT?    metoclopramide (REGLAN) 10 MG tablet    PT CALL BACK [phone]    [facility]

## 2019-11-13 RX ORDER — ATORVASTATIN CALCIUM 40 MG/1
40 TABLET, FILM COATED ORAL DAILY
Qty: 90 TABLET | Refills: 3 | Status: SHIPPED | OUTPATIENT
Start: 2019-11-13 | End: 2019-11-13 | Stop reason: SDUPTHER

## 2019-11-13 RX ORDER — ATORVASTATIN CALCIUM 40 MG/1
40 TABLET, FILM COATED ORAL DAILY
Qty: 90 TABLET | Refills: 3 | Status: SHIPPED | OUTPATIENT
Start: 2019-11-13 | End: 2020-03-17

## 2019-11-19 ENCOUNTER — TELEPHONE (OUTPATIENT)
Dept: INTERNAL MEDICINE | Facility: CLINIC | Age: 59
End: 2019-11-19

## 2019-11-19 RX ORDER — ERGOCALCIFEROL 1.25 MG/1
50000 CAPSULE ORAL WEEKLY
Qty: 4 CAPSULE | Refills: 5 | Status: SHIPPED | OUTPATIENT
Start: 2019-11-19 | End: 2020-05-05

## 2019-11-19 RX ORDER — POTASSIUM CHLORIDE 20 MEQ/1
20 TABLET, EXTENDED RELEASE ORAL 2 TIMES DAILY
Qty: 60 TABLET | Refills: 5 | Status: SHIPPED | OUTPATIENT
Start: 2019-11-19 | End: 2020-05-19

## 2019-11-19 NOTE — TELEPHONE ENCOUNTER
"Pt is requesting Vit D , and his Potassium Chloride he is going out of town on Thursday 11/21 and returning on 11/27 pt stated he will be out of potassium b4 he comes back , he stated someone will need to call pharmacy \"[facility] \" and advise them to fill rx, any questions call [phone]  "

## 2020-01-14 RX ORDER — PSYLLIUM HUSK 3.4 G/7G
POWDER ORAL
Qty: 90 EACH | Refills: 3 | Status: SHIPPED | OUTPATIENT
Start: 2020-01-14

## 2020-01-24 ENCOUNTER — OFFICE VISIT (OUTPATIENT)
Dept: INTERNAL MEDICINE | Facility: CLINIC | Age: 60
End: 2020-01-24

## 2020-01-24 VITALS
DIASTOLIC BLOOD PRESSURE: 90 MMHG | HEART RATE: 88 BPM | HEIGHT: 70 IN | BODY MASS INDEX: 34.93 KG/M2 | SYSTOLIC BLOOD PRESSURE: 148 MMHG | WEIGHT: 244 LBS

## 2020-01-24 DIAGNOSIS — E87.6 HYPOKALEMIA: ICD-10-CM

## 2020-01-24 DIAGNOSIS — I10 ESSENTIAL HYPERTENSION: Primary | ICD-10-CM

## 2020-01-24 DIAGNOSIS — R73.9 HYPERGLYCEMIA: ICD-10-CM

## 2020-01-24 DIAGNOSIS — R55 SYNCOPE, TUSSIVE: ICD-10-CM

## 2020-01-24 DIAGNOSIS — F32.9 REACTIVE DEPRESSION: ICD-10-CM

## 2020-01-24 DIAGNOSIS — R05.4 SYNCOPE, TUSSIVE: ICD-10-CM

## 2020-01-24 DIAGNOSIS — D50.0 IRON DEFICIENCY ANEMIA DUE TO CHRONIC BLOOD LOSS: ICD-10-CM

## 2020-01-24 DIAGNOSIS — E66.09 CLASS 2 OBESITY DUE TO EXCESS CALORIES WITHOUT SERIOUS COMORBIDITY WITH BODY MASS INDEX (BMI) OF 35.0 TO 35.9 IN ADULT: ICD-10-CM

## 2020-01-24 DIAGNOSIS — E55.9 VITAMIN D DEFICIENCY: ICD-10-CM

## 2020-01-24 DIAGNOSIS — E78.2 MIXED HYPERLIPIDEMIA: ICD-10-CM

## 2020-01-24 PROCEDURE — 99214 OFFICE O/P EST MOD 30 MIN: CPT | Performed by: INTERNAL MEDICINE

## 2020-01-24 RX ORDER — OLMESARTAN MEDOXOMIL 20 MG/1
20 TABLET ORAL DAILY
Qty: 30 TABLET | Refills: 2 | Status: SHIPPED | OUTPATIENT
Start: 2020-01-24 | End: 2020-04-23

## 2020-01-24 RX ORDER — BUPROPION HYDROCHLORIDE 150 MG/1
150 TABLET ORAL DAILY
Qty: 30 TABLET | Refills: 2 | Status: SHIPPED | OUTPATIENT
Start: 2020-01-24 | End: 2020-04-23

## 2020-01-24 NOTE — PROGRESS NOTES
[facility]     Bala Staley III  1960   [id number]      Patient Care Team:  Wilfred Kim MD as PCP - General (Internal Medicine)    Chief Complaint::   Chief Complaint   Patient presents with   • Hyperlipidemia   • Hypertension        HPI  Mr Staley comes in for follow up of her hypertension, hyperlipidemia, hyperglycemia, vitamin D deficiency, hypokalemia and obesity.  He continues to see Dr Leos for iron deficiency.  He still feels much better since iron deficiency and hypokalemia were identified and treatment, but despite that, says he still feels depressed.  His blood pressure remains high at home. He still has shoulder pain.  A friend gave him diclofenac gel, which helped considerably.     Chronic Conditions:      Patient Active Problem List   Diagnosis   • Essential hypertension   • Mixed hyperlipidemia   • Class 2 obesity due to excess calories without serious comorbidity in adult   • Reactive depression   • Gastroesophageal reflux disease without esophagitis   • Vitamin D deficiency   • Chronic low back pain   • Dyspnea on exertion   • Syncope, tussive   • Hypokalemia   • Cough syncope   • Allergic rhinitis   • Cough   • Hypercholesterolemia   • Iron deficiency anemia   • Vasovagal syncope   • Hernia of abdominal cavity        Past Medical History:   Diagnosis Date   • Anemia     iron deficiency   • Chronic low back pain 3/14/2018   • Colonic polyp    • ED (erectile dysfunction)    • Esophagitis    • Essential hypertension 3/14/2018   • Gastroesophageal reflux disease without esophagitis 3/14/2018   • Hernia of abdominal cavity    • Hyperlipidemia    • Reactive depression 3/14/2018   • Tennis elbow    • Vitamin D deficiency 3/14/2018       Past Surgical History:   Procedure Laterality Date   • APPENDECTOMY     • CARDIAC CATHETERIZATION  2011   • ELBOW ARTHROPLASTY      Right    • ENDOSCOPY  2011   • ENDOSCOPY  2008    with biopsy   • ESOPHAGEAL MOTILITY STUDY N/A 6/27/2019     Procedure: MANOMETRY;  Surgeon: Mark Lowery MD;  Location: [facility] ENDOSCOPY;  Service: Gastroenterology   • FOOT SURGERY      left foot        Family History   Problem Relation Age of Onset   • Alzheimer's disease Mother    • Heart disease Father    • Heart attack Father    • Colon cancer Father    • Coronary artery disease Father    • No Known Problems Sister    • Cancer Paternal Grandmother    • Lung cancer Paternal Grandmother        Social History     Socioeconomic History   • Marital status:      Spouse name: Not on file   • Number of children: Not on file   • Years of education: Not on file   • Highest education level: Not on file   Tobacco Use   • Smoking status: Never Smoker   • Smokeless tobacco: Former User     Types: Chew   Substance and Sexual Activity   • Alcohol use: Yes     Alcohol/week: 2.0 standard drinks     Types: 2 Shots of liquor per week     Comment: per day, [place]    • Drug use: No   • Sexual activity: Yes     Partners: Female       Allergies   Allergen Reactions   • Sulfa Antibiotics Unknown (See Comments)     Pt unaware of this allergy         Current Outpatient Medications:   •  aspirin 81 MG EC tablet, Take 81 mg by mouth Daily., Disp: , Rfl:   •  atorvastatin (LIPITOR) 40 MG tablet, Take 1 tablet by mouth Daily., Disp: 90 tablet, Rfl: 3  •  azelastine (ASTELIN) 0.1 % nasal spray, 2 sprays 2 (Two) Times a Day., Disp: , Rfl: 0  •  calcium polycarbophil (FIBERCON) 625 MG tablet, Take 625 mg by mouth 4 (Four) Times a Day., Disp: , Rfl:   •  cetirizine (zyrTEC) 10 MG tablet, Take 10 mg by mouth Daily., Disp: , Rfl:   •  chlorthalidone (HYGROTON) 25 MG tablet, take 1 tablet by mouth once daily, Disp: 90 tablet, Rfl: 3  •  Esomeprazole Magnesium (NEXIUM PO), Take  by mouth., Disp: , Rfl:   •  ferrous sulfate 325 (65 FE) MG tablet, Take 1 tablet by mouth Daily With Breakfast. (Patient taking differently: Take 325 mg by mouth 2 (Two) Times a Day.), Disp: 60 tablet, Rfl: 3  •   "fluticasone (FLONASE) 50 MCG/ACT nasal spray, 2 sprays into the nostril(s) as directed by provider Daily., Disp: , Rfl:   •  magnesium oxide (MAGOX) 400 (241.3 Mg) MG tablet tablet, Take 400 mg by mouth Daily., Disp: , Rfl:   •  metoclopramide (REGLAN) 10 MG tablet, Take 1 tablet by mouth 2 (Two) Times a Day., Disp: 180 tablet, Rfl: 1  •  potassium chloride (K-DUR,KLOR-CON) 20 MEQ CR tablet, Take 1 tablet by mouth 2 (Two) Times a Day., Disp: 60 tablet, Rfl: 5  •  RA VITAMIN B-12 TR 1000 MCG tablet controlled-release, take 1 tablet by mouth once daily, Disp: 90 each, Rfl: 3  •  vitamin D (ERGOCALCIFEROL) 1.25 MG (01149 UT) capsule capsule, Take 1 capsule by mouth 1 (One) Time Per Week., Disp: 4 capsule, Rfl: 5  •  buPROPion XL (WELLBUTRIN XL) 150 MG 24 hr tablet, Take 1 tablet by mouth Daily., Disp: 30 tablet, Rfl: 2  •  diclofenac (VOLTAREN) 1 % gel gel, Apply 4 g topically to the appropriate area as directed 4 (Four) Times a Day As Needed (pain)., Disp: 100 g, Rfl: 5  •  olmesartan (BENICAR) 20 MG tablet, Take 1 tablet by mouth Daily., Disp: 30 tablet, Rfl: 2    Review of Systems   Constitutional: Negative for chills, fatigue and fever.   HENT: Negative for congestion, ear pain and sinus pressure.    Respiratory: Negative for cough, chest tightness, shortness of breath and wheezing.    Cardiovascular: Negative for chest pain and palpitations.   Gastrointestinal: Negative for abdominal pain, blood in stool and constipation.   Skin: Negative for color change.   Allergic/Immunologic: Negative for environmental allergies.   Neurological: Negative for dizziness, speech difficulty and headache.   Psychiatric/Behavioral: Negative for decreased concentration. The patient is not nervous/anxious.         Vital Signs  Vitals:    01/24/20 1437   BP: 148/90   BP Location: Left arm   Patient Position: Sitting   Cuff Size: Adult   Pulse: 88   Weight: 111 kg (244 lb)   Height: 177.8 cm (70\")   PainSc:   8   PainLoc: " Shoulder  Comment: wrist       Physical Exam   Constitutional: He is oriented to person, place, and time. He appears well-developed and well-nourished. He is obese.  HENT:   Head: Normocephalic and atraumatic.   Cardiovascular: Normal rate, regular rhythm and normal heart sounds.   No murmur heard.  Pulmonary/Chest: Effort normal and breath sounds normal.   Neurological: He is alert and oriented to person, place, and time.   Psychiatric: He has a normal mood and affect.   Vitals reviewed.     Procedures    ACE III MINI             Assessment/Plan:    Bala was seen today for hyperlipidemia and hypertension.    Diagnoses and all orders for this visit:    Essential hypertension  -     Comprehensive Metabolic Panel; Future    Mixed hyperlipidemia  -     Lipid Panel; Future    Vitamin D deficiency  -     Vitamin D 25 Hydroxy; Future    Hyperglycemia  -     Hemoglobin A1c; Future    Syncope, tussive    Iron deficiency anemia due to chronic blood loss    Hypokalemia    Class 2 obesity due to excess calories without serious comorbidity with body mass index (BMI) of 35.0 to 35.9 in adult    Reactive depression    Other orders  -     buPROPion XL (WELLBUTRIN XL) 150 MG 24 hr tablet; Take 1 tablet by mouth Daily.  -     olmesartan (BENICAR) 20 MG tablet; Take 1 tablet by mouth Daily.  -     diclofenac (VOLTAREN) 1 % gel gel; Apply 4 g topically to the appropriate area as directed 4 (Four) Times a Day As Needed (pain).    Plan    Blood pressure is not controlled.  Will add olmesartan and continue chlorthalidone.      Lipids, vitamin D, and A1c are pending.     His cough has largely resolved.  He had an extensive, negative GI and pulmonary workup.     He will continue to see Dr Leos for his iron deficiency.      Potassium is pending.  Continue potassium supplementation.      BMI is 35, which given comorbidities, meets criteria for morbid obesity.  He will continue working on low carb diet.  Also discussed intermittent fasting.       Trial of bupropion for depression.  He also has some anxiety, but this is the best choice to avoid further weight gain.  He will start bupropion first, than, after a few days, will start omesartan.     Plan of care reviewed with patient at the conclusion of today's visit. Education was provided regarding diagnosis, management, and any prescribed or recommended OTC medications.Patient verbalizes understanding of and agreement with management plan.         Wilfred Kim MD           Answers for HPI/ROS submitted by the patient on 1/22/2020   Hypertension  Chronicity: recurrent  Onset: more than 1 year ago  Progression since onset: waxing and waning  Condition status: resistant  anxiety: Yes  malaise/fatigue: Yes  peripheral edema: Yes  sweats: Yes  Agents associated with hypertension: no associated agents  CAD risks: dyslipidemia, obesity, sedentary lifestyle, stress  Compliance problems: exercise

## 2020-02-21 ENCOUNTER — OFFICE VISIT (OUTPATIENT)
Dept: ONCOLOGY | Facility: CLINIC | Age: 60
End: 2020-02-21

## 2020-02-21 ENCOUNTER — LAB (OUTPATIENT)
Dept: LAB | Facility: HOSPITAL | Age: 60
End: 2020-02-21

## 2020-02-21 VITALS
WEIGHT: 244 LBS | HEART RATE: 96 BPM | DIASTOLIC BLOOD PRESSURE: 72 MMHG | BODY MASS INDEX: 34.93 KG/M2 | TEMPERATURE: 97 F | HEIGHT: 70 IN | OXYGEN SATURATION: 98 % | SYSTOLIC BLOOD PRESSURE: 122 MMHG | RESPIRATION RATE: 18 BRPM

## 2020-02-21 DIAGNOSIS — I10 ESSENTIAL HYPERTENSION: ICD-10-CM

## 2020-02-21 DIAGNOSIS — D50.0 IRON DEFICIENCY ANEMIA DUE TO CHRONIC BLOOD LOSS: ICD-10-CM

## 2020-02-21 DIAGNOSIS — D50.0 IRON DEFICIENCY ANEMIA DUE TO CHRONIC BLOOD LOSS: Primary | ICD-10-CM

## 2020-02-21 DIAGNOSIS — E55.9 VITAMIN D DEFICIENCY: ICD-10-CM

## 2020-02-21 DIAGNOSIS — R73.9 HYPERGLYCEMIA: ICD-10-CM

## 2020-02-21 DIAGNOSIS — E78.2 MIXED HYPERLIPIDEMIA: ICD-10-CM

## 2020-02-21 LAB
25(OH)D3 SERPL-MCNC: 38.6 NG/ML (ref 30–100)
ALBUMIN SERPL-MCNC: 4.8 G/DL (ref 3.5–5.2)
ALBUMIN/GLOB SERPL: 1.5 G/DL
ALP SERPL-CCNC: 104 U/L (ref 39–117)
ALT SERPL W P-5'-P-CCNC: 68 U/L (ref 1–41)
ANION GAP SERPL CALCULATED.3IONS-SCNC: 12 MMOL/L (ref 5–15)
AST SERPL-CCNC: 48 U/L (ref 1–40)
BILIRUB SERPL-MCNC: 0.8 MG/DL (ref 0.2–1.2)
BUN BLD-MCNC: 16 MG/DL (ref 6–20)
BUN/CREAT SERPL: 14.5 (ref 7–25)
CALCIUM SPEC-SCNC: 10.1 MG/DL (ref 8.6–10.5)
CHLORIDE SERPL-SCNC: 94 MMOL/L (ref 98–107)
CHOLEST SERPL-MCNC: 213 MG/DL (ref 0–200)
CO2 SERPL-SCNC: 30 MMOL/L (ref 22–29)
CREAT BLD-MCNC: 1.1 MG/DL (ref 0.76–1.27)
ERYTHROCYTE [DISTWIDTH] IN BLOOD BY AUTOMATED COUNT: 15.3 % (ref 12.3–15.4)
FERRITIN SERPL-MCNC: 290.7 NG/ML (ref 30–400)
GFR SERPL CREATININE-BSD FRML MDRD: 69 ML/MIN/1.73
GLOBULIN UR ELPH-MCNC: 3.2 GM/DL
GLUCOSE BLD-MCNC: 85 MG/DL (ref 65–99)
HBA1C MFR BLD: 5.2 % (ref 4.8–5.6)
HCT VFR BLD AUTO: 52.4 % (ref 37.5–51)
HDLC SERPL-MCNC: 71 MG/DL (ref 40–60)
HGB BLD-MCNC: 17.5 G/DL (ref 13–17.7)
IRON 24H UR-MRATE: 134 MCG/DL (ref 59–158)
IRON SATN MFR SERPL: 35 % (ref 20–50)
LDLC SERPL CALC-MCNC: 105 MG/DL (ref 0–100)
LDLC/HDLC SERPL: 1.47 {RATIO}
LYMPHOCYTES # BLD AUTO: 1.2 10*3/MM3 (ref 0.7–3.1)
LYMPHOCYTES NFR BLD AUTO: 19.5 % (ref 19.6–45.3)
MCH RBC QN AUTO: 35.5 PG (ref 26.6–33)
MCHC RBC AUTO-ENTMCNC: 33.4 G/DL (ref 31.5–35.7)
MCV RBC AUTO: 106.2 FL (ref 79–97)
MONOCYTES # BLD AUTO: 0.4 10*3/MM3 (ref 0.1–0.9)
MONOCYTES NFR BLD AUTO: 7.2 % (ref 5–12)
NEUTROPHILS # BLD AUTO: 4.5 10*3/MM3 (ref 1.7–7)
NEUTROPHILS NFR BLD AUTO: 73.3 % (ref 42.7–76)
PLATELET # BLD AUTO: 200 10*3/MM3 (ref 140–450)
PMV BLD AUTO: 7.9 FL (ref 6–12)
POTASSIUM BLD-SCNC: 4 MMOL/L (ref 3.5–5.2)
PROT SERPL-MCNC: 8 G/DL (ref 6–8.5)
RBC # BLD AUTO: 4.94 10*6/MM3 (ref 4.14–5.8)
SODIUM BLD-SCNC: 136 MMOL/L (ref 136–145)
TIBC SERPL-MCNC: 381 MCG/DL (ref 298–536)
TRANSFERRIN SERPL-MCNC: 256 MG/DL (ref 200–360)
TRIGL SERPL-MCNC: 187 MG/DL (ref 0–150)
VLDLC SERPL-MCNC: 37.4 MG/DL
WBC NRBC COR # BLD: 6.2 10*3/MM3 (ref 3.4–10.8)

## 2020-02-21 PROCEDURE — 83540 ASSAY OF IRON: CPT

## 2020-02-21 PROCEDURE — 85025 COMPLETE CBC W/AUTO DIFF WBC: CPT

## 2020-02-21 PROCEDURE — 36415 COLL VENOUS BLD VENIPUNCTURE: CPT

## 2020-02-21 PROCEDURE — 82306 VITAMIN D 25 HYDROXY: CPT

## 2020-02-21 PROCEDURE — 80053 COMPREHEN METABOLIC PANEL: CPT

## 2020-02-21 PROCEDURE — 83036 HEMOGLOBIN GLYCOSYLATED A1C: CPT

## 2020-02-21 PROCEDURE — 80061 LIPID PANEL: CPT

## 2020-02-21 PROCEDURE — 99213 OFFICE O/P EST LOW 20 MIN: CPT | Performed by: NURSE PRACTITIONER

## 2020-02-21 PROCEDURE — 84466 ASSAY OF TRANSFERRIN: CPT

## 2020-02-21 PROCEDURE — 82728 ASSAY OF FERRITIN: CPT

## 2020-02-21 NOTE — PROGRESS NOTES
PROBLEM LIST:  1.  Iron deficiency anemia secondary to malabsorption due to chronic PPI use and chronic blood loss from likely diverticulosis.  2.  Negative EGD and colonoscopy      REASON FOR VISIT: Iron deficiency anemia    HISTORY OF PRESENT ILLNESS:   59 y.o.  male presents today for follow-up of his iron deficiency anemia.  He is doing well.  No new issues or complaints.        Past medical history, social history and family history was reviewed and unchanged from prior visit.    Review of Systems:    Review of Systems - Oncology   A comprehensive 14 point review of systems was performed and was negative except as mentioned.      Medications:        Current Outpatient Medications:   •  aspirin 81 MG EC tablet, Take 81 mg by mouth Daily., Disp: , Rfl:   •  atorvastatin (LIPITOR) 40 MG tablet, Take 1 tablet by mouth Daily., Disp: 90 tablet, Rfl: 3  •  azelastine (ASTELIN) 0.1 % nasal spray, 2 sprays 2 (Two) Times a Day., Disp: , Rfl: 0  •  buPROPion XL (WELLBUTRIN XL) 150 MG 24 hr tablet, Take 1 tablet by mouth Daily., Disp: 30 tablet, Rfl: 2  •  calcium polycarbophil (FIBERCON) 625 MG tablet, Take 625 mg by mouth 4 (Four) Times a Day., Disp: , Rfl:   •  cetirizine (zyrTEC) 10 MG tablet, Take 10 mg by mouth Daily., Disp: , Rfl:   •  chlorthalidone (HYGROTON) 25 MG tablet, take 1 tablet by mouth once daily, Disp: 90 tablet, Rfl: 3  •  diclofenac (VOLTAREN) 1 % gel gel, Apply 4 g topically to the appropriate area as directed 4 (Four) Times a Day As Needed (pain)., Disp: 100 g, Rfl: 5  •  Esomeprazole Magnesium (NEXIUM PO), Take  by mouth., Disp: , Rfl:   •  ferrous sulfate 325 (65 FE) MG tablet, Take 1 tablet by mouth Daily With Breakfast. (Patient taking differently: Take 325 mg by mouth 2 (Two) Times a Day.), Disp: 60 tablet, Rfl: 3  •  fluticasone (FLONASE) 50 MCG/ACT nasal spray, 2 sprays into the nostril(s) as directed by provider Daily., Disp: , Rfl:   •  magnesium oxide (MAGOX) 400 (241.3 Mg) MG tablet  "tablet, Take 400 mg by mouth Daily., Disp: , Rfl:   •  metoclopramide (REGLAN) 10 MG tablet, Take 1 tablet by mouth 2 (Two) Times a Day., Disp: 180 tablet, Rfl: 1  •  olmesartan (BENICAR) 20 MG tablet, Take 1 tablet by mouth Daily., Disp: 30 tablet, Rfl: 2  •  potassium chloride (K-DUR,KLOR-CON) 20 MEQ CR tablet, Take 1 tablet by mouth 2 (Two) Times a Day., Disp: 60 tablet, Rfl: 5  •  RA VITAMIN B-12 TR 1000 MCG tablet controlled-release, take 1 tablet by mouth once daily, Disp: 90 each, Rfl: 3  •  vitamin D (ERGOCALCIFEROL) 1.25 MG (15546 UT) capsule capsule, Take 1 capsule by mouth 1 (One) Time Per Week., Disp: 4 capsule, Rfl: 5      ALLERGIES:    Allergies   Allergen Reactions   • Sulfa Antibiotics Unknown (See Comments)     Pt unaware of this allergy         Physical Exam    VITAL SIGNS:  /72 Comment: LUE  Pulse 96   Temp 97 °F (36.1 °C) (Temporal)   Resp 18   Ht 177.8 cm (70\")   Wt 111 kg (244 lb)   SpO2 98% Comment: RA  BMI 35.01 kg/m²     Wt Readings from Last 3 Encounters:   02/21/20 111 kg (244 lb)   01/24/20 111 kg (244 lb)   10/28/19 109 kg (241 lb)       Body mass index is 35.01 kg/m². Body surface area is 2.27 meters squared.     Performance Status: 0    General: well appearing, in no acute distress  HEENT: sclera anicteric, oropharynx clear, neck is supple  Lymphatics: no cervical, supraclavicular, or axillary adenopathy  Cardiovascular: regular rate and rhythm, no murmurs, rubs or gallops  Lungs: clear to auscultation bilaterally  Abdomen: soft, nontender, nondistended.  No palpable organomegaly  Extremities: no lower extremity edema  Skin: no rashes, lesions, bruising, or petechiae  Msk:  Shows no weakness of the large muscle groups  Psych: Mood is stable        RECENT LABS:    Lab Results   Component Value Date    HGB 17.5 02/21/2020    HCT 52.4 (H) 02/21/2020    .2 (H) 02/21/2020     02/21/2020    WBC 6.20 02/21/2020    NEUTROABS 4.50 02/21/2020    LYMPHSABS 1.20 " 02/21/2020    MONOSABS 0.40 02/21/2020    EOSABS 0.07 09/05/2019    BASOSABS 0.03 08/29/2019       Lab Results   Component Value Date    GLUCOSE 95 10/11/2019    BUN 10 10/11/2019    CREATININE 1.04 10/11/2019     10/11/2019    K 3.3 (L) 10/11/2019    CL 94 (L) 10/11/2019    CO2 27.8 10/11/2019    CALCIUM 9.7 10/11/2019    PROTEINTOT 7.5 08/29/2019    ALBUMIN 4.90 08/29/2019    BILITOT 0.6 08/29/2019    ALKPHOS 89 08/29/2019    AST 34 08/29/2019    ALT 32 08/29/2019       Nm Gastric Emptying    Result Date: 7/15/2019  Normal gastric emptying study.   D:  07/15/2019 E:  07/15/2019    This report was finalized on 7/15/2019 3:26 PM by Dr. Melba Paulson MD.      Fl Esophagram Complete    Result Date: 7/15/2019  1. Mild esophageal dysmotility 2. Moderate gastroesophageal reflux to the level of the thoracic inlet 3. Small sized sliding-type hiatal hernia    This report was finalized on 7/15/2019 5:14 PM by Dr. Bhaskar Berrios.        Lab Results   Component Value Date    HGB 17.5 02/21/2020    HGB 12.9 (L) 10/28/2019    HGB 8.5 (L) 09/30/2019     Lab Results   Component Value Date    FERRITIN 45.61 10/28/2019    FERRITIN 11.41 (L) 09/30/2019    FERRITIN 6.67 (L) 09/05/2019     Lab Results   Component Value Date    .2 (H) 02/21/2020    MCV 79.2 10/28/2019    MCV 68.2 (L) 09/30/2019     Lab Results   Component Value Date    TIBC 514 10/28/2019    TIBC 514 09/30/2019    TIBC 522 09/05/2019     Lab Results   Component Value Date    LABIRON 18 (L) 10/28/2019    LABIRON 4 (L) 09/30/2019    LABIRON 2 (L) 09/05/2019     Lab Results   Component Value Date    IRON 94 10/28/2019    IRON 20 (L) 09/30/2019    IRON 13 (L) 09/05/2019       Lab Results   Component Value Date    [id number] 913 08/29/2019     No results found for: FOLATE      Assessment/Plan    1.  Iron deficiency anemia secondary to chronic blood loss from likely diverticular disease and malabsorption from Nexium use.  He is actually doing remarkably well  on oral supplementation.  He had labs drawn today.  I will review results and notify him of any significant findings.  If labs unremarkable he does not need any further follow up.  Otherwise he will follow up in 4 months with labs.        I spent a total of 15 minutes in direct patient care, greater than 10 minutes (greater than 50%) were spent in coordination of care, and counseling the patient regarding iron deficiency anemia. Answered any questions patient had with medication and plan.      SY Milan  [facility]      2/21/2020         Please note that portions of this note may have been completed with a voice recognition program. Efforts were made to edit the dictations, but occasionally words are mistranscribed.

## 2020-02-24 ENCOUNTER — DOCUMENTATION (OUTPATIENT)
Dept: ONCOLOGY | Facility: CLINIC | Age: 60
End: 2020-02-24

## 2020-02-24 NOTE — PROGRESS NOTES
Called patient and reviewed lab results.  He will follow-up with PCP regarding lipid panel.  We will follow him on an as-needed basis.  Patient verbalized understanding and is agreeable with plan.

## 2020-03-17 RX ORDER — ATORVASTATIN CALCIUM 40 MG/1
TABLET, FILM COATED ORAL
Qty: 30 TABLET | Refills: 5 | Status: SHIPPED | OUTPATIENT
Start: 2020-03-17 | End: 2020-09-18 | Stop reason: SDUPTHER

## 2020-03-27 RX ORDER — FERROUS SULFATE 325(65) MG
325 TABLET ORAL 2 TIMES DAILY
Qty: 60 TABLET | Refills: 3 | Status: SHIPPED | OUTPATIENT
Start: 2020-03-27 | End: 2020-11-23

## 2020-04-17 RX ORDER — CHLORTHALIDONE 25 MG/1
TABLET ORAL
Qty: 30 TABLET | Refills: 5 | Status: SHIPPED | OUTPATIENT
Start: 2020-04-17 | End: 2020-09-18 | Stop reason: SDUPTHER

## 2020-04-23 RX ORDER — OLMESARTAN MEDOXOMIL 20 MG/1
TABLET ORAL
Qty: 30 TABLET | Refills: 5 | Status: SHIPPED | OUTPATIENT
Start: 2020-04-23 | End: 2020-09-18 | Stop reason: SDUPTHER

## 2020-04-23 RX ORDER — BUPROPION HYDROCHLORIDE 150 MG/1
TABLET ORAL
Qty: 30 TABLET | Refills: 5 | Status: SHIPPED | OUTPATIENT
Start: 2020-04-23 | End: 2022-07-18

## 2020-05-05 RX ORDER — ERGOCALCIFEROL 1.25 MG/1
CAPSULE ORAL
Qty: 4 CAPSULE | Refills: 5 | Status: SHIPPED | OUTPATIENT
Start: 2020-05-05 | End: 2020-09-18 | Stop reason: SDUPTHER

## 2020-05-19 RX ORDER — POTASSIUM CHLORIDE 1500 MG/1
TABLET, EXTENDED RELEASE ORAL
Qty: 60 TABLET | Refills: 3 | Status: SHIPPED | OUTPATIENT
Start: 2020-05-19 | End: 2020-09-18 | Stop reason: SDUPTHER

## 2020-06-29 ENCOUNTER — TELEPHONE (OUTPATIENT)
Dept: INTERNAL MEDICINE | Facility: CLINIC | Age: 60
End: 2020-06-29

## 2020-06-30 PROCEDURE — U0003 INFECTIOUS AGENT DETECTION BY NUCLEIC ACID (DNA OR RNA); SEVERE ACUTE RESPIRATORY SYNDROME CORONAVIRUS 2 (SARS-COV-2) (CORONAVIRUS DISEASE [COVID-19]), AMPLIFIED PROBE TECHNIQUE, MAKING USE OF HIGH THROUGHPUT TECHNOLOGIES AS DESCRIBED BY CMS-2020-01-R: HCPCS | Performed by: NURSE PRACTITIONER

## 2020-07-01 NOTE — TELEPHONE ENCOUNTER
Called pt - he did go for covid test yesterday. He said he is feeling a little jittery today and still has a slight cough

## 2020-07-02 ENCOUNTER — TELEPHONE (OUTPATIENT)
Dept: URGENT CARE | Facility: CLINIC | Age: 60
End: 2020-07-02

## 2020-07-02 ENCOUNTER — TELEPHONE (OUTPATIENT)
Dept: INTERNAL MEDICINE | Facility: CLINIC | Age: 60
End: 2020-07-02

## 2020-07-02 NOTE — TELEPHONE ENCOUNTER
Called patient he verbalized understanding . States yesterday pretty bad felt  wobbly, dizzy , coughing a lot  still coughing some today and has nausea but hasn't vomited in 2 days  Feels much better today

## 2020-07-02 NOTE — TELEPHONE ENCOUNTER
Pt probably knows his covid tst was negative, but let him know and see how he is doing. It looks like [facility] thought he had viral bronchitis.

## 2020-07-02 NOTE — TELEPHONE ENCOUNTER
Spoke with patient in regards to negative COVID result. CDC Recommends patient to quarantine for 10 days from the first day of symptoms.

## 2020-07-15 ENCOUNTER — TELEPHONE (OUTPATIENT)
Dept: INTERNAL MEDICINE | Facility: CLINIC | Age: 60
End: 2020-07-15

## 2020-07-15 DIAGNOSIS — D50.0 IRON DEFICIENCY ANEMIA DUE TO CHRONIC BLOOD LOSS: Primary | ICD-10-CM

## 2020-07-15 DIAGNOSIS — E87.6 HYPOKALEMIA: ICD-10-CM

## 2020-07-16 ENCOUNTER — LAB (OUTPATIENT)
Dept: LAB | Facility: HOSPITAL | Age: 60
End: 2020-07-16

## 2020-07-16 DIAGNOSIS — D50.0 IRON DEFICIENCY ANEMIA DUE TO CHRONIC BLOOD LOSS: ICD-10-CM

## 2020-07-16 DIAGNOSIS — E87.6 HYPOKALEMIA: ICD-10-CM

## 2020-07-16 LAB
ALBUMIN SERPL-MCNC: 4.5 G/DL (ref 3.5–5.2)
ALBUMIN/GLOB SERPL: 1.7 G/DL
ALP SERPL-CCNC: 92 U/L (ref 39–117)
ALT SERPL W P-5'-P-CCNC: 49 U/L (ref 1–41)
ANION GAP SERPL CALCULATED.3IONS-SCNC: 14.2 MMOL/L (ref 5–15)
AST SERPL-CCNC: 33 U/L (ref 1–40)
BASOPHILS # BLD AUTO: 0.03 10*3/MM3 (ref 0–0.2)
BASOPHILS NFR BLD AUTO: 0.8 % (ref 0–1.5)
BILIRUB SERPL-MCNC: 1 MG/DL (ref 0–1.2)
BUN SERPL-MCNC: 15 MG/DL (ref 8–23)
BUN/CREAT SERPL: 11.5 (ref 7–25)
CALCIUM SPEC-SCNC: 9.9 MG/DL (ref 8.6–10.5)
CHLORIDE SERPL-SCNC: 96 MMOL/L (ref 98–107)
CO2 SERPL-SCNC: 25.8 MMOL/L (ref 22–29)
CREAT SERPL-MCNC: 1.3 MG/DL (ref 0.76–1.27)
DEPRECATED RDW RBC AUTO: 47.9 FL (ref 37–54)
EOSINOPHIL # BLD AUTO: 0.11 10*3/MM3 (ref 0–0.4)
EOSINOPHIL NFR BLD AUTO: 2.9 % (ref 0.3–6.2)
ERYTHROCYTE [DISTWIDTH] IN BLOOD BY AUTOMATED COUNT: 12.5 % (ref 12.3–15.4)
FERRITIN SERPL-MCNC: 170 NG/ML (ref 30–400)
GFR SERPL CREATININE-BSD FRML MDRD: 56 ML/MIN/1.73
GLOBULIN UR ELPH-MCNC: 2.7 GM/DL
GLUCOSE SERPL-MCNC: 106 MG/DL (ref 65–99)
HCT VFR BLD AUTO: 39.5 % (ref 37.5–51)
HGB BLD-MCNC: 14.3 G/DL (ref 13–17.7)
IMM GRANULOCYTES # BLD AUTO: 0.01 10*3/MM3 (ref 0–0.05)
IMM GRANULOCYTES NFR BLD AUTO: 0.3 % (ref 0–0.5)
IRON 24H UR-MRATE: 102 MCG/DL (ref 59–158)
IRON SATN MFR SERPL: 27 % (ref 20–50)
LYMPHOCYTES # BLD AUTO: 0.6 10*3/MM3 (ref 0.7–3.1)
LYMPHOCYTES NFR BLD AUTO: 16.1 % (ref 19.6–45.3)
MCH RBC QN AUTO: 37.7 PG (ref 26.6–33)
MCHC RBC AUTO-ENTMCNC: 36.2 G/DL (ref 31.5–35.7)
MCV RBC AUTO: 104.2 FL (ref 79–97)
MONOCYTES # BLD AUTO: 0.56 10*3/MM3 (ref 0.1–0.9)
MONOCYTES NFR BLD AUTO: 15 % (ref 5–12)
NEUTROPHILS NFR BLD AUTO: 2.42 10*3/MM3 (ref 1.7–7)
NEUTROPHILS NFR BLD AUTO: 64.9 % (ref 42.7–76)
NRBC BLD AUTO-RTO: 0 /100 WBC (ref 0–0.2)
PLATELET # BLD AUTO: 183 10*3/MM3 (ref 140–450)
PMV BLD AUTO: 11.5 FL (ref 6–12)
POTASSIUM SERPL-SCNC: 3.9 MMOL/L (ref 3.5–5.2)
PROT SERPL-MCNC: 7.2 G/DL (ref 6–8.5)
RBC # BLD AUTO: 3.79 10*6/MM3 (ref 4.14–5.8)
SODIUM SERPL-SCNC: 136 MMOL/L (ref 136–145)
TIBC SERPL-MCNC: 375 MCG/DL (ref 298–536)
TRANSFERRIN SERPL-MCNC: 252 MG/DL (ref 200–360)
WBC # BLD AUTO: 3.73 10*3/MM3 (ref 3.4–10.8)

## 2020-07-16 PROCEDURE — 85025 COMPLETE CBC W/AUTO DIFF WBC: CPT

## 2020-07-16 PROCEDURE — 84466 ASSAY OF TRANSFERRIN: CPT

## 2020-07-16 PROCEDURE — 80053 COMPREHEN METABOLIC PANEL: CPT

## 2020-07-16 PROCEDURE — 83540 ASSAY OF IRON: CPT

## 2020-07-16 PROCEDURE — 82728 ASSAY OF FERRITIN: CPT

## 2020-07-16 RX ORDER — METOCLOPRAMIDE 10 MG/1
TABLET ORAL
Qty: 180 TABLET | Refills: 0 | Status: SHIPPED | OUTPATIENT
Start: 2020-07-16 | End: 2020-09-18 | Stop reason: SDUPTHER

## 2020-07-20 ENCOUNTER — TELEPHONE (OUTPATIENT)
Dept: INTERNAL MEDICINE | Facility: CLINIC | Age: 60
End: 2020-07-20

## 2020-07-20 NOTE — TELEPHONE ENCOUNTER
Pt states he was given 325 mg iron from hematologist . A couple of days ago he started an  mg as well and feels better. Should he continue?

## 2020-07-20 NOTE — TELEPHONE ENCOUNTER
----- Message from Wilfred Kim MD sent at 7/20/2020  5:22 PM EDT -----  No, he can just continue the iron he is on.

## 2020-07-21 NOTE — TELEPHONE ENCOUNTER
So he is taking both?  If that is the case, he may continue taking both as the iron was down a bit from last time, although still normal.

## 2020-09-18 ENCOUNTER — OFFICE VISIT (OUTPATIENT)
Dept: INTERNAL MEDICINE | Facility: CLINIC | Age: 60
End: 2020-09-18

## 2020-09-18 VITALS
DIASTOLIC BLOOD PRESSURE: 74 MMHG | WEIGHT: 255 LBS | BODY MASS INDEX: 35.7 KG/M2 | SYSTOLIC BLOOD PRESSURE: 118 MMHG | HEIGHT: 71 IN | TEMPERATURE: 96.7 F | HEART RATE: 104 BPM

## 2020-09-18 DIAGNOSIS — Z23 NEEDS FLU SHOT: ICD-10-CM

## 2020-09-18 DIAGNOSIS — E78.2 MIXED HYPERLIPIDEMIA: ICD-10-CM

## 2020-09-18 DIAGNOSIS — M25.531 RIGHT WRIST PAIN: ICD-10-CM

## 2020-09-18 DIAGNOSIS — Z00.00 PREVENTATIVE HEALTH CARE: Primary | ICD-10-CM

## 2020-09-18 DIAGNOSIS — E55.9 VITAMIN D DEFICIENCY: ICD-10-CM

## 2020-09-18 DIAGNOSIS — E66.01 MORBID (SEVERE) OBESITY DUE TO EXCESS CALORIES (HCC): ICD-10-CM

## 2020-09-18 DIAGNOSIS — I10 ESSENTIAL HYPERTENSION: ICD-10-CM

## 2020-09-18 DIAGNOSIS — D50.0 IRON DEFICIENCY ANEMIA DUE TO CHRONIC BLOOD LOSS: ICD-10-CM

## 2020-09-18 DIAGNOSIS — Z12.5 PROSTATE CANCER SCREENING: ICD-10-CM

## 2020-09-18 PROCEDURE — 90686 IIV4 VACC NO PRSV 0.5 ML IM: CPT | Performed by: INTERNAL MEDICINE

## 2020-09-18 PROCEDURE — 90471 IMMUNIZATION ADMIN: CPT | Performed by: INTERNAL MEDICINE

## 2020-09-18 PROCEDURE — 90632 HEPA VACCINE ADULT IM: CPT | Performed by: INTERNAL MEDICINE

## 2020-09-18 PROCEDURE — 90472 IMMUNIZATION ADMIN EACH ADD: CPT | Performed by: INTERNAL MEDICINE

## 2020-09-18 PROCEDURE — 99396 PREV VISIT EST AGE 40-64: CPT | Performed by: INTERNAL MEDICINE

## 2020-09-18 RX ORDER — ERGOCALCIFEROL 1.25 MG/1
50000 CAPSULE ORAL
Qty: 4 CAPSULE | Refills: 5 | Status: SHIPPED | OUTPATIENT
Start: 2020-09-18 | End: 2021-04-08

## 2020-09-18 RX ORDER — CHLORTHALIDONE 25 MG/1
25 TABLET ORAL DAILY
Qty: 30 TABLET | Refills: 5 | Status: SHIPPED | OUTPATIENT
Start: 2020-09-18 | End: 2021-04-23

## 2020-09-18 RX ORDER — OLMESARTAN MEDOXOMIL 20 MG/1
20 TABLET ORAL DAILY
Qty: 30 TABLET | Refills: 5 | Status: SHIPPED | OUTPATIENT
Start: 2020-09-18 | End: 2021-04-23

## 2020-09-18 RX ORDER — METOCLOPRAMIDE 10 MG/1
10 TABLET ORAL 2 TIMES DAILY
Qty: 180 TABLET | Refills: 1 | Status: SHIPPED | OUTPATIENT
Start: 2020-09-18 | End: 2020-09-21

## 2020-09-18 RX ORDER — ATORVASTATIN CALCIUM 40 MG/1
40 TABLET, FILM COATED ORAL DAILY
Qty: 30 TABLET | Refills: 5 | Status: SHIPPED | OUTPATIENT
Start: 2020-09-18 | End: 2021-03-24

## 2020-09-18 RX ORDER — POTASSIUM CHLORIDE 20 MEQ/1
20 TABLET, EXTENDED RELEASE ORAL 2 TIMES DAILY
Qty: 60 TABLET | Refills: 5 | Status: SHIPPED | OUTPATIENT
Start: 2020-09-18 | End: 2020-09-21

## 2020-09-18 NOTE — PROGRESS NOTES
[facility]     Bala Staley III  1960   [id number]      Patient Care Team:  Wilfred Kim MD as PCP - General (Internal Medicine)    Chief Complaint::   Chief Complaint   Patient presents with   • Annual Exam        HPI  Mr. Staley is now 60.  He comes in for follow-up of his hypertension, hyperlipidemia, vitamin D deficiency, iron deficiency morbid obesity and for annual examination.  He continues to have problem with pain in the right wrist.  He also continues to have hip pain.  He is gained considerable weight during the COVID shutdown.  His wife is been to therapy for similar problems and he is going to do her exercises.  Overall he feels much better than he did last year and wonders if he still needs bupropion.  No fever, cough, shortness of breath or chest pain.    Chronic Conditions:      Patient Active Problem List   Diagnosis   • Essential hypertension   • Mixed hyperlipidemia   • Class 2 obesity due to excess calories without serious comorbidity in adult   • Reactive depression   • Gastroesophageal reflux disease without esophagitis   • Vitamin D deficiency   • Chronic low back pain   • Dyspnea on exertion   • Syncope, tussive   • Hypokalemia   • Cough syncope   • Allergic rhinitis   • Cough   • Hypercholesterolemia   • Iron deficiency anemia   • Vasovagal syncope   • Hernia of abdominal cavity        Past Medical History:   Diagnosis Date   • Anemia     iron deficiency   • Chronic low back pain 3/14/2018   • Colonic polyp    • ED (erectile dysfunction)    • Esophagitis    • Essential hypertension 3/14/2018   • Gastroesophageal reflux disease without esophagitis 3/14/2018   • Hernia of abdominal cavity    • Hyperlipidemia    • Reactive depression 3/14/2018   • Tennis elbow    • Vitamin D deficiency 3/14/2018       Past Surgical History:   Procedure Laterality Date   • APPENDECTOMY     • CARDIAC CATHETERIZATION  2011   • ELBOW ARTHROPLASTY      Right    • ENDOSCOPY  2011   •  ENDOSCOPY  2008    with biopsy   • ESOPHAGEAL MOTILITY STUDY N/A 6/27/2019    Procedure: MANOMETRY;  Surgeon: Mark Lowery MD;  Location: [facility] END[facility]COPY;  Service: Gastroenterology   • FOOT SURGERY      left foot        Family History   Problem Relation Age of Onset   • Alzheimer's disease Mother    • Heart disease Father    • Heart attack Father    • Colon cancer Father    • Coronary artery disease Father    • No Known Problems Sister    • Cancer Paternal Grandmother    • Lung cancer Paternal Grandmother        Social History     Socioeconomic History   • Marital status:      Spouse name: Not on file   • Number of children: Not on file   • Years of education: Not on file   • Highest education level: Not on file   Tobacco Use   • Smoking status: Never Smoker   • Smokeless tobacco: Former User     Types: Chew   Substance and Sexual Activity   • Alcohol use: Yes     Alcohol/week: 2.0 standard drinks     Types: 2 Shots of liquor per week     Comment: per day, [place]    • Drug use: No   • Sexual activity: Yes     Partners: Female       Allergies   Allergen Reactions   • Sulfa Antibiotics Unknown (See Comments)     Pt unaware of this allergy         Current Outpatient Medications:   •  aspirin 81 MG EC tablet, Take 81 mg by mouth Daily., Disp: , Rfl:   •  atorvastatin (LIPITOR) 40 MG tablet, Take 1 tablet by mouth Daily., Disp: 30 tablet, Rfl: 5  •  buPROPion XL (WELLBUTRIN XL) 150 MG 24 hr tablet, TAKE ONE TABLET BY MOUTH DAILY, Disp: 30 tablet, Rfl: 5  •  cetirizine (zyrTEC) 10 MG tablet, Take 10 mg by mouth Daily., Disp: , Rfl:   •  chlorthalidone (HYGROTON) 25 MG tablet, Take 1 tablet by mouth Daily., Disp: 30 tablet, Rfl: 5  •  Esomeprazole Magnesium (NEXIUM PO), Take  by mouth., Disp: , Rfl:   •  ferrous sulfate 325 (65 FE) MG tablet, Take 1 tablet by mouth 2 (Two) Times a Day., Disp: 60 tablet, Rfl: 3  •  magnesium oxide (MAGOX) 400 (241.3 Mg) MG tablet tablet, Take 400 mg by mouth Daily., Disp: ,  Rfl:   •  metoclopramide (REGLAN) 10 MG tablet, Take 1 tablet by mouth 2 (Two) Times a Day., Disp: 180 tablet, Rfl: 1  •  olmesartan (BENICAR) 20 MG tablet, Take 1 tablet by mouth Daily., Disp: 30 tablet, Rfl: 5  •  potassium chloride (KLOR-CON) 20 MEQ CR tablet, Take 1 tablet by mouth 2 (Two) Times a Day., Disp: 60 tablet, Rfl: 5  •  RA VITAMIN B-12 TR 1000 MCG tablet controlled-release, take 1 tablet by mouth once daily, Disp: 90 each, Rfl: 3  •  vitamin D (ERGOCALCIFEROL) 1.25 MG (82521 UT) capsule capsule, Take 1 capsule by mouth Every 7 (Seven) Days., Disp: 4 capsule, Rfl: 5    Immunization History   Administered Date(s) Administered   • Flu Vaccine Intradermal Quad 18-64YR 10/25/2018   • Flu Vaccine Quad PF >18YRS 11/25/2015   • Flu Vaccine Quad PF >36MO 10/04/2017   • Flulaval/Fluarix Quad 09/18/2020   • Hepatitis A 09/05/2019, 09/18/2020   • Influenza TIV (IM) 09/08/2009, 10/31/2014   • Influenza, Unspecified 10/25/2018   • Tdap 03/03/2011, 07/17/2014   • Zostavax 11/25/2015   • flucelvax quad pfs =>4 YRS 10/11/2019        Health Maintenance Due   Topic Date Due   • ZOSTER VACCINE (2 of 3) 01/20/2016   • HEPATITIS C SCREENING  03/14/2018   • ANNUAL PHYSICAL  08/17/2020        Review of Systems   Constitutional: Negative for chills, fatigue and fever.   HENT: Negative for congestion, ear pain and sinus pressure.    Respiratory: Positive for cough. Negative for chest tightness, shortness of breath and wheezing.    Cardiovascular: Negative for chest pain and palpitations.   Gastrointestinal: Negative for abdominal pain, blood in stool and constipation.   Skin: Negative for color change.   Allergic/Immunologic: Negative for environmental allergies.   Neurological: Negative for dizziness, speech difficulty and headache.   Psychiatric/Behavioral: Negative for decreased concentration. The patient is not nervous/anxious.         Vital Signs  Vitals:    09/18/20 1356   BP: 118/74   BP Location: Left arm   Patient  "Position: Sitting   Cuff Size: Adult   Pulse: 104   Temp: 96.7 °F (35.9 °C)   Weight: 116 kg (255 lb)   Height: 180 cm (70.87\")   PainSc:   7   PainLoc: Hip  Comment: Also right wrist       Physical Exam  Vitals signs and nursing note reviewed.   Constitutional:       Appearance: He is well-developed. He is obese.   HENT:      Head: Normocephalic and atraumatic.      Right Ear: External ear normal.      Left Ear: External ear normal.      Nose: Nose normal.      Mouth/Throat:      Pharynx: No oropharyngeal exudate.   Eyes:      Conjunctiva/sclera: Conjunctivae normal.      Pupils: Pupils are equal, round, and reactive to light.   Neck:      Musculoskeletal: Normal range of motion and neck supple.      Thyroid: No thyromegaly.      Vascular: No JVD.   Cardiovascular:      Rate and Rhythm: Normal rate and regular rhythm.      Heart sounds: Normal heart sounds. No murmur. No friction rub. No gallop.    Pulmonary:      Effort: Pulmonary effort is normal. No respiratory distress.      Breath sounds: Normal breath sounds. No wheezing or rales.   Chest:      Chest wall: No tenderness.   Abdominal:      General: Bowel sounds are normal. There is no distension.      Palpations: Abdomen is soft. There is no mass.      Tenderness: There is no abdominal tenderness. There is no guarding or rebound.      Hernia: No hernia is present.   Musculoskeletal: Normal range of motion.         General: No tenderness.   Lymphadenopathy:      Cervical: No cervical adenopathy.   Skin:     General: Skin is warm and dry.      Findings: No erythema or rash.   Neurological:      Mental Status: He is alert and oriented to person, place, and time.      Cranial Nerves: No cranial nerve deficit.      Sensory: No sensory deficit.      Motor: No abnormal muscle tone.      Coordination: Coordination normal.      Deep Tendon Reflexes: Reflexes normal.   Psychiatric:         Behavior: Behavior normal.         Thought Content: Thought content normal.       "   Judgment: Judgment normal.        Procedures     Fall Risk Screen:  MARÍA Fall Risk Assessment has not been completed.    Health Habits and Functional and Cognitive Screening:  No flowsheet data found.    Depression [place]  PHQ-9 Total Score: 0     ACE III MINI             Assessment/Plan:    Bala was seen today for annual exam.    Diagnoses and all orders for this visit:    Preventative health care    Right wrist pain  -     Ambulatory Referral to Hand Surgery    Essential hypertension  -     Comprehensive Metabolic Panel; Future  -     Microalbumin / Creatinine Urine Ratio - Urine, Clean Catch; Future    Mixed hyperlipidemia  -     Lipid Panel; Future    Vitamin D deficiency  -     Vitamin D 25 Hydroxy; Future    Prostate cancer screening  -     PSA Screen; Future    Needs flu shot  -     Fluarix/Fluzone/Afluria Quad>6 Months    Iron deficiency anemia due to chronic blood loss    Morbid (severe) obesity due to excess calories (CMS/HCC)    Other orders  -     atorvastatin (LIPITOR) 40 MG tablet; Take 1 tablet by mouth Daily.  -     chlorthalidone (HYGROTON) 25 MG tablet; Take 1 tablet by mouth Daily.  -     potassium chloride (KLOR-CON) 20 MEQ CR tablet; Take 1 tablet by mouth 2 (Two) Times a Day.  -     metoclopramide (REGLAN) 10 MG tablet; Take 1 tablet by mouth 2 (Two) Times a Day.  -     olmesartan (BENICAR) 20 MG tablet; Take 1 tablet by mouth Daily.  -     vitamin D (ERGOCALCIFEROL) 1.25 MG (66321 UT) capsule capsule; Take 1 capsule by mouth Every 7 (Seven) Days.  -     Cancel: Fluad Quad >65 years  -     Hepatitis A Vaccine Adult IM    Plan    Overall he remains in good health but needs to lose weight and improve lifestyle habits.  Colonoscopy was performed in 2011, due at 10 years.  He may reduce his bupropion to every other day for 3 to 4 weeks.  If stable at that point then every 3 days for 2 to 3 weeks then he may discontinue.    He is referred to Dr. Frazier for his wrist pain.    Blood  pressure is well controlled, continue olmesartan and chlorthalidone.    Lipid panel is pending, continue healthy diet, weight loss and atorvastatin.    Vitamin D level is pending.  Continue high-dose vitamin D supplementation    He will continue follow-up with hematology for iron deficiency anemia.    BMI is 35, he is encouraged to improve his diet and fitness to lose weight.          Labs  Results for orders placed or performed in visit on 07/16/20   Comprehensive Metabolic Panel    Specimen: Blood   Result Value Ref Range    Glucose 106 (H) 65 - 99 mg/dL    BUN 15 8 - 23 mg/dL    Creatinine 1.30 (H) 0.76 - 1.27 mg/dL    Sodium 136 136 - 145 mmol/L    Potassium 3.9 3.5 - 5.2 mmol/L    Chloride 96 (L) 98 - 107 mmol/L    CO2 25.8 22.0 - 29.0 mmol/L    Calcium 9.9 8.6 - 10.5 mg/dL    Total Protein 7.2 6.0 - 8.5 g/dL    Albumin 4.50 3.50 - 5.20 g/dL    ALT (SGPT) 49 (H) 1 - 41 U/L    AST (SGOT) 33 1 - 40 U/L    Alkaline Phosphatase 92 39 - 117 U/L    Total Bilirubin 1.0 0.0 - 1.2 mg/dL    eGFR Non African Amer 56 (L) >60 mL/min/1.73    Globulin 2.7 gm/dL    A/G Ratio 1.7 g/dL    BUN/Creatinine Ratio 11.5 7.0 - 25.0    Anion Gap 14.2 5.0 - 15.0 mmol/L   Ferritin    Specimen: Blood   Result Value Ref Range    Ferritin 170.00 30.00 - 400.00 ng/mL   Iron Profile    Specimen: Blood   Result Value Ref Range    Iron 102 59 - 158 mcg/dL    Iron Saturation 27 20 - 50 %    Transferrin 252 200 - 360 mg/dL    TIBC 375 298 - 536 mcg/dL   CBC Auto Differential    Specimen: Blood   Result Value Ref Range    WBC 3.73 3.40 - 10.80 10*3/mm3    RBC 3.79 (L) 4.14 - 5.80 10*6/mm3    Hemoglobin 14.3 13.0 - 17.7 g/dL    Hematocrit 39.5 37.5 - 51.0 %    .2 (H) 79.0 - 97.0 fL    MCH 37.7 (H) 26.6 - 33.0 pg    MCHC 36.2 (H) 31.5 - 35.7 g/dL    RDW 12.5 12.3 - 15.4 %    RDW-SD 47.9 37.0 - 54.0 fl    MPV 11.5 6.0 - 12.0 fL    Platelets 183 140 - 450 10*3/mm3    Neutrophil % 64.9 42.7 - 76.0 %    Lymphocyte % 16.1 (L) 19.6 - 45.3 %     Monocyte % 15.0 (H) 5.0 - 12.0 %    Eosinophil % 2.9 0.3 - 6.2 %    Basophil % 0.8 0.0 - 1.5 %    Immature Grans % 0.3 0.0 - 0.5 %    Neutrophils, Absolute 2.42 1.70 - 7.00 10*3/mm3    Lymphocytes, Absolute 0.60 (L) 0.70 - 3.10 10*3/mm3    Monocytes, Absolute 0.56 0.10 - 0.90 10*3/mm3    Eosinophils, Absolute 0.11 0.00 - 0.40 10*3/mm3    Basophils, Absolute 0.03 0.00 - 0.20 10*3/mm3    Immature Grans, Absolute 0.01 0.00 - 0.05 10*3/mm3    nRBC 0.0 0.0 - 0.2 /100 WBC        Counseling was given to patient for the following topics: appropriate exercise 150 minutes/week, disease prevention and healthy eating habits.    Plan of care reviewed with patient at the conclusion of today's visit. Education was provided regarding diagnosis, management, and any prescribed or recommended OTC medications.Patient verbalizes understanding of and agreement with management plan.         Wilfred Kim MD

## 2020-09-21 RX ORDER — POTASSIUM CHLORIDE 1500 MG/1
TABLET, EXTENDED RELEASE ORAL
Qty: 60 TABLET | Refills: 5 | Status: SHIPPED | OUTPATIENT
Start: 2020-09-21 | End: 2021-09-24

## 2020-09-21 RX ORDER — METOCLOPRAMIDE 10 MG/1
TABLET ORAL
Qty: 60 TABLET | Refills: 5 | Status: SHIPPED | OUTPATIENT
Start: 2020-09-21 | End: 2021-10-26

## 2020-11-23 RX ORDER — FERROUS SULFATE 325(65) MG
TABLET ORAL
Qty: 60 TABLET | Refills: 3 | Status: SHIPPED | OUTPATIENT
Start: 2020-11-23 | End: 2021-04-28 | Stop reason: SDUPTHER

## 2021-03-24 RX ORDER — ATORVASTATIN CALCIUM 40 MG/1
TABLET, FILM COATED ORAL
Qty: 30 TABLET | Refills: 4 | Status: SHIPPED | OUTPATIENT
Start: 2021-03-24 | End: 2021-08-23

## 2021-03-25 ENCOUNTER — TELEPHONE (OUTPATIENT)
Dept: INTERNAL MEDICINE | Facility: CLINIC | Age: 61
End: 2021-03-25

## 2021-04-08 RX ORDER — ERGOCALCIFEROL 1.25 MG/1
CAPSULE ORAL
Qty: 4 CAPSULE | Refills: 4 | Status: SHIPPED | OUTPATIENT
Start: 2021-04-08 | End: 2021-09-01

## 2021-04-23 RX ORDER — CHLORTHALIDONE 25 MG/1
TABLET ORAL
Qty: 30 TABLET | Refills: 5 | Status: SHIPPED | OUTPATIENT
Start: 2021-04-23 | End: 2021-11-01

## 2021-04-23 RX ORDER — OLMESARTAN MEDOXOMIL 20 MG/1
TABLET ORAL
Qty: 30 TABLET | Refills: 5 | Status: SHIPPED | OUTPATIENT
Start: 2021-04-23 | End: 2022-03-07

## 2021-04-28 RX ORDER — FERROUS SULFATE 325(65) MG
TABLET ORAL
Qty: 60 TABLET | Refills: 0 | OUTPATIENT
Start: 2021-04-28

## 2021-04-28 RX ORDER — FERROUS SULFATE 325(65) MG
1 TABLET ORAL 2 TIMES DAILY
Qty: 60 TABLET | Refills: 3 | Status: SHIPPED | OUTPATIENT
Start: 2021-04-28 | End: 2021-09-01

## 2021-06-22 ENCOUNTER — TELEPHONE (OUTPATIENT)
Dept: INTERNAL MEDICINE | Facility: CLINIC | Age: 61
End: 2021-06-22

## 2021-06-22 DIAGNOSIS — I10 ESSENTIAL HYPERTENSION: Primary | ICD-10-CM

## 2021-06-22 DIAGNOSIS — E55.9 VITAMIN D DEFICIENCY: ICD-10-CM

## 2021-06-22 DIAGNOSIS — D50.0 IRON DEFICIENCY ANEMIA DUE TO CHRONIC BLOOD LOSS: ICD-10-CM

## 2021-06-22 DIAGNOSIS — Z12.5 PROSTATE CANCER SCREENING: ICD-10-CM

## 2021-06-22 DIAGNOSIS — R73.09 ABNORMAL GLUCOSE: ICD-10-CM

## 2021-06-22 DIAGNOSIS — E78.2 MIXED HYPERLIPIDEMIA: ICD-10-CM

## 2021-06-22 DIAGNOSIS — Z11.59 ENCOUNTER FOR HEPATITIS C SCREENING TEST FOR LOW RISK PATIENT: ICD-10-CM

## 2021-06-22 NOTE — TELEPHONE ENCOUNTER
Caller: Bala Staley III    Relationship: Self    Best call back number: [phone]    What orders are you requesting (i.e. lab or imaging): LAB WORK    In what timeframe would the patient need to come in: BEFORE APPOINTMENT    Where will you receive your lab/imaging services:N/A  Additional notes: PATIENT WANTED TO KNOW IF  WANTED HIM TO COME GET HIS BLOOD WORK DONE BEFORE HIS APPOINTMENT ON 07-01-21    PLEASE ADVISE

## 2021-06-24 ENCOUNTER — TELEPHONE (OUTPATIENT)
Dept: INTERNAL MEDICINE | Facility: CLINIC | Age: 61
End: 2021-06-24

## 2021-06-24 NOTE — TELEPHONE ENCOUNTER
Caller: Bala Staley III    Relationship: Self    Best call back number: [phone]     What is the best time to reach you: ANYTIME    Who are you requesting to speak with (clinical staff, provider,  specific staff member): PROVIDER    Do you know the name of the person who called: SELF    What was the call regarding: PATIENT STATES THAT HIS BLOOD PRESSURE HAS DROPPED PATIENT STATES THAT HE DOES NOT FEEL GOOD AND WOULD LIKE TO RECEIVE A CALL.    Do you require a callback: YES

## 2021-06-24 NOTE — TELEPHONE ENCOUNTER
"Pt states B/P this afternoon was 70/50 and 80/40. He said he has been light-headed and feels \"off\". He takes chlorthalidone 25mg and Benicar 20mg. Per Dr. Kim hold B/P meds and come in tomorrow. Sched appt for 8:15 in am. Pt advised if symptoms worsen, any CP or shortness of breath - go to ER. He voices understanding  "

## 2021-06-25 ENCOUNTER — OFFICE VISIT (OUTPATIENT)
Dept: INTERNAL MEDICINE | Facility: CLINIC | Age: 61
End: 2021-06-25

## 2021-06-25 ENCOUNTER — LAB (OUTPATIENT)
Dept: LAB | Facility: HOSPITAL | Age: 61
End: 2021-06-25

## 2021-06-25 VITALS
SYSTOLIC BLOOD PRESSURE: 122 MMHG | TEMPERATURE: 98.4 F | HEIGHT: 71 IN | HEART RATE: 76 BPM | DIASTOLIC BLOOD PRESSURE: 64 MMHG | BODY MASS INDEX: 36.06 KG/M2 | WEIGHT: 257.6 LBS

## 2021-06-25 DIAGNOSIS — I10 ESSENTIAL HYPERTENSION: ICD-10-CM

## 2021-06-25 DIAGNOSIS — E78.2 MIXED HYPERLIPIDEMIA: ICD-10-CM

## 2021-06-25 DIAGNOSIS — R73.09 ABNORMAL GLUCOSE: ICD-10-CM

## 2021-06-25 DIAGNOSIS — D50.0 IRON DEFICIENCY ANEMIA DUE TO CHRONIC BLOOD LOSS: ICD-10-CM

## 2021-06-25 DIAGNOSIS — I95.0 IDIOPATHIC HYPOTENSION: Primary | ICD-10-CM

## 2021-06-25 DIAGNOSIS — Z12.5 PROSTATE CANCER SCREENING: ICD-10-CM

## 2021-06-25 DIAGNOSIS — E55.9 VITAMIN D DEFICIENCY: ICD-10-CM

## 2021-06-25 DIAGNOSIS — Z11.59 ENCOUNTER FOR HEPATITIS C SCREENING TEST FOR LOW RISK PATIENT: ICD-10-CM

## 2021-06-25 LAB
25(OH)D3 SERPL-MCNC: 49.1 NG/ML (ref 30–100)
ALBUMIN SERPL-MCNC: 4.6 G/DL (ref 3.5–5.2)
ALBUMIN UR-MCNC: 1.7 MG/DL
ALBUMIN/GLOB SERPL: 1.8 G/DL
ALP SERPL-CCNC: 102 U/L (ref 39–117)
ALT SERPL W P-5'-P-CCNC: 85 U/L (ref 1–41)
ANION GAP SERPL CALCULATED.3IONS-SCNC: 12.9 MMOL/L (ref 5–15)
AST SERPL-CCNC: 66 U/L (ref 1–40)
BASOPHILS # BLD AUTO: 0.02 10*3/MM3 (ref 0–0.2)
BASOPHILS NFR BLD AUTO: 0.5 % (ref 0–1.5)
BILIRUB SERPL-MCNC: 1.3 MG/DL (ref 0–1.2)
BUN SERPL-MCNC: 19 MG/DL (ref 8–23)
BUN/CREAT SERPL: 14.6 (ref 7–25)
CALCIUM SPEC-SCNC: 9.5 MG/DL (ref 8.6–10.5)
CHLORIDE SERPL-SCNC: 94 MMOL/L (ref 98–107)
CHOLEST SERPL-MCNC: 168 MG/DL (ref 0–200)
CO2 SERPL-SCNC: 25.1 MMOL/L (ref 22–29)
CREAT SERPL-MCNC: 1.3 MG/DL (ref 0.76–1.27)
CREAT UR-MCNC: 76.2 MG/DL
DEPRECATED RDW RBC AUTO: 50.8 FL (ref 37–54)
EOSINOPHIL # BLD AUTO: 0.18 10*3/MM3 (ref 0–0.4)
EOSINOPHIL NFR BLD AUTO: 4.7 % (ref 0.3–6.2)
ERYTHROCYTE [DISTWIDTH] IN BLOOD BY AUTOMATED COUNT: 13.4 % (ref 12.3–15.4)
FERRITIN SERPL-MCNC: 776 NG/ML (ref 30–400)
GFR SERPL CREATININE-BSD FRML MDRD: 56 ML/MIN/1.73
GLOBULIN UR ELPH-MCNC: 2.6 GM/DL
GLUCOSE SERPL-MCNC: 94 MG/DL (ref 65–99)
HBA1C MFR BLD: 5.01 % (ref 4.8–5.6)
HCT VFR BLD AUTO: 41.8 % (ref 37.5–51)
HCV AB SER DONR QL: NORMAL
HDLC SERPL-MCNC: 42 MG/DL (ref 40–60)
HGB BLD-MCNC: 15.1 G/DL (ref 13–17.7)
IMM GRANULOCYTES # BLD AUTO: 0.02 10*3/MM3 (ref 0–0.05)
IMM GRANULOCYTES NFR BLD AUTO: 0.5 % (ref 0–0.5)
IRON 24H UR-MRATE: 108 MCG/DL (ref 59–158)
IRON SATN MFR SERPL: 31 % (ref 20–50)
LDLC SERPL CALC-MCNC: 107 MG/DL (ref 0–100)
LDLC/HDLC SERPL: 2.5 {RATIO}
LYMPHOCYTES # BLD AUTO: 0.96 10*3/MM3 (ref 0.7–3.1)
LYMPHOCYTES NFR BLD AUTO: 25.3 % (ref 19.6–45.3)
MCH RBC QN AUTO: 37.3 PG (ref 26.6–33)
MCHC RBC AUTO-ENTMCNC: 36.1 G/DL (ref 31.5–35.7)
MCV RBC AUTO: 103.2 FL (ref 79–97)
MICROALBUMIN/CREAT UR: 22.3 MG/G
MONOCYTES # BLD AUTO: 0.67 10*3/MM3 (ref 0.1–0.9)
MONOCYTES NFR BLD AUTO: 17.6 % (ref 5–12)
NEUTROPHILS NFR BLD AUTO: 1.95 10*3/MM3 (ref 1.7–7)
NEUTROPHILS NFR BLD AUTO: 51.4 % (ref 42.7–76)
NRBC BLD AUTO-RTO: 0 /100 WBC (ref 0–0.2)
PLATELET # BLD AUTO: 168 10*3/MM3 (ref 140–450)
PMV BLD AUTO: 10.5 FL (ref 6–12)
POTASSIUM SERPL-SCNC: 4.3 MMOL/L (ref 3.5–5.2)
PROT SERPL-MCNC: 7.2 G/DL (ref 6–8.5)
PSA SERPL-MCNC: 2.11 NG/ML (ref 0–4)
RBC # BLD AUTO: 4.05 10*6/MM3 (ref 4.14–5.8)
SODIUM SERPL-SCNC: 132 MMOL/L (ref 136–145)
TIBC SERPL-MCNC: 350 MCG/DL (ref 298–536)
TRANSFERRIN SERPL-MCNC: 235 MG/DL (ref 200–360)
TRIGL SERPL-MCNC: 104 MG/DL (ref 0–150)
VLDLC SERPL-MCNC: 19 MG/DL (ref 5–40)
WBC # BLD AUTO: 3.8 10*3/MM3 (ref 3.4–10.8)

## 2021-06-25 PROCEDURE — 86803 HEPATITIS C AB TEST: CPT

## 2021-06-25 PROCEDURE — 93000 ELECTROCARDIOGRAM COMPLETE: CPT | Performed by: INTERNAL MEDICINE

## 2021-06-25 PROCEDURE — 84466 ASSAY OF TRANSFERRIN: CPT

## 2021-06-25 PROCEDURE — 83036 HEMOGLOBIN GLYCOSYLATED A1C: CPT

## 2021-06-25 PROCEDURE — 99213 OFFICE O/P EST LOW 20 MIN: CPT | Performed by: INTERNAL MEDICINE

## 2021-06-25 PROCEDURE — 80053 COMPREHEN METABOLIC PANEL: CPT

## 2021-06-25 PROCEDURE — 82043 UR ALBUMIN QUANTITATIVE: CPT

## 2021-06-25 PROCEDURE — 82728 ASSAY OF FERRITIN: CPT

## 2021-06-25 PROCEDURE — 85025 COMPLETE CBC W/AUTO DIFF WBC: CPT

## 2021-06-25 PROCEDURE — 82570 ASSAY OF URINE CREATININE: CPT

## 2021-06-25 PROCEDURE — 82306 VITAMIN D 25 HYDROXY: CPT

## 2021-06-25 PROCEDURE — G0103 PSA SCREENING: HCPCS

## 2021-06-25 PROCEDURE — 80061 LIPID PANEL: CPT

## 2021-06-25 PROCEDURE — 36415 COLL VENOUS BLD VENIPUNCTURE: CPT

## 2021-06-25 PROCEDURE — 83540 ASSAY OF IRON: CPT

## 2021-06-25 NOTE — PROGRESS NOTES
"[facility]     Bala Staley III  1960   [id number]      Patient Care Team:  Wilfred Kim MD as PCP - General (Internal Medicine)    Chief Complaint::   Chief Complaint   Patient presents with   • Hypotension        HPI  Mr. Staley comes in today for follow-up of an episode of hypotension yesterday.  He and his wife had been in swimming pool.  He was in the shade and the temperature was not over 80 degrees.  However he felt lightheaded, weak and felt \"off.\"  He took his blood pressure when he got home and it was 70/50 and then 80/40.  He was advised at the time to not take his blood pressure medication and come in today.  He states that last night by the time he went to bed he felt better and this morning he feels back to normal.  At no time did he have palpitations, shortness of breath or chest pain.    Chronic Conditions:      Patient Active Problem List   Diagnosis   • Essential hypertension   • Mixed hyperlipidemia   • Class 2 obesity due to excess calories without serious comorbidity in adult   • Reactive depression   • Gastroesophageal reflux disease without esophagitis   • Vitamin D deficiency   • Chronic low back pain   • Dyspnea on exertion   • Syncope, tussive   • Hypokalemia   • Cough syncope   • Allergic rhinitis   • Cough   • Hypercholesterolemia   • Iron deficiency anemia   • Vasovagal syncope   • Hernia of abdominal cavity        Past Medical History:   Diagnosis Date   • Anemia     iron deficiency   • Chronic low back pain 3/14/2018   • Colonic polyp    • ED (erectile dysfunction)    • Esophagitis    • Essential hypertension 3/14/2018   • Gastroesophageal reflux disease without esophagitis 3/14/2018   • Hernia of abdominal cavity    • Hyperlipidemia    • Reactive depression 3/14/2018   • Tennis elbow    • Vitamin D deficiency 3/14/2018       Past Surgical History:   Procedure Laterality Date   • APPENDECTOMY     • CARDIAC CATHETERIZATION  2011   • ELBOW ARTHROPLASTY      " Right    • ENDOSCOPY  2011   • ENDOSCOPY  2008    with biopsy   • ESOPHAGEAL MOTILITY STUDY N/A 6/27/2019    Procedure: MANOMETRY;  Surgeon: Mark Lowery MD;  Location: [facility] ENDOSCOPY;  Service: Gastroenterology   • FOOT SURGERY      left foot        Family History   Problem Relation Age of Onset   • Alzheimer's disease Mother    • Heart disease Father    • Heart attack Father    • Colon cancer Father    • Coronary artery disease Father    • No Known Problems Sister    • Cancer Paternal Grandmother    • Lung cancer Paternal Grandmother        Social History     Socioeconomic History   • Marital status:      Spouse name: Not on file   • Number of children: Not on file   • Years of education: Not on file   • Highest education level: Not on file   Tobacco Use   • Smoking status: Never Smoker   • Smokeless tobacco: Former User     Types: Chew   Substance and Sexual Activity   • Alcohol use: Yes     Alcohol/week: 2.0 standard drinks     Types: 2 Shots of liquor per week     Comment: per day, [place]    • Drug use: No   • Sexual activity: Yes     Partners: Female       Allergies   Allergen Reactions   • Sulfa Antibiotics Unknown (See Comments)     Pt unaware of this allergy         Current Outpatient Medications:   •  aspirin 81 MG EC tablet, Take 81 mg by mouth Daily., Disp: , Rfl:   •  atorvastatin (LIPITOR) 40 MG tablet, TAKE ONE TABLET BY MOUTH DAILY, Disp: 30 tablet, Rfl: 4  •  buPROPion XL (WELLBUTRIN XL) 150 MG 24 hr tablet, TAKE ONE TABLET BY MOUTH DAILY, Disp: 30 tablet, Rfl: 5  •  cetirizine (zyrTEC) 10 MG tablet, Take 10 mg by mouth Daily., Disp: , Rfl:   •  chlorthalidone (HYGROTON) 25 MG tablet, TAKE ONE TABLET BY MOUTH DAILY, Disp: 30 tablet, Rfl: 5  •  Esomeprazole Magnesium (NEXIUM PO), Take  by mouth., Disp: , Rfl:   •  ferrous sulfate 325 (65 FE) MG tablet, Take 1 tablet by mouth 2 (Two) Times a Day., Disp: 60 tablet, Rfl: 3  •  KLOR-CON 20 MEQ CR tablet, TAKE ONE TABLET BY MOUTH TWICE A  "DAY, Disp: 60 tablet, Rfl: 5  •  magnesium oxide (MAGOX) 400 (241.3 Mg) MG tablet tablet, Take 400 mg by mouth Daily., Disp: , Rfl:   •  metoclopramide (REGLAN) 10 MG tablet, TAKE ONE TABLET BY MOUTH TWICE A DAY, Disp: 60 tablet, Rfl: 5  •  olmesartan (BENICAR) 20 MG tablet, TAKE ONE TABLET BY MOUTH DAILY, Disp: 30 tablet, Rfl: 5  •  RA VITAMIN B-12 TR 1000 MCG tablet controlled-release, take 1 tablet by mouth once daily, Disp: 90 each, Rfl: 3  •  vitamin D (ERGOCALCIFEROL) 1.25 MG (14022 UT) capsule capsule, TAKE ONE CAPSULE BY MOUTH ONCE AT THE END OF EVERY WEEK, Disp: 4 capsule, Rfl: 4    Review of Systems   Constitutional: Positive for fatigue.   Respiratory: Negative.  Negative for chest tightness and shortness of breath.    Cardiovascular: Negative.  Negative for chest pain.   Gastrointestinal: Negative for abdominal pain, blood in stool, constipation and diarrhea.   Neurological: Positive for light-headedness.        Vital Signs  Vitals:    06/25/21 0814   BP: 122/64   BP Location: Left arm   Patient Position: Sitting   Cuff Size: Large Adult   Pulse: 76   Temp: 98.4 °F (36.9 °C)   Weight: 117 kg (257 lb 9.6 oz)   Height: 180 cm (70.87\")   PainSc:   5   PainLoc: Generalized  Comment: kathleen legs       Physical Exam  Vitals reviewed.   Constitutional:       Appearance: He is well-developed.   HENT:      Head: Normocephalic and atraumatic.   Cardiovascular:      Rate and Rhythm: Normal rate and regular rhythm.      Heart sounds: Normal heart sounds. No murmur heard.     Pulmonary:      Effort: Pulmonary effort is normal.      Breath sounds: Normal breath sounds.   Neurological:      Mental Status: He is alert and oriented to person, place, and time.            ECG 12 Lead    Date/Time: 6/25/2021 8:51 AM  Performed by: Wilfred Kim MD  Authorized by: Wilfred Kim MD   Comparison: compared with previous ECG from 11/13/2018  Similar to previous ECG  Rhythm: sinus rhythm  Rate: normal  BPM: " 73  Conduction: conduction normal  ST Segments: ST segments normal  T Waves: T waves normal  QRS axis: normal    Clinical impression: normal ECG            ACE III MINI             Assessment/Plan:    Diagnoses and all orders for this visit:    1. Idiopathic hypotension (Primary)    2. Essential hypertension    Plan    Episode of low blood pressure, possibly secondary to insensible fluid losses from heat, now apparently resolved.  ECG today is normal.  He will have his routine lab work today including electrolytes and CBC.  He will monitor his blood pressure daily, once a systolic reading is 1 30-1 40 he will resume olmesartan.  He will continue to hold chlorthalidone unless it stays that high or higher.  He has a follow-up appointment next week.      Plan of care reviewed with patient at the conclusion of today's visit. Education was provided regarding diagnosis, management, and any prescribed or recommended OTC medications.Patient verbalizes understanding of and agreement with management plan.         Wilfred Kim MD

## 2021-07-01 ENCOUNTER — OFFICE VISIT (OUTPATIENT)
Dept: INTERNAL MEDICINE | Facility: CLINIC | Age: 61
End: 2021-07-01

## 2021-07-01 VITALS
BODY MASS INDEX: 36.15 KG/M2 | WEIGHT: 258.2 LBS | SYSTOLIC BLOOD PRESSURE: 116 MMHG | DIASTOLIC BLOOD PRESSURE: 72 MMHG | HEART RATE: 76 BPM | TEMPERATURE: 97.8 F | HEIGHT: 71 IN

## 2021-07-01 DIAGNOSIS — R20.2 PARESTHESIAS: ICD-10-CM

## 2021-07-01 DIAGNOSIS — I10 ESSENTIAL HYPERTENSION: Primary | ICD-10-CM

## 2021-07-01 DIAGNOSIS — E66.09 CLASS 2 OBESITY DUE TO EXCESS CALORIES WITHOUT SERIOUS COMORBIDITY WITH BODY MASS INDEX (BMI) OF 36.0 TO 36.9 IN ADULT: ICD-10-CM

## 2021-07-01 DIAGNOSIS — F32.9 REACTIVE DEPRESSION: ICD-10-CM

## 2021-07-01 DIAGNOSIS — K21.9 GASTROESOPHAGEAL REFLUX DISEASE WITHOUT ESOPHAGITIS: ICD-10-CM

## 2021-07-01 DIAGNOSIS — E78.2 MIXED HYPERLIPIDEMIA: ICD-10-CM

## 2021-07-01 PROCEDURE — 99214 OFFICE O/P EST MOD 30 MIN: CPT | Performed by: INTERNAL MEDICINE

## 2021-07-01 NOTE — PROGRESS NOTES
[facility]     Bala Staley III  1960   [id number]      Patient Care Team:  Wilfred Kim MD as PCP - General (Internal Medicine)    Chief Complaint::   Chief Complaint   Patient presents with   • Hypotension     follow up   • Hyperlipidemia        HPI  Mr. Staley comes in for follow-up of his hypertension, hyperlipidemia, obesity, GERD, depression.  Last week he had an episode of hypotension after returning home from the swimming pool.  He was instructed to hold the chlorthalidone.  His blood pressure has been reasonably normal and he has had no further drop since then.  However he does complain of increasing ankle edema.  He also complains of leg pain.  This occurs when he is on his feet and walking and goes away with rest.  The character is that of pins-and-needles from the mid thigh down.  He also has bilateral hip pain.  These 2 things prevent him from walking and exercising.    Chronic Conditions:      Patient Active Problem List   Diagnosis   • Essential hypertension   • Mixed hyperlipidemia   • Class 2 obesity due to excess calories without serious comorbidity in adult   • Reactive depression   • Gastroesophageal reflux disease without esophagitis   • Vitamin D deficiency   • Chronic low back pain   • Dyspnea on exertion   • Syncope, tussive   • Hypokalemia   • Cough syncope   • Allergic rhinitis   • Cough   • Hypercholesterolemia   • Iron deficiency anemia   • Vasovagal syncope   • Hernia of abdominal cavity        Past Medical History:   Diagnosis Date   • Anemia     iron deficiency   • Chronic low back pain 3/14/2018   • Colonic polyp    • ED (erectile dysfunction)    • Esophagitis    • Essential hypertension 3/14/2018   • Gastroesophageal reflux disease without esophagitis 3/14/2018   • Hernia of abdominal cavity    • Hyperlipidemia    • Reactive depression 3/14/2018   • Tennis elbow    • Vitamin D deficiency 3/14/2018       Past Surgical History:   Procedure Laterality Date    • APPENDECTOMY     • CARDIAC CATHETERIZATION  2011   • ELBOW ARTHROPLASTY      Right    • ENDOSCOPY  2011   • ENDOSCOPY  2008    with biopsy   • ESOPHAGEAL MOTILITY STUDY N/A 6/27/2019    Procedure: MANOMETRY;  Surgeon: Mark Lowery MD;  Location: [facility] ENDOSCOPY;  Service: Gastroenterology   • FOOT SURGERY      left foot        Family History   Problem Relation Age of Onset   • Alzheimer's disease Mother    • Heart disease Father    • Heart attack Father    • Colon cancer Father    • Coronary artery disease Father    • No Known Problems Sister    • Cancer Paternal Grandmother    • Lung cancer Paternal Grandmother        Social History     Socioeconomic History   • Marital status:      Spouse name: Not on file   • Number of children: Not on file   • Years of education: Not on file   • Highest education level: Not on file   Tobacco Use   • Smoking status: Never Smoker   • Smokeless tobacco: Former User     Types: Chew   Substance and Sexual Activity   • Alcohol use: Yes     Alcohol/week: 2.0 standard drinks     Types: 2 Shots of liquor per week     Comment: per day, [place]    • Drug use: No   • Sexual activity: Yes     Partners: Female       Allergies   Allergen Reactions   • Sulfa Antibiotics Unknown (See Comments)     Pt unaware of this allergy         Current Outpatient Medications:   •  aspirin 81 MG EC tablet, Take 81 mg by mouth Daily., Disp: , Rfl:   •  atorvastatin (LIPITOR) 40 MG tablet, TAKE ONE TABLET BY MOUTH DAILY, Disp: 30 tablet, Rfl: 4  •  buPROPion XL (WELLBUTRIN XL) 150 MG 24 hr tablet, TAKE ONE TABLET BY MOUTH DAILY, Disp: 30 tablet, Rfl: 5  •  cetirizine (zyrTEC) 10 MG tablet, Take 10 mg by mouth Daily., Disp: , Rfl:   •  Esomeprazole Magnesium (NEXIUM PO), Take  by mouth., Disp: , Rfl:   •  ferrous sulfate 325 (65 FE) MG tablet, Take 1 tablet by mouth 2 (Two) Times a Day., Disp: 60 tablet, Rfl: 3  •  KLOR-CON 20 MEQ CR tablet, TAKE ONE TABLET BY MOUTH TWICE A DAY, Disp: 60  "tablet, Rfl: 5  •  magnesium oxide (MAGOX) 400 (241.3 Mg) MG tablet tablet, Take 400 mg by mouth Daily., Disp: , Rfl:   •  metoclopramide (REGLAN) 10 MG tablet, TAKE ONE TABLET BY MOUTH TWICE A DAY, Disp: 60 tablet, Rfl: 5  •  olmesartan (BENICAR) 20 MG tablet, TAKE ONE TABLET BY MOUTH DAILY, Disp: 30 tablet, Rfl: 5  •  RA VITAMIN B-12 TR 1000 MCG tablet controlled-release, take 1 tablet by mouth once daily, Disp: 90 each, Rfl: 3  •  vitamin D (ERGOCALCIFEROL) 1.25 MG (52237 UT) capsule capsule, TAKE ONE CAPSULE BY MOUTH ONCE AT THE END OF EVERY WEEK, Disp: 4 capsule, Rfl: 4  •  chlorthalidone (HYGROTON) 25 MG tablet, TAKE ONE TABLET BY MOUTH DAILY, Disp: 30 tablet, Rfl: 5    Review of Systems   Constitutional: Negative.    Respiratory: Negative.  Negative for chest tightness and shortness of breath.    Cardiovascular: Negative.  Negative for chest pain.   Gastrointestinal: Negative for abdominal pain, blood in stool, constipation and diarrhea.        Vital Signs  Vitals:    07/01/21 1047   BP: 116/72   BP Location: Left arm   Patient Position: Sitting   Cuff Size: Large Adult   Pulse: 76   Temp: 97.8 °F (36.6 °C)   Weight: 117 kg (258 lb 3.2 oz)   Height: 180 cm (70.87\")   PainSc:   8   PainLoc: Leg  Comment: bilateral, hips and back       Physical Exam  Vitals reviewed.   Constitutional:       Appearance: He is well-developed. He is obese.   HENT:      Head: Normocephalic and atraumatic.   Cardiovascular:      Rate and Rhythm: Normal rate and regular rhythm.      Heart sounds: Normal heart sounds. No murmur heard.     Pulmonary:      Effort: Pulmonary effort is normal.      Breath sounds: Normal breath sounds.   Musculoskeletal:      Right lower leg: Edema present.      Left lower leg: Edema present.   Neurological:      Mental Status: He is alert and oriented to person, place, and time.          Procedures    ACE III MINI             Assessment/Plan:    Diagnoses and all orders for this visit:    1. Essential " hypertension (Primary)    2. Mixed hyperlipidemia    3. Class 2 obesity due to excess calories without serious comorbidity with body mass index (BMI) of 36.0 to 36.9 in adult    4. Gastroesophageal reflux disease without esophagitis    5. Reactive depression    6. Paresthesias    Plan    Blood pressure is now stable on olmesartan alone.  I have suggested that he stay off chlorthalidone until his blood pressure is over 130/80.  He will manage edema with salt restriction and elevation.    Lipids are well controlled on atorvastatin.    Patient's Body mass index is 36.15 kg/m². indicating that he is morbidly obese (BMI > 40 or > 35 with obesity - related health condition). Obesity-related health conditions include the following: hypertension, dyslipidemias and osteoarthritis. Obesity is worsening. BMI is is above average; BMI management plan is completed. We discussed portion control, increasing exercise and joining a fitness center or start home based exercise program..    GERD is controlled with Nexium.    Depression appears to be well compensated on bupropion.    Vitamin D is normal, he will continue high-dose vitamin D.    Paresthesias and discomfort in lower extremity could be stemming from his back.  He is given instructions for back exercises.  If he is not improving within 2 to 3 weeks would consider PT referral and imaging of his lumbar spine.    Plan of care reviewed with patient at the conclusion of today's visit. Education was provided regarding diagnosis, management, and any prescribed or recommended OTC medications.Patient verbalizes understanding of and agreement with management plan.         Wilfred Kim MD

## 2021-07-13 ENCOUNTER — TELEPHONE (OUTPATIENT)
Dept: INTERNAL MEDICINE | Facility: CLINIC | Age: 61
End: 2021-07-13

## 2021-07-13 DIAGNOSIS — M25.531 RIGHT WRIST PAIN: Primary | ICD-10-CM

## 2021-07-13 NOTE — TELEPHONE ENCOUNTER
Caller: Bala Staley III    Relationship: Self    Best call back number: [phone]    What is the medical concern/diagnosis: PATIENT STATES THATAT HIS LAST VISIT ON 7/1/2021;  PCP WAS GOING TO FIND AND REFER HIM TO HAND SURGEON/WRIST SPECIALIST.    PATIENT HAS HIS MRI RESULTS ALREADY AND WAITING ON THE REFERRAL. NOT SURE WHO HE WENT TO THE LAST TIME BEFORE PANDEMIC HIT.     What specialty or service is being requested: ORTHOPEDIC WRIST SPECIALIST/HAND SURGEON.       Any additional details: PREVIOUSLY WAS TOLD HE NEEDED CARE FROM SPECIALIST.     PLEASE CALL PATIENT WITH REFERRAL INFORMATION ASAP.  THANK YOU

## 2021-08-23 RX ORDER — ATORVASTATIN CALCIUM 40 MG/1
TABLET, FILM COATED ORAL
Qty: 30 TABLET | Refills: 5 | Status: SHIPPED | OUTPATIENT
Start: 2021-08-23 | End: 2022-02-22

## 2021-09-01 RX ORDER — LANOLIN ALCOHOL/MO/W.PET/CERES
CREAM (GRAM) TOPICAL
Qty: 60 TABLET | Refills: 5 | Status: SHIPPED | OUTPATIENT
Start: 2021-09-01 | End: 2022-03-03

## 2021-09-01 RX ORDER — ERGOCALCIFEROL 1.25 MG/1
CAPSULE ORAL
Qty: 4 CAPSULE | Refills: 5 | Status: SHIPPED | OUTPATIENT
Start: 2021-09-01 | End: 2022-02-22

## 2021-09-24 RX ORDER — POTASSIUM CHLORIDE 1500 MG/1
TABLET, EXTENDED RELEASE ORAL
Qty: 60 TABLET | Refills: 5 | Status: SHIPPED | OUTPATIENT
Start: 2021-09-24 | End: 2022-03-30 | Stop reason: SDUPTHER

## 2021-09-24 NOTE — TELEPHONE ENCOUNTER
Rx Refill Note  Requested Prescriptions     Pending Prescriptions Disp Refills   • KLOR-CON 20 MEQ CR tablet [Pharmacy Med Name: KLOR-CON M20 TABLET] 60 tablet 5     Sig: TAKE ONE TABLET BY MOUTH TWICE A DAY      Last office visit with prescribing clinician: 7/1/2021      Next office visit with prescribing clinician: 1/7/2022            Nohemy Swenson LPN  09/24/21, 10:59 EDT

## 2021-10-15 ENCOUNTER — TELEPHONE (OUTPATIENT)
Dept: INTERNAL MEDICINE | Facility: CLINIC | Age: 61
End: 2021-10-15

## 2021-10-15 NOTE — TELEPHONE ENCOUNTER
PT IS OUT OF TOWN IN [place] AND IS HEADING HOME, TESTING POSITIVE FOR COVID 2X.  PT WANTS TO KNOW IF THERE IS ANYTHING HE CAN TAKE TO CONTROL THE SYMPTOMS.

## 2021-10-15 NOTE — TELEPHONE ENCOUNTER
Patient complains of cough, runny nose, hot and cold (unsure if he has a fever), skin hurts. Symptoms started on Monday. They will not get home until 6pm tonight. 2 rapid test at drug store are positive. He verbalized understanding to treat symptoms with OTC meds.

## 2021-10-15 NOTE — TELEPHONE ENCOUNTER
Treatment of mild covid is aimed at symptom relief, ie, tylenol for pain or fever, cough medicine for cough.  What are his symptoms?

## 2021-10-25 NOTE — TELEPHONE ENCOUNTER
Rx Refill Note  Requested Prescriptions     Pending Prescriptions Disp Refills   • metoclopramide (REGLAN) 10 MG tablet [Pharmacy Med Name: METOCLOPRAMIDE 10 MG TABLET] 60 tablet 5     Sig: TAKE ONE TABLET BY MOUTH TWICE A DAY      Last office visit with prescribing clinician: 7/1/2021      Next office visit with prescribing clinician: 1/7/2022              Nohemy Swenson LPN  10/25/21, 10:08 EDT

## 2021-10-26 RX ORDER — METOCLOPRAMIDE 10 MG/1
TABLET ORAL
Qty: 60 TABLET | Refills: 5 | Status: SHIPPED | OUTPATIENT
Start: 2021-10-26 | End: 2022-11-11

## 2021-11-01 RX ORDER — CHLORTHALIDONE 25 MG/1
TABLET ORAL
Qty: 30 TABLET | Refills: 5 | Status: SHIPPED | OUTPATIENT
Start: 2021-11-01 | End: 2022-04-27

## 2022-01-06 ENCOUNTER — TELEPHONE (OUTPATIENT)
Dept: INTERNAL MEDICINE | Facility: CLINIC | Age: 62
End: 2022-01-06

## 2022-01-12 ENCOUNTER — TELEPHONE (OUTPATIENT)
Dept: INTERNAL MEDICINE | Facility: CLINIC | Age: 62
End: 2022-01-12

## 2022-01-12 DIAGNOSIS — R73.09 ABNORMAL GLUCOSE: ICD-10-CM

## 2022-01-12 DIAGNOSIS — I10 ESSENTIAL HYPERTENSION: Primary | ICD-10-CM

## 2022-01-12 DIAGNOSIS — E78.2 MIXED HYPERLIPIDEMIA: ICD-10-CM

## 2022-01-12 DIAGNOSIS — D50.0 IRON DEFICIENCY ANEMIA DUE TO CHRONIC BLOOD LOSS: ICD-10-CM

## 2022-01-12 DIAGNOSIS — E55.9 VITAMIN D DEFICIENCY: ICD-10-CM

## 2022-01-12 NOTE — TELEPHONE ENCOUNTER
Caller: Bala Staley III    Relationship: Self    Best call back number: [phone]    What orders are you requesting (i.e. lab or imaging): LAB WORK     In what timeframe would the patient need to come in: N/A    Where will you receive your lab/imaging services: N/A    Additional notes: PATIENT WOULD LIKE TO GET BLOOD WORK DONE BEFORE HIS APPOINTMENT    PLEASE ADVISE

## 2022-02-22 RX ORDER — ATORVASTATIN CALCIUM 40 MG/1
TABLET, FILM COATED ORAL
Qty: 30 TABLET | Refills: 5 | Status: SHIPPED | OUTPATIENT
Start: 2022-02-22 | End: 2022-08-31 | Stop reason: SDUPTHER

## 2022-02-22 RX ORDER — ERGOCALCIFEROL 1.25 MG/1
CAPSULE ORAL
Qty: 4 CAPSULE | Refills: 5 | Status: SHIPPED | OUTPATIENT
Start: 2022-02-22 | End: 2022-08-31 | Stop reason: SDUPTHER

## 2022-02-22 NOTE — TELEPHONE ENCOUNTER
Rx Refill Note  Requested Prescriptions     Pending Prescriptions Disp Refills   • vitamin D (ERGOCALCIFEROL) 1.25 MG (19962 UT) capsule capsule [Pharmacy Med Name: VIT D2 (ERGOCAL) 1.25MG(50,000U) CP] 4 capsule 5     Sig: TAKE ONE CAPSULE BY MOUTH ONCE WEEKLY **TAKE AT THE END OF EVERY WEEK**   • atorvastatin (LIPITOR) 40 MG tablet [Pharmacy Med Name: ATORVASTATIN 40 MG TABLET] 30 tablet 5     Sig: TAKE ONE TABLET BY MOUTH DAILY      Last office visit with prescribing clinician: 7/1/2021      Next office visit with prescribing clinician: 3/7/2022            Laura Maharaj RN  02/22/22, 13:32 EST

## 2022-03-03 RX ORDER — LANOLIN ALCOHOL/MO/W.PET/CERES
CREAM (GRAM) TOPICAL
Qty: 60 TABLET | Refills: 5 | Status: SHIPPED | OUTPATIENT
Start: 2022-03-03 | End: 2023-03-07 | Stop reason: SDUPTHER

## 2022-03-03 NOTE — TELEPHONE ENCOUNTER
Rx Refill Note  Requested Prescriptions     Pending Prescriptions Disp Refills   • ferrous sulfate 325 (65 FE) MG EC tablet [Pharmacy Med Name: FERROUS SULF  MG TABLET] 60 tablet 5     Sig: ONE BY MOUTH TWICE A DAY      Last office visit with prescribing clinician: 7/1/2021      Next office visit with prescribing clinician: 3/7/2022            Alba Lazaro LPN  03/03/22, 08:38 EST

## 2022-03-04 ENCOUNTER — LAB (OUTPATIENT)
Dept: LAB | Facility: HOSPITAL | Age: 62
End: 2022-03-04

## 2022-03-04 DIAGNOSIS — D50.0 IRON DEFICIENCY ANEMIA DUE TO CHRONIC BLOOD LOSS: ICD-10-CM

## 2022-03-04 DIAGNOSIS — E78.2 MIXED HYPERLIPIDEMIA: ICD-10-CM

## 2022-03-04 DIAGNOSIS — R73.09 ABNORMAL GLUCOSE: ICD-10-CM

## 2022-03-04 DIAGNOSIS — E55.9 VITAMIN D DEFICIENCY: ICD-10-CM

## 2022-03-04 LAB
25(OH)D3 SERPL-MCNC: 41.2 NG/ML
ALBUMIN SERPL-MCNC: 4.8 G/DL (ref 3.5–5.2)
ALBUMIN/GLOB SERPL: 1.7 G/DL
ALP SERPL-CCNC: 83 U/L (ref 39–117)
ALT SERPL W P-5'-P-CCNC: 60 U/L (ref 1–41)
ANION GAP SERPL CALCULATED.3IONS-SCNC: 14 MMOL/L (ref 5–15)
AST SERPL-CCNC: 43 U/L (ref 1–40)
BASOPHILS # BLD AUTO: 0.03 10*3/MM3 (ref 0–0.2)
BASOPHILS NFR BLD AUTO: 0.6 % (ref 0–1.5)
BILIRUB SERPL-MCNC: 0.8 MG/DL (ref 0–1.2)
BUN SERPL-MCNC: 10 MG/DL (ref 8–23)
BUN/CREAT SERPL: 10.6 (ref 7–25)
CALCIUM SPEC-SCNC: 9.9 MG/DL (ref 8.6–10.5)
CHLORIDE SERPL-SCNC: 99 MMOL/L (ref 98–107)
CHOLEST SERPL-MCNC: 190 MG/DL (ref 0–200)
CO2 SERPL-SCNC: 28 MMOL/L (ref 22–29)
CREAT SERPL-MCNC: 0.94 MG/DL (ref 0.76–1.27)
DEPRECATED RDW RBC AUTO: 50.5 FL (ref 37–54)
EGFRCR SERPLBLD CKD-EPI 2021: 92.2 ML/MIN/1.73
EOSINOPHIL # BLD AUTO: 0.16 10*3/MM3 (ref 0–0.4)
EOSINOPHIL NFR BLD AUTO: 3.4 % (ref 0.3–6.2)
ERYTHROCYTE [DISTWIDTH] IN BLOOD BY AUTOMATED COUNT: 13.1 % (ref 12.3–15.4)
GLOBULIN UR ELPH-MCNC: 2.9 GM/DL
GLUCOSE SERPL-MCNC: 81 MG/DL (ref 65–99)
HBA1C MFR BLD: 4.9 % (ref 4.8–5.6)
HCT VFR BLD AUTO: 56.5 % (ref 37.5–51)
HDLC SERPL-MCNC: 68 MG/DL (ref 40–60)
HGB BLD-MCNC: 19.5 G/DL (ref 13–17.7)
IMM GRANULOCYTES # BLD AUTO: 0.01 10*3/MM3 (ref 0–0.05)
IMM GRANULOCYTES NFR BLD AUTO: 0.2 % (ref 0–0.5)
IRON 24H UR-MRATE: 73 MCG/DL (ref 59–158)
IRON SATN MFR SERPL: 20 % (ref 20–50)
LDLC SERPL CALC-MCNC: 108 MG/DL (ref 0–100)
LDLC/HDLC SERPL: 1.57 {RATIO}
LYMPHOCYTES # BLD AUTO: 1.11 10*3/MM3 (ref 0.7–3.1)
LYMPHOCYTES NFR BLD AUTO: 23.5 % (ref 19.6–45.3)
MCH RBC QN AUTO: 36.2 PG (ref 26.6–33)
MCHC RBC AUTO-ENTMCNC: 34.5 G/DL (ref 31.5–35.7)
MCV RBC AUTO: 105 FL (ref 79–97)
MONOCYTES # BLD AUTO: 0.66 10*3/MM3 (ref 0.1–0.9)
MONOCYTES NFR BLD AUTO: 14 % (ref 5–12)
NEUTROPHILS NFR BLD AUTO: 2.75 10*3/MM3 (ref 1.7–7)
NEUTROPHILS NFR BLD AUTO: 58.3 % (ref 42.7–76)
NRBC BLD AUTO-RTO: 0 /100 WBC (ref 0–0.2)
PLATELET # BLD AUTO: 162 10*3/MM3 (ref 140–450)
PMV BLD AUTO: 11.1 FL (ref 6–12)
POTASSIUM SERPL-SCNC: 3.7 MMOL/L (ref 3.5–5.2)
PROT SERPL-MCNC: 7.7 G/DL (ref 6–8.5)
RBC # BLD AUTO: 5.38 10*6/MM3 (ref 4.14–5.8)
SODIUM SERPL-SCNC: 141 MMOL/L (ref 136–145)
TIBC SERPL-MCNC: 367 MCG/DL (ref 298–536)
TRANSFERRIN SERPL-MCNC: 246 MG/DL (ref 200–360)
TRIGL SERPL-MCNC: 75 MG/DL (ref 0–150)
VLDLC SERPL-MCNC: 14 MG/DL (ref 5–40)
WBC NRBC COR # BLD: 4.72 10*3/MM3 (ref 3.4–10.8)

## 2022-03-04 PROCEDURE — 83036 HEMOGLOBIN GLYCOSYLATED A1C: CPT

## 2022-03-04 PROCEDURE — 80061 LIPID PANEL: CPT

## 2022-03-04 PROCEDURE — 83540 ASSAY OF IRON: CPT

## 2022-03-04 PROCEDURE — 82306 VITAMIN D 25 HYDROXY: CPT

## 2022-03-04 PROCEDURE — 80053 COMPREHEN METABOLIC PANEL: CPT

## 2022-03-04 PROCEDURE — 85025 COMPLETE CBC W/AUTO DIFF WBC: CPT

## 2022-03-04 PROCEDURE — 84466 ASSAY OF TRANSFERRIN: CPT

## 2022-03-07 ENCOUNTER — OFFICE VISIT (OUTPATIENT)
Dept: INTERNAL MEDICINE | Facility: CLINIC | Age: 62
End: 2022-03-07

## 2022-03-07 VITALS
DIASTOLIC BLOOD PRESSURE: 86 MMHG | BODY MASS INDEX: 34.67 KG/M2 | HEIGHT: 70 IN | SYSTOLIC BLOOD PRESSURE: 126 MMHG | HEART RATE: 76 BPM | WEIGHT: 242.2 LBS | TEMPERATURE: 97.7 F

## 2022-03-07 DIAGNOSIS — Z00.00 PREVENTATIVE HEALTH CARE: Primary | ICD-10-CM

## 2022-03-07 DIAGNOSIS — D50.0 IRON DEFICIENCY ANEMIA DUE TO CHRONIC BLOOD LOSS: ICD-10-CM

## 2022-03-07 DIAGNOSIS — K21.9 GASTROESOPHAGEAL REFLUX DISEASE WITHOUT ESOPHAGITIS: ICD-10-CM

## 2022-03-07 DIAGNOSIS — E78.00 HYPERCHOLESTEROLEMIA: ICD-10-CM

## 2022-03-07 DIAGNOSIS — E55.9 VITAMIN D DEFICIENCY: ICD-10-CM

## 2022-03-07 DIAGNOSIS — F32.9 REACTIVE DEPRESSION: ICD-10-CM

## 2022-03-07 DIAGNOSIS — M54.50 ACUTE MIDLINE LOW BACK PAIN WITHOUT SCIATICA: ICD-10-CM

## 2022-03-07 DIAGNOSIS — I10 ESSENTIAL HYPERTENSION: ICD-10-CM

## 2022-03-07 PROCEDURE — 99396 PREV VISIT EST AGE 40-64: CPT | Performed by: INTERNAL MEDICINE

## 2022-03-07 RX ORDER — LEVOCETIRIZINE DIHYDROCHLORIDE 5 MG/1
5 TABLET, FILM COATED ORAL EVERY EVENING
COMMUNITY

## 2022-03-07 NOTE — PROGRESS NOTES
[facility]   The patient has consented to being recorded using LEONARD.    Bala Staley III  1960   [id number]      Patient Care Team:  Wilfred Kim MD as PCP - General (Internal Medicine)    Chief Complaint:: The patient presents for annual examination and for follow-up of his hypertension, hyperlipidemia, vitamin D deficiency, GERD, depression, and iron deficiency anemia.  Chief Complaint   Patient presents with   • Annual Exam        HPI    Hypertension  The patient's blood pressure is well controlled on chlorthalidone.    Weight loss  The patient states he has lost approximately 16 pounds. He states he is making better decisions of eating and portions. He reports if he was able to adds some exercise his weight would show more improvement; however, he is having lower back pain.    Low back pain  The patient states he has been experiencing low back pain for approximately 1 month. He states he can lay down all night long and sit all day long without pain. The patient states as soon as he stands up, the pain radiates everywhere. He denies any injury. The patient states the pain occasionally radiates down his legs. He denies any injury and states it just began hurting.    Iron deficiency anemia  The patient states his iron and potassium levels have improved. He denies any recent  fainting spells and attributes this to him taking an iron supplement. He acknowledges his blood count was mildly elevated, which may be attributed to the intake of the iron supplement. He notes he currently takes 2  iron tablets daily; however, will reduce the dose to 1 tablet daily.    Hyperlipidemia  He acknowledges his LDL is 108 mg/dl which is elevated. His HDL is 68 mg/dl.     Fatty liver disease  The patient acknowledges his transaminase is elevated.     General wellness  He denies needing any medication refills at this time.      Chronic Conditions:      Patient Active Problem List   Diagnosis   • Essential  hypertension   • Mixed hyperlipidemia   • Class 2 obesity due to excess calories without serious comorbidity in adult   • Reactive depression   • Gastroesophageal reflux disease without esophagitis   • Vitamin D deficiency   • Chronic low back pain   • Dyspnea on exertion   • Syncope, tussive   • Hypokalemia   • Cough syncope   • Allergic rhinitis   • Cough   • Hypercholesterolemia   • Iron deficiency anemia   • Vasovagal syncope   • Hernia of abdominal cavity        Past Medical History:   Diagnosis Date   • Anemia     iron deficiency   • Chronic low back pain 3/14/2018   • Colonic polyp    • ED (erectile dysfunction)    • Esophagitis    • Essential hypertension 3/14/2018   • Gastroesophageal reflux disease without esophagitis 3/14/2018   • Hernia of abdominal cavity    • Hyperlipidemia    • Reactive depression 3/14/2018   • Tennis elbow    • Vitamin D deficiency 3/14/2018       Past Surgical History:   Procedure Laterality Date   • APPENDECTOMY     • CARDIAC CATHETERIZATION  2011   • ELBOW ARTHROPLASTY      Right    • ENDOSCOPY  2011   • ENDOSCOPY  2008    with biopsy   • ESOPHAGEAL MOTILITY STUDY N/A 6/27/2019    Procedure: MANOMETRY;  Surgeon: Makr Lowery MD;  Location: [facility] ENDOSCOPY;  Service: Gastroenterology   • FOOT SURGERY      left foot        Family History   Problem Relation Age of Onset   • Alzheimer's disease Mother    • Heart disease Father    • Heart attack Father    • Colon cancer Father    • Coronary artery disease Father    • No Known Problems Sister    • Cancer Paternal Grandmother    • Lung cancer Paternal Grandmother        Social History     Socioeconomic History   • Marital status:    Tobacco Use   • Smoking status: Never Smoker   • Smokeless tobacco: Former User     Types: Chew     Quit date: 2002   Substance and Sexual Activity   • Alcohol use: Yes     Alcohol/week: 2.0 standard drinks     Types: 2 Shots of liquor per week     Comment: per day, [place]    • Drug use: No   •  Sexual activity: Yes     Partners: Female       Allergies   Allergen Reactions   • Sulfa Antibiotics Unknown (See Comments)     Pt unaware of this allergy         Current Outpatient Medications:   •  aspirin 81 MG EC tablet, Take 81 mg by mouth Daily., Disp: , Rfl:   •  atorvastatin (LIPITOR) 40 MG tablet, TAKE ONE TABLET BY MOUTH DAILY, Disp: 30 tablet, Rfl: 5  •  buPROPion XL (WELLBUTRIN XL) 150 MG 24 hr tablet, TAKE ONE TABLET BY MOUTH DAILY, Disp: 30 tablet, Rfl: 5  •  chlorthalidone (HYGROTON) 25 MG tablet, TAKE ONE TABLET BY MOUTH DAILY, Disp: 30 tablet, Rfl: 5  •  Esomeprazole Magnesium (NEXIUM PO), Take  by mouth., Disp: , Rfl:   •  ferrous sulfate 325 (65 FE) MG EC tablet, ONE BY MOUTH TWICE A DAY, Disp: 60 tablet, Rfl: 5  •  KLOR-CON 20 MEQ CR tablet, TAKE ONE TABLET BY MOUTH TWICE A DAY, Disp: 60 tablet, Rfl: 5  •  levocetirizine (XYZAL) 5 MG tablet, Take 5 mg by mouth Every Evening., Disp: , Rfl:   •  magnesium oxide (MAGOX) 400 (241.3 Mg) MG tablet tablet, Take 250 mg by mouth Daily., Disp: , Rfl:   •  metoclopramide (REGLAN) 10 MG tablet, TAKE ONE TABLET BY MOUTH TWICE A DAY, Disp: 60 tablet, Rfl: 5  •  RA VITAMIN B-12 TR 1000 MCG tablet controlled-release, take 1 tablet by mouth once daily, Disp: 90 each, Rfl: 3  •  TURMERIC PO, Take 1 tablet by mouth Daily., Disp: , Rfl:   •  vitamin D (ERGOCALCIFEROL) 1.25 MG (07977 UT) capsule capsule, TAKE ONE CAPSULE BY MOUTH ONCE WEEKLY **TAKE AT THE END OF EVERY WEEK**, Disp: 4 capsule, Rfl: 5    Immunization History   Administered Date(s) Administered   • COVID-19 (PFIZER) PURPLE CAP 03/11/2021, 04/27/2021, 01/04/2022   • Flu Vaccine Intradermal Quad 18-64YR 10/25/2018   • Flu Vaccine Quad PF >18YRS 11/25/2015   • Flu Vaccine Quad PF >36MO 10/04/2017   • FluLaval/Fluarix/Fluzone >6 09/18/2020   • Flublok 18+yrs 11/30/2021   • Hepatitis A 09/05/2019, 09/18/2020   • Influenza Quad Vaccine (Inpatient) 11/25/2015   • Influenza TIV (IM) 09/08/2009, 10/31/2014   •  "Influenza, Unspecified 10/25/2018   • Tdap 03/03/2011, 07/17/2014   • Zostavax 11/25/2015   • flucelvax quad pfs =>4 YRS 10/11/2019        Health Maintenance Due   Topic Date Due   • ZOSTER VACCINE (2 of 3) 01/20/2016   • ANNUAL PHYSICAL  09/19/2021        Review of Systems   Constitutional: Negative for activity change, chills, fatigue, fever, unexpected weight gain and unexpected weight loss.   HENT: Negative for congestion, ear pain, hearing loss, postnasal drip and trouble swallowing.    Eyes: Negative for blurred vision and visual disturbance.   Respiratory: Negative for cough and shortness of breath.    Cardiovascular: Negative for chest pain, palpitations and leg swelling.   Gastrointestinal: Negative for abdominal pain, blood in stool, constipation, diarrhea and indigestion.   Endocrine: Negative for cold intolerance, heat intolerance, polydipsia and polyuria.   Genitourinary: Negative for difficulty urinating, discharge, erectile dysfunction and testicular pain.   Musculoskeletal: Negative for back pain, gait problem, joint swelling and myalgias.   Skin: Negative for skin lesions.   Allergic/Immunologic: Negative for environmental allergies.   Neurological: Negative for dizziness, syncope, speech difficulty, weakness, numbness, headache, memory problem and confusion.   Hematological: Negative for adenopathy. Does not bruise/bleed easily.   Psychiatric/Behavioral: Negative for decreased concentration, sleep disturbance, depressed mood and stress. The patient is not nervous/anxious.       Review of systems is otherwise unremarkable.    Vital Signs  Vitals:    03/07/22 1612   BP: 126/86   BP Location: Left arm   Patient Position: Sitting   Cuff Size: Large Adult   Pulse: 76   Temp: 97.7 °F (36.5 °C)   Weight: 110 kg (242 lb 3.2 oz)   Height: 177.8 cm (70\")   PainSc: 9  Comment: at worst   PainLoc: Back       Physical Exam  Vitals and nursing note reviewed.   Constitutional:       Appearance: He is " well-developed. He is obese.   HENT:      Head: Normocephalic and atraumatic.      Right Ear: External ear normal.      Left Ear: External ear normal.      Nose: Nose normal.      Mouth/Throat:      Pharynx: No oropharyngeal exudate.   Eyes:      Conjunctiva/sclera: Conjunctivae normal.      Pupils: Pupils are equal, round, and reactive to light.   Neck:      Thyroid: No thyromegaly.      Vascular: No JVD.   Cardiovascular:      Rate and Rhythm: Normal rate and regular rhythm.      Heart sounds: Normal heart sounds. No murmur heard.    No friction rub. No gallop.   Pulmonary:      Effort: Pulmonary effort is normal. No respiratory distress.      Breath sounds: Normal breath sounds. No wheezing or rales.   Chest:      Chest wall: No tenderness.   Abdominal:      General: Bowel sounds are normal. There is no distension.      Palpations: Abdomen is soft. There is no mass.      Tenderness: There is no abdominal tenderness. There is no guarding or rebound.      Hernia: No hernia is present.   Musculoskeletal:         General: No tenderness. Normal range of motion.      Cervical back: Normal range of motion and neck supple.   Lymphadenopathy:      Cervical: No cervical adenopathy.   Skin:     General: Skin is warm and dry.      Findings: No erythema or rash.   Neurological:      Mental Status: He is alert and oriented to person, place, and time.      Cranial Nerves: No cranial nerve deficit.      Sensory: No sensory deficit.      Motor: No abnormal muscle tone.      Coordination: Coordination normal.      Deep Tendon Reflexes: Reflexes normal.   Psychiatric:         Behavior: Behavior normal.         Thought Content: Thought content normal.         Judgment: Judgment normal.          Procedures     Fall Risk Screen:  Critical access hospital Fall Risk Assessment has not been completed.    Health Habits and Functional and Cognitive Screening:  No flowsheet data found.    Depression [place]  PHQ-9 Total Score: 0     ACE III MINI              Assessment/Plan:    Diagnoses and all orders for this visit:    1. Preventative health care (Primary)    2. Essential hypertension    3. Hypercholesterolemia    4. Vitamin D deficiency    5. Gastroesophageal reflux disease without esophagitis    6. Reactive depression    7. Iron deficiency anemia due to chronic blood loss    8. Acute midline low back pain without sciatica  -     XR Spine Lumbar 4+ View; Future            Labs  Results for orders placed or performed in visit on 03/04/22   Comprehensive Metabolic Panel    Specimen: Blood   Result Value Ref Range    Glucose 81 65 - 99 mg/dL    BUN 10 8 - 23 mg/dL    Creatinine 0.94 0.76 - 1.27 mg/dL    Sodium 141 136 - 145 mmol/L    Potassium 3.7 3.5 - 5.2 mmol/L    Chloride 99 98 - 107 mmol/L    CO2 28.0 22.0 - 29.0 mmol/L    Calcium 9.9 8.6 - 10.5 mg/dL    Total Protein 7.7 6.0 - 8.5 g/dL    Albumin 4.80 3.50 - 5.20 g/dL    ALT (SGPT) 60 (H) 1 - 41 U/L    AST (SGOT) 43 (H) 1 - 40 U/L    Alkaline Phosphatase 83 39 - 117 U/L    Total Bilirubin 0.8 0.0 - 1.2 mg/dL    Globulin 2.9 gm/dL    A/G Ratio 1.7 g/dL    BUN/Creatinine Ratio 10.6 7.0 - 25.0    Anion Gap 14.0 5.0 - 15.0 mmol/L    eGFR 92.2 >60.0 mL/min/1.73   Hemoglobin A1c    Specimen: Blood   Result Value Ref Range    Hemoglobin A1C 4.90 4.80 - 5.60 %   Lipid Panel    Specimen: Blood   Result Value Ref Range    Total Cholesterol 190 0 - 200 mg/dL    Triglycerides 75 0 - 150 mg/dL    HDL Cholesterol 68 (H) 40 - 60 mg/dL    LDL Cholesterol  108 (H) 0 - 100 mg/dL    VLDL Cholesterol 14 5 - 40 mg/dL    LDL/HDL Ratio 1.57    Iron Profile    Specimen: Blood   Result Value Ref Range    Iron 73 59 - 158 mcg/dL    Iron Saturation 20 20 - 50 %    Transferrin 246 200 - 360 mg/dL    TIBC 367 298 - 536 mcg/dL   Vitamin D 25 Hydroxy    Specimen: Blood   Result Value Ref Range    25 Hydroxy, Vitamin D 41.2 ng/ml   CBC Auto Differential    Specimen: Blood   Result Value Ref Range    WBC 4.72 3.40 - 10.80 10*3/mm3    RBC 5.38  4.14 - 5.80 10*6/mm3    Hemoglobin 19.5 (H) 13.0 - 17.7 g/dL    Hematocrit 56.5 (H) 37.5 - 51.0 %    .0 (H) 79.0 - 97.0 fL    MCH 36.2 (H) 26.6 - 33.0 pg    MCHC 34.5 31.5 - 35.7 g/dL    RDW 13.1 12.3 - 15.4 %    RDW-SD 50.5 37.0 - 54.0 fl    MPV 11.1 6.0 - 12.0 fL    Platelets 162 140 - 450 10*3/mm3    Neutrophil % 58.3 42.7 - 76.0 %    Lymphocyte % 23.5 19.6 - 45.3 %    Monocyte % 14.0 (H) 5.0 - 12.0 %    Eosinophil % 3.4 0.3 - 6.2 %    Basophil % 0.6 0.0 - 1.5 %    Immature Grans % 0.2 0.0 - 0.5 %    Neutrophils, Absolute 2.75 1.70 - 7.00 10*3/mm3    Lymphocytes, Absolute 1.11 0.70 - 3.10 10*3/mm3    Monocytes, Absolute 0.66 0.10 - 0.90 10*3/mm3    Eosinophils, Absolute 0.16 0.00 - 0.40 10*3/mm3    Basophils, Absolute 0.03 0.00 - 0.20 10*3/mm3    Immature Grans, Absolute 0.01 0.00 - 0.05 10*3/mm3    nRBC 0.0 0.0 - 0.2 /100 WBC        Counseling was given to patient for the following topics: appropriate exercise 150 minutes week, disease prevention and healthy eating habits.  1. Prevention  - Overall, he has made good progress. He has lost 16 pounds. He is recovering from right wrist surgery. He is up to date on colorectal cancer screening and fully vaccinated against COVID-19 and influenza.    2. Hypertension  - Blood pressure is well controlled on chlorthalidone.    3. Hyperlipidemia  - Lipids are controlled on atorvastatin.    4. Vitamin D deficiency  - Vitamin D level is normal. He will continue current dose of vitamin D.    5. GERD  - GERD is asymptomatic on Nexium.    6. Iron deficiency anemia  - His iron levels are now normal and his hemoglobin is high. I have instructed him to reduce his iron dose to 1 tablet a day. If on return levels remain high, we will discontinue and follow closely. His cough syncope went away with replacement of iron.    7. Fatty liver disease  - Transaminase remain slightly elevated, but are stable.    8. Depression  - Mood is well controlled on bupropion.    9. Acute low  back pain  - We will obtain radiographs. If negative, refer to PT.    Follow up in 6 months.    Patient's Body mass index is 34.75 kg/m². indicating that he is obese (BMI >30). Obesity-related health conditions include the following: hypertension, dyslipidemias and GERD. Obesity is unchanged. BMI is is above average; BMI management plan is completed. We discussed portion control, increasing exercise and joining a fitness center or start home based exercise program..      Plan of care reviewed with patient at the conclusion of today's visit. Education was provided regarding diagnosis, management, and any prescribed or recommended OTC medications.Patient verbalizes understanding of and agreement with management plan.         Wilfred Kim MD        Transcribed from ambient dictation for Wilfred Kim MD by Helene Cuevas.  03/08/22   15:43 EST    Patient verbalized consent to the visit recording.

## 2022-03-11 ENCOUNTER — HOSPITAL ENCOUNTER (OUTPATIENT)
Dept: GENERAL RADIOLOGY | Facility: HOSPITAL | Age: 62
Discharge: HOME | End: 2022-03-11
Admitting: INTERNAL MEDICINE

## 2022-03-11 DIAGNOSIS — M54.50 ACUTE MIDLINE LOW BACK PAIN WITHOUT SCIATICA: ICD-10-CM

## 2022-03-11 PROCEDURE — 72110 X-RAY EXAM L-2 SPINE 4/>VWS: CPT

## 2022-03-14 DIAGNOSIS — M54.50 ACUTE MIDLINE LOW BACK PAIN WITHOUT SCIATICA: Primary | ICD-10-CM

## 2022-03-30 RX ORDER — POTASSIUM CHLORIDE 20 MEQ/1
20 TABLET, EXTENDED RELEASE ORAL 2 TIMES DAILY
Qty: 60 TABLET | Refills: 5 | Status: SHIPPED | OUTPATIENT
Start: 2022-03-30 | End: 2022-08-31 | Stop reason: SDUPTHER

## 2022-04-27 RX ORDER — CHLORTHALIDONE 25 MG/1
TABLET ORAL
Qty: 90 TABLET | Refills: 1 | Status: SHIPPED | OUTPATIENT
Start: 2022-04-27 | End: 2022-09-22

## 2022-06-28 ENCOUNTER — OFFICE VISIT (OUTPATIENT)
Dept: INTERNAL MEDICINE | Facility: CLINIC | Age: 62
End: 2022-06-28

## 2022-06-28 VITALS
TEMPERATURE: 98 F | DIASTOLIC BLOOD PRESSURE: 88 MMHG | HEART RATE: 95 BPM | OXYGEN SATURATION: 98 % | SYSTOLIC BLOOD PRESSURE: 132 MMHG | HEIGHT: 70 IN | WEIGHT: 242 LBS | BODY MASS INDEX: 34.65 KG/M2

## 2022-06-28 DIAGNOSIS — M54.31 RIGHT SIDED SCIATICA: Primary | ICD-10-CM

## 2022-06-28 PROCEDURE — 96372 THER/PROPH/DIAG INJ SC/IM: CPT | Performed by: PHYSICIAN ASSISTANT

## 2022-06-28 PROCEDURE — 99214 OFFICE O/P EST MOD 30 MIN: CPT | Performed by: PHYSICIAN ASSISTANT

## 2022-06-28 RX ORDER — TRIAMCINOLONE ACETONIDE 40 MG/ML
80 INJECTION, SUSPENSION INTRA-ARTICULAR; INTRAMUSCULAR ONCE
Status: COMPLETED | OUTPATIENT
Start: 2022-06-28 | End: 2022-06-28

## 2022-06-28 RX ORDER — CYCLOBENZAPRINE HCL 10 MG
10 TABLET ORAL NIGHTLY PRN
Qty: 20 TABLET | Refills: 0 | Status: SHIPPED | OUTPATIENT
Start: 2022-06-28 | End: 2022-08-18

## 2022-06-28 RX ADMIN — TRIAMCINOLONE ACETONIDE 80 MG: 40 INJECTION, SUSPENSION INTRA-ARTICULAR; INTRAMUSCULAR at 14:55

## 2022-07-18 ENCOUNTER — OFFICE VISIT (OUTPATIENT)
Dept: INTERNAL MEDICINE | Facility: CLINIC | Age: 62
End: 2022-07-18

## 2022-07-18 VITALS
SYSTOLIC BLOOD PRESSURE: 132 MMHG | BODY MASS INDEX: 33.36 KG/M2 | WEIGHT: 233 LBS | TEMPERATURE: 97.3 F | DIASTOLIC BLOOD PRESSURE: 66 MMHG | HEART RATE: 84 BPM | HEIGHT: 70 IN

## 2022-07-18 DIAGNOSIS — I10 ESSENTIAL HYPERTENSION: Primary | ICD-10-CM

## 2022-07-18 DIAGNOSIS — R63.4 WEIGHT LOSS: ICD-10-CM

## 2022-07-18 PROCEDURE — 99213 OFFICE O/P EST LOW 20 MIN: CPT | Performed by: INTERNAL MEDICINE

## 2022-07-18 RX ORDER — CELECOXIB 200 MG/1
200 CAPSULE ORAL DAILY
Qty: 30 CAPSULE | Refills: 2 | Status: SHIPPED | OUTPATIENT
Start: 2022-07-18 | End: 2022-09-09

## 2022-07-18 NOTE — PROGRESS NOTES
[facility]     Bala BOONE [facility] III  1960   [id number]      Patient Care Team:  Wilfred Kim MD as PCP - General (Internal Medicine)    Chief Complaint::   Chief Complaint   Patient presents with   • Weight Loss        HPI  Weight loss  The patient states that he has not done anything to lose weight and appetite is normal. He reports that he eats cheeseburgers, pizza, and Chinese food but he has lost 10 pounds of fluid and that his ankles are smaller. The patient does not have diarrhea or any other GI symptoms. He states that his bowels are okay.    Back pain with right sided sciatica  He states that he has physical therapy and this has improved him overall. The patient has muscle relaxers but feels as if he is taking too many of them. He is willing to do an MRI but he is claustrophobic. The patient has taken 4 ibuprofen every 4 hours.    Chronic Conditions:      Patient Active Problem List   Diagnosis   • Essential hypertension   • Mixed hyperlipidemia   • Class 2 obesity due to excess calories without serious comorbidity in adult   • Reactive depression   • Gastroesophageal reflux disease without esophagitis   • Vitamin D deficiency   • Chronic low back pain   • Dyspnea on exertion   • Syncope, tussive   • Hypokalemia   • Cough syncope   • Allergic rhinitis   • Cough   • Hypercholesterolemia   • Iron deficiency anemia   • Vasovagal syncope   • Hernia of abdominal cavity   • Right sided sciatica        Past Medical History:   Diagnosis Date   • Anemia     iron deficiency   • Chronic low back pain 3/14/2018   • Colonic polyp    • ED (erectile dysfunction)    • Esophagitis    • Essential hypertension 3/14/2018   • Gastroesophageal reflux disease without esophagitis 3/14/2018   • Hernia of abdominal cavity    • Hyperlipidemia    • Reactive depression 3/14/2018   • Tennis elbow    • Vitamin D deficiency 3/14/2018       Past Surgical History:   Procedure Laterality Date   • APPENDECTOMY     •  CARDIAC CATHETERIZATION  2011   • ELBOW ARTHROPLASTY      Right    • ENDOSCOPY  2011   • ENDOSCOPY  2008    with biopsy   • ESOPHAGEAL MOTILITY STUDY N/A 6/27/2019    Procedure: MANOMETRY;  Surgeon: Mark Lowery MD;  Location: [facility] ENDOSCOPY;  Service: Gastroenterology   • FOOT SURGERY      left foot        Family History   Problem Relation Age of Onset   • Alzheimer's disease Mother    • Heart disease Father    • Heart attack Father    • Colon cancer Father    • Coronary artery disease Father    • No Known Problems Sister    • Cancer Paternal Grandmother    • Lung cancer Paternal Grandmother        Social History     Socioeconomic History   • Marital status:    Tobacco Use   • Smoking status: Never Smoker   • Smokeless tobacco: Former User     Types: Chew     Quit date: 2002   Substance and Sexual Activity   • Alcohol use: Yes     Alcohol/week: 2.0 standard drinks     Types: 2 Shots of liquor per week     Comment: per day, [place]    • Drug use: No   • Sexual activity: Yes     Partners: Female       Allergies   Allergen Reactions   • Sulfa Antibiotics Unknown (See Comments)     Pt unaware of this allergy         Current Outpatient Medications:   •  aspirin 81 MG EC tablet, Take 81 mg by mouth Daily., Disp: , Rfl:   •  atorvastatin (LIPITOR) 40 MG tablet, TAKE ONE TABLET BY MOUTH DAILY, Disp: 30 tablet, Rfl: 5  •  chlorthalidone (HYGROTON) 25 MG tablet, TAKE ONE TABLET BY MOUTH DAILY, Disp: 90 tablet, Rfl: 1  •  cyclobenzaprine (FLEXERIL) 10 MG tablet, Take 1 tablet by mouth At Night As Needed for Muscle Spasms., Disp: 20 tablet, Rfl: 0  •  esomeprazole (NexIUM) 40 MG packet, Take 1 capsule by mouth Every Night., Disp: , Rfl:   •  ferrous sulfate 325 (65 FE) MG EC tablet, ONE BY MOUTH TWICE A DAY (Patient taking differently: Take 325 mg by mouth Daily.), Disp: 60 tablet, Rfl: 5  •  levocetirizine (XYZAL) 5 MG tablet, Take 5 mg by mouth Every Evening., Disp: , Rfl:   •  MAGNESIUM OXIDE PO, Take 250 mg  "by mouth Daily., Disp: , Rfl:   •  metoclopramide (REGLAN) 10 MG tablet, TAKE ONE TABLET BY MOUTH TWICE A DAY (Patient taking differently: Take 10 mg by mouth Daily.), Disp: 60 tablet, Rfl: 5  •  potassium chloride (KLOR-CON) 20 MEQ CR tablet, Take 1 tablet by mouth 2 (Two) Times a Day., Disp: 60 tablet, Rfl: 5  •  RA VITAMIN B-12 TR 1000 MCG tablet controlled-release, take 1 tablet by mouth once daily, Disp: 90 each, Rfl: 3  •  TURMERIC PO, Take 1 tablet by mouth Daily., Disp: , Rfl:   •  vitamin D (ERGOCALCIFEROL) 1.25 MG (28167 UT) capsule capsule, TAKE ONE CAPSULE BY MOUTH ONCE WEEKLY **TAKE AT THE END OF EVERY WEEK**, Disp: 4 capsule, Rfl: 5  •  celecoxib (CeleBREX) 200 MG capsule, Take 1 capsule by mouth Daily., Disp: 30 capsule, Rfl: 2    Review of Systems   Constitutional: Negative for chills, fatigue and fever.   HENT: Negative for congestion, ear pain and sinus pressure.    Respiratory: Negative for cough, chest tightness, shortness of breath and wheezing.    Cardiovascular: Negative for chest pain and palpitations.   Gastrointestinal: Negative for abdominal pain, blood in stool and constipation.   Skin: Negative for color change.   Allergic/Immunologic: Negative for environmental allergies.   Neurological: Negative for dizziness, speech difficulty and headache.   Psychiatric/Behavioral: Negative for decreased concentration. The patient is not nervous/anxious.         Vital Signs  Vitals:    07/18/22 1627   BP: 132/66   BP Location: Left arm   Patient Position: Sitting   Cuff Size: Adult   Pulse: 84   Temp: 97.3 °F (36.3 °C)   TempSrc: Temporal   Weight: 106 kg (233 lb)   Height: 177.8 cm (70\")   PainSc:   6   PainLoc: Back       Physical Exam  Vitals reviewed.   Constitutional:       Appearance: He is well-developed. He is obese.   HENT:      Head: Normocephalic and atraumatic.   Eyes:      Pupils: Pupils are equal, round, and reactive to light.   Cardiovascular:      Rate and Rhythm: Normal rate and " regular rhythm.      Heart sounds: Normal heart sounds.   Pulmonary:      Effort: Pulmonary effort is normal.      Breath sounds: Normal breath sounds.   Abdominal:      General: Bowel sounds are normal.      Palpations: Abdomen is soft.   Musculoskeletal:         General: Normal range of motion.      Cervical back: Normal range of motion.   Skin:     General: Skin is warm and dry.   Neurological:      Mental Status: He is alert and oriented to person, place, and time.          Procedures    ACE III MINI             Assessment/Plan:    Diagnoses and all orders for this visit:    1. Essential hypertension (Primary)  -     CBC & Differential; Future  -     Comprehensive Metabolic Panel; Future    2. Weight loss  -     TSH; Future    Other orders  -     celecoxib (CeleBREX) 200 MG capsule; Take 1 capsule by mouth Daily.  Dispense: 30 capsule; Refill: 2          1. Weight loss  - He has lost nearly 30 pounds in the last several months. His caloric intake has not changed and he does not have diarrhea or any other GI symptoms. We will obtain baseline blood work including lab work. He has had evidence of a fatty liver in the past. He had an extensive GI work-up a few years ago but did not have CT scans on his abdomen; however, he did have CT scans of the lung. If lab work is not helpful, would obtain CT scan of the abdomen and pelvis.    2. Low back pain with right sided sciatica  - I have asked him to continue with physical therapy for a bit longer. He is taking too much ibuprofen. I have given him a prescription for Celebrex 200 mg per day and suggested he take that in place of ibuprofen and add Tylenol 1000 mg twice a day. If the ibuprofen is more effective, I suggested that he reduce the 800 mg of ibuprofen to 4 times a day and take one 500 mg Tylenol with it, but if he is not better soon, he will let me know and we will schedule MRI.    Plan of care reviewed with patient at the conclusion of today's visit. Education  was provided regarding diagnosis, management, and any prescribed or recommended OTC medications.Patient verbalizes understanding of and agreement with management plan.         Wilfred Kim MD     ;Transcribed from ambient dictation for Wilfred Kim MD by Emmett Palacios.  07/18/22   20:24 EDT    Patient verbalized consent to the visit recording.

## 2022-07-19 ENCOUNTER — TELEPHONE (OUTPATIENT)
Dept: INTERNAL MEDICINE | Facility: CLINIC | Age: 62
End: 2022-07-19

## 2022-07-19 ENCOUNTER — LAB (OUTPATIENT)
Dept: LAB | Facility: HOSPITAL | Age: 62
End: 2022-07-19

## 2022-07-19 DIAGNOSIS — I10 ESSENTIAL HYPERTENSION: ICD-10-CM

## 2022-07-19 DIAGNOSIS — R63.4 WEIGHT LOSS: ICD-10-CM

## 2022-07-19 DIAGNOSIS — E87.6 HYPOKALEMIA: Primary | ICD-10-CM

## 2022-07-19 DIAGNOSIS — R79.89 ABNORMAL LIVER FUNCTION TESTS: ICD-10-CM

## 2022-07-19 LAB
BASOPHILS # BLD AUTO: 0.03 10*3/MM3 (ref 0–0.2)
BASOPHILS NFR BLD AUTO: 0.4 % (ref 0–1.5)
DEPRECATED RDW RBC AUTO: 48.6 FL (ref 37–54)
EOSINOPHIL # BLD AUTO: 0.09 10*3/MM3 (ref 0–0.4)
EOSINOPHIL NFR BLD AUTO: 1.2 % (ref 0.3–6.2)
ERYTHROCYTE [DISTWIDTH] IN BLOOD BY AUTOMATED COUNT: 12.8 % (ref 12.3–15.4)
HCT VFR BLD AUTO: 52.9 % (ref 37.5–51)
HGB BLD-MCNC: 18.7 G/DL (ref 13–17.7)
IMM GRANULOCYTES # BLD AUTO: 0.01 10*3/MM3 (ref 0–0.05)
IMM GRANULOCYTES NFR BLD AUTO: 0.1 % (ref 0–0.5)
LYMPHOCYTES # BLD AUTO: 0.87 10*3/MM3 (ref 0.7–3.1)
LYMPHOCYTES NFR BLD AUTO: 11.8 % (ref 19.6–45.3)
MCH RBC QN AUTO: 36.6 PG (ref 26.6–33)
MCHC RBC AUTO-ENTMCNC: 35.3 G/DL (ref 31.5–35.7)
MCV RBC AUTO: 103.5 FL (ref 79–97)
MONOCYTES # BLD AUTO: 0.89 10*3/MM3 (ref 0.1–0.9)
MONOCYTES NFR BLD AUTO: 12 % (ref 5–12)
NEUTROPHILS NFR BLD AUTO: 5.51 10*3/MM3 (ref 1.7–7)
NEUTROPHILS NFR BLD AUTO: 74.5 % (ref 42.7–76)
NRBC BLD AUTO-RTO: 0 /100 WBC (ref 0–0.2)
PLATELET # BLD AUTO: 172 10*3/MM3 (ref 140–450)
PMV BLD AUTO: 10.7 FL (ref 6–12)
RBC # BLD AUTO: 5.11 10*6/MM3 (ref 4.14–5.8)
WBC NRBC COR # BLD: 7.4 10*3/MM3 (ref 3.4–10.8)

## 2022-07-19 PROCEDURE — 85025 COMPLETE CBC W/AUTO DIFF WBC: CPT

## 2022-07-19 PROCEDURE — 80053 COMPREHEN METABOLIC PANEL: CPT

## 2022-07-19 PROCEDURE — 84443 ASSAY THYROID STIM HORMONE: CPT

## 2022-07-19 NOTE — TELEPHONE ENCOUNTER
Caller: Bala Staley III    Relationship to patient: Self    Best call back number: [phone]    FYI: PATIENT STATED THAT HE WAS IN OFFICE YESTERDAY AND FORGOT TO MENTION THAT ONE SYMPTOM IS THAT HE WAS HAVING AND THAT IS HIS URINE HAS A VERY STRONG SMELL TO IT THAT IS CONSTANT       PLEASE ADVISE

## 2022-07-20 ENCOUNTER — LAB (OUTPATIENT)
Dept: LAB | Facility: HOSPITAL | Age: 62
End: 2022-07-20

## 2022-07-20 DIAGNOSIS — E87.6 HYPOKALEMIA: ICD-10-CM

## 2022-07-20 DIAGNOSIS — E87.6 HYPOKALEMIA: Primary | ICD-10-CM

## 2022-07-20 LAB
ALBUMIN SERPL-MCNC: 4.7 G/DL (ref 3.5–5.2)
ALBUMIN/GLOB SERPL: 1.7 G/DL
ALP SERPL-CCNC: 99 U/L (ref 39–117)
ALT SERPL W P-5'-P-CCNC: 80 U/L (ref 1–41)
ANION GAP SERPL CALCULATED.3IONS-SCNC: 17 MMOL/L (ref 5–15)
AST SERPL-CCNC: 66 U/L (ref 1–40)
BILIRUB SERPL-MCNC: 0.9 MG/DL (ref 0–1.2)
BUN SERPL-MCNC: 13 MG/DL (ref 8–23)
BUN/CREAT SERPL: 13.4 (ref 7–25)
CALCIUM SPEC-SCNC: 10 MG/DL (ref 8.6–10.5)
CHLORIDE SERPL-SCNC: 94 MMOL/L (ref 98–107)
CO2 SERPL-SCNC: 25 MMOL/L (ref 22–29)
CREAT SERPL-MCNC: 0.97 MG/DL (ref 0.76–1.27)
EGFRCR SERPLBLD CKD-EPI 2021: 88.3 ML/MIN/1.73
GLOBULIN UR ELPH-MCNC: 2.7 GM/DL
GLUCOSE SERPL-MCNC: 74 MG/DL (ref 65–99)
POTASSIUM SERPL-SCNC: 3.2 MMOL/L (ref 3.5–5.2)
PROT SERPL-MCNC: 7.4 G/DL (ref 6–8.5)
SODIUM SERPL-SCNC: 136 MMOL/L (ref 136–145)
TSH SERPL DL<=0.05 MIU/L-ACNC: 1.25 UIU/ML (ref 0.27–4.2)

## 2022-07-20 PROCEDURE — 81001 URINALYSIS AUTO W/SCOPE: CPT

## 2022-07-20 PROCEDURE — 82570 ASSAY OF URINE CREATININE: CPT

## 2022-07-20 PROCEDURE — 84133 ASSAY OF URINE POTASSIUM: CPT

## 2022-07-20 NOTE — TELEPHONE ENCOUNTER
I will order urinalysis, he will need to go to the lab.  His other labs are fine except that his potassium is slightly low, so he should take an extra potassium a day, and his liver function tests remain slightly elevated.  I am going to go ahead and order a CAT scan, but he should come by the lab to leave a urine specimen when he can.

## 2022-07-21 LAB
BACTERIA UR QL AUTO: NORMAL /HPF
BILIRUB UR QL STRIP: NEGATIVE
CLARITY UR: CLEAR
COLOR UR: YELLOW
CREAT UR-MCNC: 129.2 MG/DL
GLUCOSE UR STRIP-MCNC: NEGATIVE MG/DL
HGB UR QL STRIP.AUTO: NEGATIVE
HYALINE CASTS UR QL AUTO: NORMAL /LPF
KETONES UR QL STRIP: ABNORMAL
LEUKOCYTE ESTERASE UR QL STRIP.AUTO: NEGATIVE
NITRITE UR QL STRIP: NEGATIVE
PH UR STRIP.AUTO: 7.5 [PH] (ref 5–8)
POTASSIUM UR-SCNC: 84.2 MMOL/L
PROT UR QL STRIP: ABNORMAL
RBC # UR STRIP: NORMAL /HPF
REF LAB TEST METHOD: NORMAL
SP GR UR STRIP: 1.02 (ref 1–1.03)
SQUAMOUS #/AREA URNS HPF: NORMAL /HPF
UROBILINOGEN UR QL STRIP: ABNORMAL
WBC # UR STRIP: NORMAL /HPF

## 2022-07-29 ENCOUNTER — HOSPITAL ENCOUNTER (OUTPATIENT)
Dept: CT IMAGING | Facility: HOSPITAL | Age: 62
Discharge: HOME | End: 2022-07-29
Admitting: INTERNAL MEDICINE

## 2022-07-29 DIAGNOSIS — R63.4 WEIGHT LOSS: ICD-10-CM

## 2022-07-29 DIAGNOSIS — R79.89 ABNORMAL LIVER FUNCTION TESTS: ICD-10-CM

## 2022-07-29 PROCEDURE — 25010000002 IOPAMIDOL 61 % SOLUTION: Performed by: INTERNAL MEDICINE

## 2022-07-29 PROCEDURE — 74178 CT ABD&PLV WO CNTR FLWD CNTR: CPT

## 2022-07-29 RX ADMIN — IOPAMIDOL 95 ML: 612 INJECTION, SOLUTION INTRAVENOUS at 14:31

## 2022-08-03 ENCOUNTER — TELEPHONE (OUTPATIENT)
Dept: PEDIATRICS | Facility: OTHER | Age: 62
End: 2022-08-03

## 2022-08-03 DIAGNOSIS — R63.4 WEIGHT LOSS: Primary | ICD-10-CM

## 2022-08-03 DIAGNOSIS — K76.0 FATTY LIVER: ICD-10-CM

## 2022-08-03 NOTE — TELEPHONE ENCOUNTER
Caller: Bala Staley III    Relationship: Self    Best call back number:[phone]   Caller requesting test results: PATIENT    What test was performed: 07.29.22  When was the test performed: CAT SCAN OF CHEST AND ABDOMINAL AREA  Where was the test performed: [facility]  Additional notes: PLEASE CALL WITH RESULTS AND ADVISE ON THE RSULTS

## 2022-08-03 NOTE — TELEPHONE ENCOUNTER
The only thing the scan shows is fatty liver, which we already knew about.  I am going to send him back to Dr Harry.  If he can't find anything, will ask Dr Leos to see him again.

## 2022-08-04 ENCOUNTER — TELEPHONE (OUTPATIENT)
Dept: INTERNAL MEDICINE | Facility: CLINIC | Age: 62
End: 2022-08-04

## 2022-08-04 ENCOUNTER — OFFICE VISIT (OUTPATIENT)
Dept: INTERNAL MEDICINE | Facility: CLINIC | Age: 62
End: 2022-08-04

## 2022-08-04 VITALS
HEIGHT: 70 IN | HEART RATE: 92 BPM | DIASTOLIC BLOOD PRESSURE: 76 MMHG | WEIGHT: 227 LBS | SYSTOLIC BLOOD PRESSURE: 122 MMHG | BODY MASS INDEX: 32.5 KG/M2 | TEMPERATURE: 97.8 F

## 2022-08-04 DIAGNOSIS — L30.9 DERMATITIS: Primary | ICD-10-CM

## 2022-08-04 PROCEDURE — 99213 OFFICE O/P EST LOW 20 MIN: CPT | Performed by: NURSE PRACTITIONER

## 2022-08-04 RX ORDER — PREDNISONE 10 MG/1
TABLET ORAL
Qty: 21 TABLET | Refills: 0 | Status: SHIPPED | OUTPATIENT
Start: 2022-08-04 | End: 2022-09-09

## 2022-08-04 NOTE — PROGRESS NOTES
Follow Up Office Visit      Patient Name: Bala Staley III  : 1960   MRN: [id number]   Care Team: Patient Care Team:  Wilfred Kim MD as PCP - General (Internal Medicine)    Chief Complaint:    Chief Complaint   Patient presents with   • Rash       History of Present Illness: Bala Staley III is a 62 y.o. male with pertinent medical history significant for hypertension, hyperlipidemia, hypercholesterolemia, vitamin D deficiency, GERD, chronic low back pain and recent abrupt weight loss of approximately 30 pounds.    He presents today for new issue of rash on bilateral lower extremities and upper extremities.    States that 1 day prior to onset of symptoms, he underwent a CT of abdomen pelvis with IV dye/contrast.    Rash appeared the following day but he denies any associated itching, burning, shortness of breath, chest pain or palpitations.      Subjective        I have reviewed and the following portions of the patient's history were updated as appropriate: past family history, past medical history, past social history, past surgical history and problem list.    Medications:     Current Outpatient Medications:   •  aspirin 81 MG EC tablet, Take 81 mg by mouth Daily., Disp: , Rfl:   •  atorvastatin (LIPITOR) 40 MG tablet, TAKE ONE TABLET BY MOUTH DAILY, Disp: 30 tablet, Rfl: 5  •  celecoxib (CeleBREX) 200 MG capsule, Take 1 capsule by mouth Daily., Disp: 30 capsule, Rfl: 2  •  chlorthalidone (HYGROTON) 25 MG tablet, TAKE ONE TABLET BY MOUTH DAILY, Disp: 90 tablet, Rfl: 1  •  cyclobenzaprine (FLEXERIL) 10 MG tablet, Take 1 tablet by mouth At Night As Needed for Muscle Spasms., Disp: 20 tablet, Rfl: 0  •  esomeprazole (NexIUM) 40 MG packet, Take 1 capsule by mouth Every Night., Disp: , Rfl:   •  ferrous sulfate 325 (65 FE) MG EC tablet, ONE BY MOUTH TWICE A DAY (Patient taking differently: Take 325 mg by mouth Daily.), Disp: 60 tablet, Rfl: 5  •  levocetirizine (XYZAL) 5 MG tablet, Take 5 mg  "by mouth Every Evening., Disp: , Rfl:   •  MAGNESIUM OXIDE PO, Take 250 mg by mouth Daily., Disp: , Rfl:   •  metoclopramide (REGLAN) 10 MG tablet, TAKE ONE TABLET BY MOUTH TWICE A DAY (Patient taking differently: Take 10 mg by mouth Daily.), Disp: 60 tablet, Rfl: 5  •  potassium chloride (KLOR-CON) 20 MEQ CR tablet, Take 1 tablet by mouth 2 (Two) Times a Day., Disp: 60 tablet, Rfl: 5  •  RA VITAMIN B-12 TR 1000 MCG tablet controlled-release, take 1 tablet by mouth once daily, Disp: 90 each, Rfl: 3  •  TURMERIC PO, Take 1 tablet by mouth Daily., Disp: , Rfl:   •  vitamin D (ERGOCALCIFEROL) 1.25 MG (84064 UT) capsule capsule, TAKE ONE CAPSULE BY MOUTH ONCE WEEKLY **TAKE AT THE END OF EVERY WEEK**, Disp: 4 capsule, Rfl: 5  •  predniSONE (DELTASONE) 10 MG tablet, Take 6 tablets on day 1, 5 tablets day 2, 4 tablets day 3, 3 tablets day 4, 2 tablets day 5, and 1 tablet day 6, Disp: 21 tablet, Rfl: 0    Allergies:   Allergies   Allergen Reactions   • Sulfa Antibiotics Unknown (See Comments)     Pt unaware of this allergy   • Contrast Dye Rash       Objective     Physical Exam:  Vital Signs:   Vitals:    08/04/22 1020   BP: 122/76   BP Location: Left arm   Patient Position: Sitting   Cuff Size: Large Adult   Pulse: 92   Temp: 97.8 °F (36.6 °C)   TempSrc: Temporal   Weight: 103 kg (227 lb)   Height: 177.8 cm (70\")   PainSc: 0-No pain     Body mass index is 32.57 kg/m².     Physical Exam  Vitals and nursing note reviewed.   Constitutional:       General: He is not in acute distress.  HENT:      Head: Normocephalic and atraumatic.   Cardiovascular:      Rate and Rhythm: Normal rate and regular rhythm.   Pulmonary:      Effort: Pulmonary effort is normal. No respiratory distress.   Skin:     Findings: Rash present.      Comments: Diffuse petechial erythematous rash of bilateral lower extremities extending from knee down to the foot.  Upper extremities with only bilateral forearms involved   Neurological:      General: No focal " deficit present.      Mental Status: He is alert.   Psychiatric:         Mood and Affect: Mood normal.         Thought Content: Thought content normal.         Judgment: Judgment normal.         Assessment / Plan      Assessment/Plan:   Problems Addressed This Visit    ICD-10-CM ICD-9-CM   1. Dermatitis  L30.9 692.9      Dermatitis  -Suspect likely secondary to contrast dye  -Prednisone 10 mg Dosepak x6 days  -Follow-up if symptoms worsen or persist    Recent unexpected weight loss  -Discussed CT of abdomen pelvis results with patient.  Noted fatty liver otherwise unremarkable  -Keep scheduled appointment with Dr. Harry    Plan of care reviewed with patient at the conclusion of today's visit. Education was provided regarding diagnosis and management.  Patient verbalizes understanding of and agreement with management plan.      Follow Up:   Return if symptoms worsen or fail to improve, for Next scheduled follow up.        SY Ramos  [facility] [address] [facility]    Please note that portions of this note were completed with a voice recognition program.

## 2022-08-18 ENCOUNTER — OFFICE VISIT (OUTPATIENT)
Dept: GASTROENTEROLOGY | Facility: CLINIC | Age: 62
End: 2022-08-18

## 2022-08-18 DIAGNOSIS — K76.0 FATTY LIVER: Primary | ICD-10-CM

## 2022-08-18 DIAGNOSIS — R63.4 WEIGHT LOSS: ICD-10-CM

## 2022-08-18 DIAGNOSIS — R74.8 ELEVATED LIVER ENZYMES: ICD-10-CM

## 2022-08-18 DIAGNOSIS — K21.9 GASTROESOPHAGEAL REFLUX DISEASE, UNSPECIFIED WHETHER ESOPHAGITIS PRESENT: ICD-10-CM

## 2022-08-18 PROCEDURE — 99443 PR PHYS/QHP TELEPHONE EVALUATION 21-30 MIN: CPT | Performed by: PHYSICIAN ASSISTANT

## 2022-08-18 NOTE — PROGRESS NOTES
Follow Up      Patient Name: Bala Staley III  : 1960   MRN: [id number]     Chief Complaint:  No chief complaint on file.      History of Present Illness: Bala Staley III is a 62 y.o. male who is here today for follow up on fatty liver, weight loss.    CT A/P w/wo contrast 22: No acute intraabdominal or pelvic process. Hepatic steatosis.     CMP recently yields total bili 0.9, AST 66, ALT 80, alk phos 99. Pt notes about 15lb weight loss over the last month or so, unintentional. He denies changes to medications, diet, exercise. He does note increased stress with settling his father's estate, who passed in April.     Last EGD . Unable to open procedure report due to error in Epic. GERD sx well controlled with nexium 40mg daily.     Patient denies associated night sweats, fever, chills, abdominal pain, indigestion, nausea, vomiting, diarrhea, constipation, hematemesis, dysphagia, hematochezia, melena, bloating, dysuria, jaundice or bruising.    Patient denies personal or FHx of PUD, H Pylori, gastritis, pancreatitis, colitis, Celiac disease, UC, Crohn's disease, IBS, colon or gastric cancers. Pt denies tobacco, illicit substance or NSAID use. He admits to drinking a few cocktails each evening.     Subjective      Review of Systems:   Review of Systems   Constitutional: Positive for unexpected weight loss. Negative for appetite change, chills, diaphoresis, fatigue, fever and unexpected weight gain.   HENT: Negative for drooling, facial swelling, mouth sores, nosebleeds, rhinorrhea, sore throat, swollen glands, tinnitus and trouble swallowing.    Eyes: Negative.    Respiratory: Negative for choking, chest tightness and shortness of breath.    Gastrointestinal: Negative for abdominal pain, anal bleeding, blood in stool, constipation, diarrhea, nausea, vomiting, GERD and indigestion.   Endocrine: Negative.    Genitourinary: Negative for dysuria, flank pain and hematuria.   Musculoskeletal:  Negative for arthralgias, back pain, myalgias and neck pain.   Skin: Negative for color change, dry skin, pallor and bruise.   Neurological: Negative for dizziness, tremors, syncope, weakness and numbness.   Psychiatric/Behavioral: Negative for decreased concentration, hallucinations and self-injury. The patient is not nervous/anxious.    All other systems reviewed and are negative.      Medications:     Current Outpatient Medications:   •  aspirin 81 MG EC tablet, Take 81 mg by mouth Daily., Disp: , Rfl:   •  atorvastatin (LIPITOR) 40 MG tablet, TAKE ONE TABLET BY MOUTH DAILY, Disp: 30 tablet, Rfl: 5  •  celecoxib (CeleBREX) 200 MG capsule, Take 1 capsule by mouth Daily., Disp: 30 capsule, Rfl: 2  •  chlorthalidone (HYGROTON) 25 MG tablet, TAKE ONE TABLET BY MOUTH DAILY, Disp: 90 tablet, Rfl: 1  •  cyclobenzaprine (FLEXERIL) 10 MG tablet, Take 1 tablet by mouth At Night As Needed for Muscle Spasms., Disp: 20 tablet, Rfl: 0  •  esomeprazole (NexIUM) 40 MG packet, Take 1 capsule by mouth Every Night., Disp: , Rfl:   •  ferrous sulfate 325 (65 FE) MG EC tablet, ONE BY MOUTH TWICE A DAY (Patient taking differently: Take 325 mg by mouth Daily.), Disp: 60 tablet, Rfl: 5  •  levocetirizine (XYZAL) 5 MG tablet, Take 5 mg by mouth Every Evening., Disp: , Rfl:   •  MAGNESIUM OXIDE PO, Take 250 mg by mouth Daily., Disp: , Rfl:   •  metoclopramide (REGLAN) 10 MG tablet, TAKE ONE TABLET BY MOUTH TWICE A DAY (Patient taking differently: Take 10 mg by mouth Daily.), Disp: 60 tablet, Rfl: 5  •  potassium chloride (KLOR-CON) 20 MEQ CR tablet, Take 1 tablet by mouth 2 (Two) Times a Day., Disp: 60 tablet, Rfl: 5  •  predniSONE (DELTASONE) 10 MG tablet, Take 6 tablets on day 1, 5 tablets day 2, 4 tablets day 3, 3 tablets day 4, 2 tablets day 5, and 1 tablet day 6, Disp: 21 tablet, Rfl: 0  •  RA VITAMIN B-12 TR 1000 MCG tablet controlled-release, take 1 tablet by mouth once daily, Disp: 90 each, Rfl: 3  •  TURMERIC PO, Take 1 tablet by  mouth Daily., Disp: , Rfl:   •  vitamin D (ERGOCALCIFEROL) 1.25 MG (16365 UT) capsule capsule, TAKE ONE CAPSULE BY MOUTH ONCE WEEKLY **TAKE AT THE END OF EVERY WEEK**, Disp: 4 capsule, Rfl: 5    Allergies:   Allergies   Allergen Reactions   • Sulfa Antibiotics Unknown (See Comments)     Pt unaware of this allergy   • Contrast Dye Rash       Social History:   Social History     Socioeconomic History   • Marital status:    Tobacco Use   • Smoking status: Never Smoker   • Smokeless tobacco: Former User     Types: Chew     Quit date: 2002   Substance and Sexual Activity   • Alcohol use: Yes     Alcohol/week: 2.0 standard drinks     Types: 2 Shots of liquor per week     Comment: per day, [place]    • Drug use: No   • Sexual activity: Yes     Partners: Female        Surgical History:   Past Surgical History:   Procedure Laterality Date   • APPENDECTOMY     • CARDIAC CATHETERIZATION  2011   • ELBOW ARTHROPLASTY      Right    • ENDOSCOPY  2011   • ENDOSCOPY  2008    with biopsy   • ESOPHAGEAL MOTILITY STUDY N/A 6/27/2019    Procedure: MANOMETRY;  Surgeon: Mark Lowery MD;  Location: [facility] ENDOSCOPY;  Service: Gastroenterology   • FOOT SURGERY      left foot         Medical History:   Past Medical History:   Diagnosis Date   • Anemia     iron deficiency   • Chronic low back pain 3/14/2018   • Colonic polyp    • ED (erectile dysfunction)    • Esophagitis    • Essential hypertension 3/14/2018   • Gastroesophageal reflux disease without esophagitis 3/14/2018   • Hernia of abdominal cavity    • Hyperlipidemia    • Reactive depression 3/14/2018   • Tennis elbow    • Vitamin D deficiency 3/14/2018        Objective     Physical Exam:  Vital Signs: There were no vitals filed for this visit.  There is no height or weight on file to calculate BMI.     Physical Exam  Audio visit    Assessment / Plan      Assessment/Plan:   There are no diagnoses linked to this encounter.     You have chosen to receive care through a  telephone visit. Do you consent to use a telephone visit for your medical care today? Yes    Fatty liver  Elevated liver enzymes  Weight loss  GERD   - continue esomeprazole 40mg daily   - pt given GERD diet instructions, advised to avoid GI irritants such as caffeine, carbonation, EtOH, tobacco, chocolate, peppermint, acid-based foods.   - pt advised on lifestyle modifications for fatty liver including limiting high fat / high carb foods, limiting EtOH and getting regular exercise   - obtain CBC, CMP, PT/INR, DUPREE fibrosure, hepatitis panel, Hep A/B serologies, autoimmune liver disease workup, iron profile, celiac disease profile   - previous labs and imaging reviewed   - schedule for EGD   - follow up in clinic after completion of above studies   - call clinic at any time for questions or new / worsened sx      Follow Up:   Return in about 6 weeks (around 9/29/2022).    Plan of care reviewed with the patient at the conclusion of today's visit.  Education was provided regarding diagnosis, management, and any prescribed or recommended OTC medications.  Patient verbalized understanding of and agreement with management plan.     NOTE TO PATIENT: The 21st Century Cures Act makes medical notes like these available to patients in the interest of transparency. However, be advised this is a medical document. It is intended as peer to peer communication. It is written in medical language and may contain abbreviations or verbiage that are unfamiliar. It may appear blunt or direct. Medical documents are intended to carry relevant information, facts as evident, and the clinical opinion of the practitioner.     Time Statement:   Discussed plan of care in detail with patient today. Patient verbally understands and agrees. I have spent 45 minutes reviewing available diagnostics, obtaining history, examining the patient, developing a treatment plan, and educating the patient on disease process and plan of care.     Alba  TRACI Gomes   [facility]

## 2022-08-31 RX ORDER — POTASSIUM CHLORIDE 20 MEQ/1
20 TABLET, EXTENDED RELEASE ORAL 2 TIMES DAILY
Qty: 60 TABLET | Refills: 5 | Status: SHIPPED | OUTPATIENT
Start: 2022-08-31 | End: 2023-01-09 | Stop reason: SDUPTHER

## 2022-08-31 RX ORDER — ATORVASTATIN CALCIUM 40 MG/1
40 TABLET, FILM COATED ORAL DAILY
Qty: 30 TABLET | Refills: 5 | Status: SHIPPED | OUTPATIENT
Start: 2022-08-31 | End: 2022-10-03

## 2022-08-31 RX ORDER — ERGOCALCIFEROL 1.25 MG/1
50000 CAPSULE ORAL
Qty: 4 CAPSULE | Refills: 5 | Status: SHIPPED | OUTPATIENT
Start: 2022-08-31 | End: 2023-03-07 | Stop reason: SDUPTHER

## 2022-08-31 NOTE — TELEPHONE ENCOUNTER
Caller: Bala Staley III    Relationship: Self    Best call back number: [phone]    Requested Prescriptions:   Requested Prescriptions     Pending Prescriptions Disp Refills   • atorvastatin (LIPITOR) 40 MG tablet 30 tablet 5     Sig: Take 1 tablet by mouth Daily.   • vitamin D (ERGOCALCIFEROL) 1.25 MG (80941 UT) capsule capsule 4 capsule 5   • potassium chloride (KLOR-CON) 20 MEQ CR tablet 60 tablet 5     Sig: Take 1 tablet by mouth 2 (Two) Times a Day.        Pharmacy where request should be sent: [facility] [address] [place] [address] [facility] - [phone]  - [phone] FX     Additional details provided by patient: PATIENT WAS TOLD TO CONTACT THE OFFICE ABOUT THE ATORVASTATIN AND THE VITAMIN D.HE IS OUT OF THESE TWO MEDICATIONS.    AND THE POTASSIUM IS SUPPOSED TO BE TAKEN 3X DAILY. SO HE IS RUNNING OUT. PLEASE SEND AN UPDATE PRESCRIPTION FOR THAT ONE.    PLEASE CALL WHEN THIS HAS BEEN SENT IN    Does the patient have less than a 3 day supply:  [x] Yes  [] No    Natalia Lagos, PCT   08/31/22 13:21 EDT

## 2022-08-31 NOTE — TELEPHONE ENCOUNTER
Please see note below - patient states he's taking the potassium TID - chart says BID.     Rx Refill Note  Requested Prescriptions     Pending Prescriptions Disp Refills   • atorvastatin (LIPITOR) 40 MG tablet 30 tablet 5     Sig: Take 1 tablet by mouth Daily.   • vitamin D (ERGOCALCIFEROL) 1.25 MG (92661 UT) capsule capsule 4 capsule 5     Sig: Take 1 capsule by mouth Every 30 (Thirty) Days.   • potassium chloride (KLOR-CON) 20 MEQ CR tablet 60 tablet 5     Sig: Take 1 tablet by mouth 2 (Two) Times a Day.        Last office visit with prescribing clinician: 7/18/2022      Next office visit with prescribing clinician: 9/9/2022            Laura Maharaj RN  08/31/22, 13:33 EDT

## 2022-09-09 ENCOUNTER — OFFICE VISIT (OUTPATIENT)
Dept: INTERNAL MEDICINE | Facility: CLINIC | Age: 62
End: 2022-09-09

## 2022-09-09 ENCOUNTER — LAB (OUTPATIENT)
Dept: LAB | Facility: HOSPITAL | Age: 62
End: 2022-09-09

## 2022-09-09 VITALS
HEART RATE: 96 BPM | SYSTOLIC BLOOD PRESSURE: 126 MMHG | DIASTOLIC BLOOD PRESSURE: 86 MMHG | HEIGHT: 70 IN | WEIGHT: 223.8 LBS | TEMPERATURE: 98.2 F | BODY MASS INDEX: 32.04 KG/M2

## 2022-09-09 DIAGNOSIS — K21.9 GASTROESOPHAGEAL REFLUX DISEASE, UNSPECIFIED WHETHER ESOPHAGITIS PRESENT: ICD-10-CM

## 2022-09-09 DIAGNOSIS — K76.0 FATTY LIVER: ICD-10-CM

## 2022-09-09 DIAGNOSIS — R63.4 WEIGHT LOSS: ICD-10-CM

## 2022-09-09 DIAGNOSIS — E78.2 MIXED HYPERLIPIDEMIA: ICD-10-CM

## 2022-09-09 DIAGNOSIS — I10 ESSENTIAL HYPERTENSION: Primary | ICD-10-CM

## 2022-09-09 DIAGNOSIS — K76.0 HEPATIC STEATOSIS: ICD-10-CM

## 2022-09-09 DIAGNOSIS — R74.8 ELEVATED LIVER ENZYMES: ICD-10-CM

## 2022-09-09 LAB
ALBUMIN SERPL-MCNC: 4.1 G/DL (ref 3.5–5.2)
ALBUMIN/GLOB SERPL: 1.3 G/DL
ALP SERPL-CCNC: 85 U/L (ref 39–117)
ALT SERPL W P-5'-P-CCNC: 62 U/L (ref 1–41)
ANION GAP SERPL CALCULATED.3IONS-SCNC: 16.6 MMOL/L (ref 5–15)
AST SERPL-CCNC: 60 U/L (ref 1–40)
BASOPHILS # BLD AUTO: 0.04 10*3/MM3 (ref 0–0.2)
BASOPHILS NFR BLD AUTO: 0.9 % (ref 0–1.5)
BILIRUB SERPL-MCNC: 0.9 MG/DL (ref 0–1.2)
BUN SERPL-MCNC: 9 MG/DL (ref 8–23)
BUN/CREAT SERPL: 10.2 (ref 7–25)
CALCIUM SPEC-SCNC: 10 MG/DL (ref 8.6–10.5)
CHLORIDE SERPL-SCNC: 99 MMOL/L (ref 98–107)
CO2 SERPL-SCNC: 23.4 MMOL/L (ref 22–29)
CREAT SERPL-MCNC: 0.88 MG/DL (ref 0.76–1.27)
DEPRECATED RDW RBC AUTO: 56.2 FL (ref 37–54)
EGFRCR SERPLBLD CKD-EPI 2021: 97.2 ML/MIN/1.73
EOSINOPHIL # BLD AUTO: 0.06 10*3/MM3 (ref 0–0.4)
EOSINOPHIL NFR BLD AUTO: 1.4 % (ref 0.3–6.2)
ERYTHROCYTE [DISTWIDTH] IN BLOOD BY AUTOMATED COUNT: 15.2 % (ref 12.3–15.4)
GLOBULIN UR ELPH-MCNC: 3.2 GM/DL
GLUCOSE SERPL-MCNC: 86 MG/DL (ref 65–99)
HAV IGM SERPL QL IA: NORMAL
HBV CORE IGM SERPL QL IA: NORMAL
HBV SURFACE AB SER RIA-ACNC: REACTIVE
HBV SURFACE AG SERPL QL IA: NORMAL
HCT VFR BLD AUTO: 48.1 % (ref 37.5–51)
HCV AB SER DONR QL: NORMAL
HGB BLD-MCNC: 17.6 G/DL (ref 13–17.7)
IMM GRANULOCYTES # BLD AUTO: 0.01 10*3/MM3 (ref 0–0.05)
IMM GRANULOCYTES NFR BLD AUTO: 0.2 % (ref 0–0.5)
INR PPP: 1.01 (ref 0.84–1.13)
IRON 24H UR-MRATE: 106 MCG/DL (ref 59–158)
IRON SATN MFR SERPL: 35 % (ref 20–50)
LYMPHOCYTES # BLD AUTO: 0.94 10*3/MM3 (ref 0.7–3.1)
LYMPHOCYTES NFR BLD AUTO: 22.2 % (ref 19.6–45.3)
MCH RBC QN AUTO: 37.9 PG (ref 26.6–33)
MCHC RBC AUTO-ENTMCNC: 36.6 G/DL (ref 31.5–35.7)
MCV RBC AUTO: 103.7 FL (ref 79–97)
MONOCYTES # BLD AUTO: 0.56 10*3/MM3 (ref 0.1–0.9)
MONOCYTES NFR BLD AUTO: 13.2 % (ref 5–12)
NEUTROPHILS NFR BLD AUTO: 2.62 10*3/MM3 (ref 1.7–7)
NEUTROPHILS NFR BLD AUTO: 62.1 % (ref 42.7–76)
NRBC BLD AUTO-RTO: 0 /100 WBC (ref 0–0.2)
PLATELET # BLD AUTO: 187 10*3/MM3 (ref 140–450)
PMV BLD AUTO: 10.4 FL (ref 6–12)
POTASSIUM SERPL-SCNC: 3.1 MMOL/L (ref 3.5–5.2)
PROT SERPL-MCNC: 7.3 G/DL (ref 6–8.5)
PROTHROMBIN TIME: 13.2 SECONDS (ref 11.4–14.4)
RBC # BLD AUTO: 4.64 10*6/MM3 (ref 4.14–5.8)
SODIUM SERPL-SCNC: 139 MMOL/L (ref 136–145)
TIBC SERPL-MCNC: 301 MCG/DL (ref 298–536)
TRANSFERRIN SERPL-MCNC: 202 MG/DL (ref 200–360)
WBC NRBC COR # BLD: 4.23 10*3/MM3 (ref 3.4–10.8)

## 2022-09-09 PROCEDURE — 82172 ASSAY OF APOLIPOPROTEIN: CPT

## 2022-09-09 PROCEDURE — 83540 ASSAY OF IRON: CPT

## 2022-09-09 PROCEDURE — 83010 ASSAY OF HAPTOGLOBIN QUANT: CPT

## 2022-09-09 PROCEDURE — 80074 ACUTE HEPATITIS PANEL: CPT

## 2022-09-09 PROCEDURE — 83883 ASSAY NEPHELOMETRY NOT SPEC: CPT

## 2022-09-09 PROCEDURE — 86231 EMA EACH IG CLASS: CPT

## 2022-09-09 PROCEDURE — 86708 HEPATITIS A ANTIBODY: CPT

## 2022-09-09 PROCEDURE — 86038 ANTINUCLEAR ANTIBODIES: CPT

## 2022-09-09 PROCEDURE — 36415 COLL VENOUS BLD VENIPUNCTURE: CPT | Performed by: PHYSICIAN ASSISTANT

## 2022-09-09 PROCEDURE — 85025 COMPLETE CBC W/AUTO DIFF WBC: CPT

## 2022-09-09 PROCEDURE — 82784 ASSAY IGA/IGD/IGG/IGM EACH: CPT

## 2022-09-09 PROCEDURE — 85610 PROTHROMBIN TIME: CPT | Performed by: PHYSICIAN ASSISTANT

## 2022-09-09 PROCEDURE — 86381 MITOCHONDRIAL ANTIBODY EACH: CPT

## 2022-09-09 PROCEDURE — 84466 ASSAY OF TRANSFERRIN: CPT

## 2022-09-09 PROCEDURE — 82465 ASSAY BLD/SERUM CHOLESTEROL: CPT

## 2022-09-09 PROCEDURE — 86364 TISS TRNSGLTMNASE EA IG CLAS: CPT

## 2022-09-09 PROCEDURE — 86706 HEP B SURFACE ANTIBODY: CPT

## 2022-09-09 PROCEDURE — 86015 ACTIN ANTIBODY EACH: CPT

## 2022-09-09 PROCEDURE — 80053 COMPREHEN METABOLIC PANEL: CPT

## 2022-09-09 PROCEDURE — 99214 OFFICE O/P EST MOD 30 MIN: CPT | Performed by: INTERNAL MEDICINE

## 2022-09-09 PROCEDURE — 82977 ASSAY OF GGT: CPT

## 2022-09-09 PROCEDURE — 84478 ASSAY OF TRIGLYCERIDES: CPT

## 2022-09-09 NOTE — PROGRESS NOTES
[facility]     Bala Staley III  1960   [id number]      Patient Care Team:  Wilfred Kim MD as PCP - General (Internal Medicine)    Chief Complaint::   Chief Complaint   Patient presents with   • Hypertension   • Hyperlipidemia        HPI  Bala Staley is a 61-year-old male who presents for follow-up of hypertension, hyperlipidemia, vitamin D deficiency, and hepatic steatosis.    Weight loss  He mentions that since his last visit, he has lost approximately 5 pounds. He notes his weight this morning was 218 pounds and he has never gone below 216 pounds on his scale. He notes not getting much exercise.    Hypertension  He acknowledges that his blood pressure is well controlled today.    Hepatic steatosis  He mentions getting laboratory work up for his liver.      Chronic Conditions:  hypertension, hyperlipidemia, vitamin D deficiency, and hepatic steatosis.      Patient Active Problem List   Diagnosis   • Essential hypertension   • Mixed hyperlipidemia   • Class 2 obesity due to excess calories without serious comorbidity in adult   • Reactive depression   • Gastroesophageal reflux disease without esophagitis   • Vitamin D deficiency   • Chronic low back pain   • Dyspnea on exertion   • Syncope, tussive   • Hypokalemia   • Cough syncope   • Allergic rhinitis   • Cough   • Hypercholesterolemia   • Iron deficiency anemia   • Vasovagal syncope   • Hernia of abdominal cavity   • Right sided sciatica   • Hepatic steatosis        Past Medical History:   Diagnosis Date   • Anemia     iron deficiency   • Chronic low back pain 3/14/2018   • Colonic polyp    • ED (erectile dysfunction)    • Esophagitis    • Essential hypertension 3/14/2018   • Gastroesophageal reflux disease without esophagitis 3/14/2018   • Hernia of abdominal cavity    • Hyperlipidemia    • Reactive depression 3/14/2018   • Tennis elbow    • Vitamin D deficiency 3/14/2018       Past Surgical History:   Procedure Laterality Date    • APPENDECTOMY     • CARDIAC CATHETERIZATION  2011   • ELBOW ARTHROPLASTY      Right    • ENDOSCOPY  2011   • ENDOSCOPY  2008    with biopsy   • ESOPHAGEAL MOTILITY STUDY N/A 6/27/2019    Procedure: MANOMETRY;  Surgeon: Mark Lowery MD;  Location: [facility] ENDOSCOPY;  Service: Gastroenterology   • FOOT SURGERY      left foot        Family History   Problem Relation Age of Onset   • Alzheimer's disease Mother    • Heart disease Father    • Heart attack Father    • Colon cancer Father    • Coronary artery disease Father    • No Known Problems Sister    • Cancer Paternal Grandmother    • Lung cancer Paternal Grandmother        Social History     Socioeconomic History   • Marital status:    Tobacco Use   • Smoking status: Never Smoker   • Smokeless tobacco: Former User     Types: Chew     Quit date: 2002   Substance and Sexual Activity   • Alcohol use: Yes     Alcohol/week: 2.0 standard drinks     Types: 2 Shots of liquor per week     Comment: per day, [place]    • Drug use: No   • Sexual activity: Yes     Partners: Female       Allergies   Allergen Reactions   • Sulfa Antibiotics Unknown (See Comments)     Pt unaware of this allergy   • Contrast Dye Rash         Current Outpatient Medications:   •  aspirin 81 MG EC tablet, Take 81 mg by mouth Daily., Disp: , Rfl:   •  atorvastatin (LIPITOR) 40 MG tablet, Take 1 tablet by mouth Daily., Disp: 30 tablet, Rfl: 5  •  chlorthalidone (HYGROTON) 25 MG tablet, TAKE ONE TABLET BY MOUTH DAILY, Disp: 90 tablet, Rfl: 1  •  esomeprazole (NexIUM) 40 MG packet, Take 1 capsule by mouth Every Night., Disp: , Rfl:   •  ferrous sulfate 325 (65 FE) MG EC tablet, ONE BY MOUTH TWICE A DAY (Patient taking differently: Take 325 mg by mouth Daily.), Disp: 60 tablet, Rfl: 5  •  levocetirizine (XYZAL) 5 MG tablet, Take 5 mg by mouth Every Evening., Disp: , Rfl:   •  MAGNESIUM OXIDE PO, Take 250 mg by mouth Daily., Disp: , Rfl:   •  metoclopramide (REGLAN) 10 MG tablet, TAKE ONE  "TABLET BY MOUTH TWICE A DAY (Patient taking differently: Take 10 mg by mouth Daily.), Disp: 60 tablet, Rfl: 5  •  potassium chloride (KLOR-CON) 20 MEQ CR tablet, Take 1 tablet by mouth 2 (Two) Times a Day. (Patient taking differently: 60 mEq Daily.), Disp: 60 tablet, Rfl: 5  •  RA VITAMIN B-12 TR 1000 MCG tablet controlled-release, take 1 tablet by mouth once daily, Disp: 90 each, Rfl: 3  •  TURMERIC PO, Take 1 tablet by mouth Daily., Disp: , Rfl:   •  vitamin D (ERGOCALCIFEROL) 1.25 MG (52622 UT) capsule capsule, Take 1 capsule by mouth Every 30 (Thirty) Days. (Patient taking differently: Take 50,000 Units by mouth Every 7 (Seven) Days.), Disp: 4 capsule, Rfl: 5    Review of Systems   Constitutional: Negative for chills, fatigue and fever.   HENT: Negative for congestion, ear pain and sinus pressure.    Respiratory: Negative for cough, chest tightness, shortness of breath and wheezing.    Cardiovascular: Negative for chest pain and palpitations.   Gastrointestinal: Negative for abdominal pain, blood in stool and constipation.   Skin: Negative for color change.   Allergic/Immunologic: Negative for environmental allergies.   Neurological: Negative for dizziness, speech difficulty and headache.   Psychiatric/Behavioral: Negative for decreased concentration. The patient is not nervous/anxious.         Vital Signs  Vitals:    09/09/22 1011   BP: 126/86   BP Location: Left arm   Patient Position: Sitting   Cuff Size: Large Adult   Pulse: 96   Temp: 98.2 °F (36.8 °C)   Weight: 102 kg (223 lb 12.8 oz)   Height: 177.8 cm (70\")   PainSc: 0-No pain       Physical Exam  Vitals and nursing note reviewed.   Constitutional:       General: He is not in acute distress.     Appearance: He is well-developed. He is not diaphoretic.   HENT:      Head: Normocephalic and atraumatic.   Eyes:      Conjunctiva/sclera: Conjunctivae normal.   Pulmonary:      Effort: Pulmonary effort is normal. No respiratory distress.   Skin:     General: " Skin is warm.      Capillary Refill: Capillary refill takes less than 2 seconds.   Neurological:      Mental Status: He is alert.          Procedures    ACE III MINI             Assessment/Plan:    Diagnoses and all orders for this visit:    1. Essential hypertension (Primary)    2. Mixed hyperlipidemia    3. Hepatic steatosis      1. Hypertension  - Blood pressure is well controlled.    2. Hyperlipidemia  - He will continue atorvastatin and healthy diet.    3. Hepatic steatosis  - He is in the midst of an extensive work-up looking an abnormal liver function test in the setting of unexplained weight loss. His weight appears to have stabilized and he feels much better. He will follow up with gastrointestinal specialist as scheduled.     He will follow up with me in 6 months.      Plan of care reviewed with patient at the conclusion of today's visit. Education was provided regarding diagnosis, management, and any prescribed or recommended OTC medications.Patient verbalizes understanding of and agreement with management plan.         Transcribed from ambient dictation for Wilfred Kim MD by ELAINE LEBLANC.  09/09/22   11:23 EDT    Patient verbalized consent to the visit recording.

## 2022-09-10 LAB
HAV AB SER QL IA: POSITIVE
MITOCHONDRIA M2 IGG SER-ACNC: <20 UNITS (ref 0–20)
SMA IGG SER-ACNC: 6 UNITS (ref 0–19)

## 2022-09-12 LAB
ANA SER QL: NEGATIVE
ENDOMYSIUM IGA SER QL: NEGATIVE
IGA SERPL-MCNC: 192 MG/DL (ref 61–437)
TTG IGA SER-ACNC: <2 U/ML (ref 0–3)

## 2022-09-13 LAB
A2 MACROGLOB SERPL-MCNC: 165 MG/DL (ref 110–276)
ALT SERPL W P-5'-P-CCNC: 72 IU/L (ref 0–55)
APO A-I SERPL-MCNC: 217 MG/DL (ref 101–178)
AST SERPL W P-5'-P-CCNC: 76 IU/L (ref 0–40)
BILIRUB SERPL-MCNC: 0.8 MG/DL (ref 0–1.2)
CHOLEST SERPL-MCNC: 209 MG/DL (ref 100–199)
FIBROSIS SCORING:: ABNORMAL
FIBROSIS STAGE SERPL QL: ABNORMAL
GGT SERPL-CCNC: 211 IU/L (ref 0–65)
GLUCOSE SERPL-MCNC: 90 MG/DL (ref 65–99)
HAPTOGLOB SERPL-MCNC: 62 MG/DL (ref 32–363)
INTERPRETATIONS: (REFERENCE): ABNORMAL
LABORATORY COMMENT REPORT: ABNORMAL
LIVER FIBR SCORE SERPL CALC.FIBROSURE: 0.39 (ref 0–0.21)
NASH SCORING (REFERENCE): ABNORMAL
NECROINFLAMMATORY ACT GRADE SERPL QL: ABNORMAL
NECROINFLAMMATORY ACT SCORE SERPL: 0.5
SERVICE CMNT-IMP: ABNORMAL
STEATOSIS GRADE (REFERENCE): ABNORMAL
STEATOSIS GRADING (REFERENCE): ABNORMAL
STEATOSIS SCORE (REFERENCE): 0.62 (ref 0–0.3)
TRIGL SERPL-MCNC: 100 MG/DL (ref 0–149)

## 2022-09-14 NOTE — PROGRESS NOTES
Please let Mueller know that his labs reveal the following:  - fibrosure reveals F1/F2 fibrosis, S2 steatosis  - immunity to Hep A / B  - PILAR negative, autoimmune liver disease workup negative  - celiac panel negative  - CBC essentially normal; he does have macrocytic indices, consistent with EtOH abuse  - CMP essentially normal; LFTs persistently elevated but stable  - iron profile normal    Thanks!  Alba Gomes PA-C

## 2022-09-19 ENCOUNTER — LAB (OUTPATIENT)
Dept: LAB | Facility: HOSPITAL | Age: 62
End: 2022-09-19

## 2022-09-19 ENCOUNTER — OFFICE VISIT (OUTPATIENT)
Dept: INTERNAL MEDICINE | Facility: CLINIC | Age: 62
End: 2022-09-19

## 2022-09-19 ENCOUNTER — TELEPHONE (OUTPATIENT)
Dept: INTERNAL MEDICINE | Facility: CLINIC | Age: 62
End: 2022-09-19

## 2022-09-19 VITALS
HEIGHT: 70 IN | SYSTOLIC BLOOD PRESSURE: 122 MMHG | WEIGHT: 228 LBS | BODY MASS INDEX: 32.64 KG/M2 | DIASTOLIC BLOOD PRESSURE: 72 MMHG | HEART RATE: 84 BPM | TEMPERATURE: 98 F

## 2022-09-19 DIAGNOSIS — E87.6 HYPOKALEMIA: ICD-10-CM

## 2022-09-19 DIAGNOSIS — K76.0 HEPATIC STEATOSIS: ICD-10-CM

## 2022-09-19 DIAGNOSIS — R42 DIZZINESS: Primary | ICD-10-CM

## 2022-09-19 DIAGNOSIS — I10 ESSENTIAL HYPERTENSION: ICD-10-CM

## 2022-09-19 PROCEDURE — 99213 OFFICE O/P EST LOW 20 MIN: CPT | Performed by: NURSE PRACTITIONER

## 2022-09-19 PROCEDURE — 80053 COMPREHEN METABOLIC PANEL: CPT

## 2022-09-19 PROCEDURE — 93000 ELECTROCARDIOGRAM COMPLETE: CPT | Performed by: NURSE PRACTITIONER

## 2022-09-19 NOTE — TELEPHONE ENCOUNTER
Caller: Bala Staley III    Relationship: Self    Best call back number: [phone]    What is the best time to reach you: ANYTIME    Who are you requesting to speak with (clinical staff, provider,  specific staff member): PROVIDER OR CLINICAL STAFF    What was the call regarding: PATIENT STATES THAT HE HAS BEEN DIZZY WITH FOGGINESS OVER THE WEEKEND BUT IS CURRENTLY BETTER, PATIENT STATES THAT HE IS TAKING 2 IRON TABLETS AND PATIENT WOULD LIKE TO BE ADVISED    Do you require a callback: YES

## 2022-09-19 NOTE — TELEPHONE ENCOUNTER
Called pt and he is still is having problems. Sched appt this afternoon with Paloma. Also wants to go over lab results

## 2022-09-19 NOTE — PROGRESS NOTES
Follow Up Office Visit      Patient Name: Bala Staley III  : 1960   MRN: [id number]   Care Team: Patient Care Team:  Wilfred Kim MD as PCP - General (Internal Medicine)    Chief Complaint:    Chief Complaint   Patient presents with   • Dizziness          • Extremity Weakness       History of Present Illness: Bala Staley III is a 62 y.o. male with pertinent medical history significant for hyperlipidemia, hypertension, GERD, vitamin D deficiency, obesity, reactive depressive disorder, history of iron deficiency anemia, alcohol use and recent diagnosis of hepatic steatosis with F1/F2 fibrosis.    Presents today for new acute onset of dizziness and muscle weakness.  States symptoms started over the weekend.  Worse with movement.  States he has been trying to hydrate adequately.  Denies any associated syncope, palpitations, chest pain, shortness of breath, fever, chills.      Notes that he monitors his blood pressure at home and reports readings have been stable, not elevated.    Notes that he has had recent work-up with GI regarding his 20+ pound weight loss since .  Noted elevated liver enzymes with positive for F1/F2 fibrosis and hepatic steatosis.  Noted negative mitochondrial antibody, celiac panel, acute hepatitis panel.    Patient is inquiring what these labs mean as he states there has not been any communication with him regarding these results from GI.    Patient notes history of anemia with similar symptoms in the past.  Admits that he increased his dose of iron to twice daily on his own thinking that that was his problem.        Subjective      Review of Systems:   Review of Systems   Respiratory: Negative for cough and shortness of breath.    Cardiovascular: Negative for chest pain, palpitations and leg swelling.   Gastrointestinal: Negative for abdominal pain.   Neurological: Positive for dizziness and weakness.       I have reviewed and the following portions of the patient's  history were updated as appropriate: past family history, past medical history, past social history, past surgical history and problem list.    Medications:     Current Outpatient Medications:   •  aspirin 81 MG EC tablet, Take 81 mg by mouth Daily., Disp: , Rfl:   •  atorvastatin (LIPITOR) 40 MG tablet, Take 1 tablet by mouth Daily., Disp: 30 tablet, Rfl: 5  •  chlorthalidone (HYGROTON) 25 MG tablet, TAKE ONE TABLET BY MOUTH DAILY, Disp: 90 tablet, Rfl: 1  •  esomeprazole (NexIUM) 40 MG packet, Take 1 capsule by mouth Every Night., Disp: , Rfl:   •  ferrous sulfate 325 (65 FE) MG EC tablet, ONE BY MOUTH TWICE A DAY (Patient taking differently: Take 325 mg by mouth 2 (Two) Times a Day.), Disp: 60 tablet, Rfl: 5  •  levocetirizine (XYZAL) 5 MG tablet, Take 5 mg by mouth Every Evening., Disp: , Rfl:   •  MAGNESIUM OXIDE PO, Take 250 mg by mouth Daily., Disp: , Rfl:   •  metoclopramide (REGLAN) 10 MG tablet, TAKE ONE TABLET BY MOUTH TWICE A DAY (Patient taking differently: Take 10 mg by mouth Daily.), Disp: 60 tablet, Rfl: 5  •  potassium chloride (KLOR-CON) 20 MEQ CR tablet, Take 1 tablet by mouth 2 (Two) Times a Day. (Patient taking differently: Take 60 mEq by mouth Daily.), Disp: 60 tablet, Rfl: 5  •  RA VITAMIN B-12 TR 1000 MCG tablet controlled-release, take 1 tablet by mouth once daily, Disp: 90 each, Rfl: 3  •  TURMERIC PO, Take 1 tablet by mouth Daily., Disp: , Rfl:   •  vitamin D (ERGOCALCIFEROL) 1.25 MG (14114 UT) capsule capsule, Take 1 capsule by mouth Every 30 (Thirty) Days., Disp: 4 capsule, Rfl: 5    Allergies:   Allergies   Allergen Reactions   • Sulfa Antibiotics Unknown (See Comments)     Pt unaware of this allergy   • Contrast Dye Rash       Objective     Physical Exam:  Vital Signs:   Vitals:    09/19/22 1422   BP: 122/72   BP Location: Left arm   Patient Position: Sitting   Cuff Size: Large Adult   Pulse: 84   Temp: 98 °F (36.7 °C)   TempSrc: Temporal   Weight: 103 kg (228 lb)   Height: 177.8 cm  "(70\")   PainSc: 0-No pain     Body mass index is 32.71 kg/m².     Physical Exam  Vitals and nursing note reviewed.   Constitutional:       General: He is not in acute distress.     Comments: Patient accompanied by spouse   HENT:      Head: Normocephalic and atraumatic.   Eyes:      General: No scleral icterus.     Conjunctiva/sclera: Conjunctivae normal.   Cardiovascular:      Rate and Rhythm: Normal rate.   Pulmonary:      Effort: Pulmonary effort is normal. No respiratory distress.      Breath sounds: No wheezing.   Musculoskeletal:      Right lower leg: Edema (Trace edema) present.      Left lower leg: Edema (Trace edema) present.   Neurological:      Mental Status: He is alert.   Psychiatric:         Mood and Affect: Mood normal.         Thought Content: Thought content normal.         Judgment: Judgment normal.       ECG 12 Lead    Date/Time: 9/19/2022 3:41 PM  Performed by: Misa Mcdaniel APRN  Authorized by: Misa Mcdaniel APRN   Comparison: not compared with previous ECG   Rhythm: sinus rhythm  Rate: normal  BPM: 71  QRS axis: normal    Clinical impression: normal ECG              Assessment / Plan      Assessment/Plan:   Problems Addressed This Visit    ICD-10-CM ICD-9-CM   1. Hypokalemia  E87.6 276.8   2. Essential hypertension  I10 401.9   3. Hepatic steatosis  K76.0 571.8   4. Dizziness  R42 780.4      Dizziness  Weakness  -Noted recent hypokalemia on labs  -Plan to recheck CMP today  -EKG with NSR, stable       Hypertension  -Plan to continue chlorthalidone 25 mg daily but we have discussed option of discontinuing this with trial of another medication with less potential cause of electrolyte abnormality    Hepatic steatosis/fibrosis  -Long discussion with patient about his recent lab work results  -Strongly encourage patient to discontinue EtOH ASAP  -Also likely factor in electrolyte abnormalities      Plan of care reviewed with patient at the conclusion of today's visit. Education was " provided regarding diagnosis and management.  Patient verbalizes understanding of and agreement with management plan.      Follow Up:   Return for Next scheduled follow up.        SY Ramos  [facility] [address] [facility]    Please note that portions of this note were completed with a voice recognition program.

## 2022-09-20 DIAGNOSIS — E87.6 HYPOKALEMIA: Primary | ICD-10-CM

## 2022-09-20 LAB
ALBUMIN SERPL-MCNC: 4.6 G/DL (ref 3.5–5.2)
ALBUMIN/GLOB SERPL: 1.8 G/DL
ALP SERPL-CCNC: 97 U/L (ref 39–117)
ALT SERPL W P-5'-P-CCNC: 56 U/L (ref 1–41)
ANION GAP SERPL CALCULATED.3IONS-SCNC: 13.1 MMOL/L (ref 5–15)
AST SERPL-CCNC: 44 U/L (ref 1–40)
BILIRUB SERPL-MCNC: 1.2 MG/DL (ref 0–1.2)
BUN SERPL-MCNC: 8 MG/DL (ref 8–23)
BUN/CREAT SERPL: 8.7 (ref 7–25)
CALCIUM SPEC-SCNC: 10.1 MG/DL (ref 8.6–10.5)
CHLORIDE SERPL-SCNC: 96 MMOL/L (ref 98–107)
CO2 SERPL-SCNC: 28.9 MMOL/L (ref 22–29)
CREAT SERPL-MCNC: 0.92 MG/DL (ref 0.76–1.27)
EGFRCR SERPLBLD CKD-EPI 2021: 94.1 ML/MIN/1.73
GLOBULIN UR ELPH-MCNC: 2.6 GM/DL
GLUCOSE SERPL-MCNC: 93 MG/DL (ref 65–99)
POTASSIUM SERPL-SCNC: 3.3 MMOL/L (ref 3.5–5.2)
PROT SERPL-MCNC: 7.2 G/DL (ref 6–8.5)
SODIUM SERPL-SCNC: 138 MMOL/L (ref 136–145)

## 2022-09-20 RX ORDER — LOSARTAN POTASSIUM 25 MG/1
25 TABLET ORAL DAILY
Qty: 30 TABLET | Refills: 0 | Status: SHIPPED | OUTPATIENT
Start: 2022-09-20 | End: 2022-10-19

## 2022-10-03 ENCOUNTER — TELEPHONE (OUTPATIENT)
Dept: INTERNAL MEDICINE | Facility: CLINIC | Age: 62
End: 2022-10-03

## 2022-10-03 ENCOUNTER — LAB (OUTPATIENT)
Dept: LAB | Facility: HOSPITAL | Age: 62
End: 2022-10-03

## 2022-10-03 ENCOUNTER — OFFICE VISIT (OUTPATIENT)
Dept: INTERNAL MEDICINE | Facility: CLINIC | Age: 62
End: 2022-10-03

## 2022-10-03 VITALS
WEIGHT: 228 LBS | HEART RATE: 88 BPM | SYSTOLIC BLOOD PRESSURE: 120 MMHG | BODY MASS INDEX: 32.64 KG/M2 | DIASTOLIC BLOOD PRESSURE: 80 MMHG | OXYGEN SATURATION: 98 % | TEMPERATURE: 97.8 F | HEIGHT: 70 IN

## 2022-10-03 DIAGNOSIS — E87.6 HYPOKALEMIA: ICD-10-CM

## 2022-10-03 DIAGNOSIS — M10.9 ACUTE GOUT INVOLVING TOE OF LEFT FOOT, UNSPECIFIED CAUSE: Primary | ICD-10-CM

## 2022-10-03 DIAGNOSIS — K76.0 HEPATIC STEATOSIS: ICD-10-CM

## 2022-10-03 DIAGNOSIS — M10.9 ACUTE GOUT INVOLVING TOE OF LEFT FOOT, UNSPECIFIED CAUSE: ICD-10-CM

## 2022-10-03 LAB
ALBUMIN SERPL-MCNC: 4.5 G/DL (ref 3.5–5.2)
ALBUMIN/GLOB SERPL: 1.8 G/DL
ALP SERPL-CCNC: 101 U/L (ref 39–117)
ALT SERPL W P-5'-P-CCNC: 92 U/L (ref 1–41)
ANION GAP SERPL CALCULATED.3IONS-SCNC: 9.4 MMOL/L (ref 5–15)
AST SERPL-CCNC: 49 U/L (ref 1–40)
BILIRUB SERPL-MCNC: 1.3 MG/DL (ref 0–1.2)
BUN SERPL-MCNC: 10 MG/DL (ref 8–23)
BUN/CREAT SERPL: 11.4 (ref 7–25)
CALCIUM SPEC-SCNC: 9.5 MG/DL (ref 8.6–10.5)
CHLORIDE SERPL-SCNC: 100 MMOL/L (ref 98–107)
CO2 SERPL-SCNC: 28.6 MMOL/L (ref 22–29)
CREAT SERPL-MCNC: 0.88 MG/DL (ref 0.76–1.27)
EGFRCR SERPLBLD CKD-EPI 2021: 97.2 ML/MIN/1.73
GLOBULIN UR ELPH-MCNC: 2.5 GM/DL
GLUCOSE SERPL-MCNC: 95 MG/DL (ref 65–99)
POTASSIUM SERPL-SCNC: 3.9 MMOL/L (ref 3.5–5.2)
PROT SERPL-MCNC: 7 G/DL (ref 6–8.5)
SODIUM SERPL-SCNC: 138 MMOL/L (ref 136–145)
URATE SERPL-MCNC: 5.2 MG/DL (ref 3.4–7)

## 2022-10-03 PROCEDURE — 99214 OFFICE O/P EST MOD 30 MIN: CPT | Performed by: PHYSICIAN ASSISTANT

## 2022-10-03 PROCEDURE — 84550 ASSAY OF BLOOD/URIC ACID: CPT

## 2022-10-03 PROCEDURE — 80053 COMPREHEN METABOLIC PANEL: CPT

## 2022-10-03 RX ORDER — COLCHICINE 0.6 MG/1
TABLET ORAL
Qty: 3 TABLET | Refills: 0 | Status: SHIPPED | OUTPATIENT
Start: 2022-10-03 | End: 2022-10-12 | Stop reason: SDUPTHER

## 2022-10-03 RX ORDER — PREDNISONE 20 MG/1
20 TABLET ORAL 2 TIMES DAILY
Qty: 10 TABLET | Refills: 0 | Status: SHIPPED | OUTPATIENT
Start: 2022-10-03 | End: 2023-03-09

## 2022-10-03 NOTE — PATIENT INSTRUCTIONS
Gout  Gout is a condition that causes painful swelling of the joints. Gout is a type of inflammation of the joints (arthritis). This condition is caused by having too much uric acid in the body. Uric acid is a chemical that forms when the body breaks down substances called purines. Purines are important for building body proteins.  When the body has too much uric acid, sharp crystals can form and build up inside the joints. This causes pain and swelling. Gout attacks can happen quickly and may be very painful (acute gout). Over time, the attacks can affect more joints and become more frequent (chronic gout). Gout can also cause uric acid to build up under the skin and inside the kidneys.  What are the causes?  This condition is caused by too much uric acid in your blood. This can happen because:  Your kidneys do not remove enough uric acid from your blood. This is the most common cause.  Your body makes too much uric acid. This can happen with some cancers and cancer treatments. It can also occur if your body is breaking down too many red blood cells (hemolytic anemia).  You eat too many foods that are high in purines. These foods include organ meats and some seafood. Alcohol, especially beer, is also high in purines.  A gout attack may be triggered by trauma or stress.  What increases the risk?  You are more likely to develop this condition if you:  Have a family history of gout.  Are male and middle-aged.  Are female and have gone through menopause.  Are obese.  Frequently drink alcohol, especially beer.  Are dehydrated.  Lose weight too quickly.  Have an organ transplant.  Have lead poisoning.  Take certain medicines, including aspirin, cyclosporine, diuretics, levodopa, and niacin.  Have kidney disease.  Have a skin condition called psoriasis.  What are the signs or symptoms?  An attack of acute gout happens quickly. It usually occurs in just one joint. The most common place is the big toe. Attacks often start  at night. Other joints that may be affected include joints of the feet, ankle, knee, fingers, wrist, or elbow. Symptoms of this condition may include:  Severe pain.  Warmth.  Swelling.  Stiffness.  Tenderness. The affected joint may be very painful to touch.  Shiny, red, or purple skin.  Chills and fever.  Chronic gout may cause symptoms more frequently. More joints may be involved. You may also have white or yellow lumps (tophi) on your hands or feet or in other areas near your joints.  How is this diagnosed?  This condition is diagnosed based on your symptoms, medical history, and physical exam. You may have tests, such as:  Blood tests to measure uric acid levels.  Removal of joint fluid with a thin needle (aspiration) to look for uric acid crystals.  X-rays to look for joint damage.  How is this treated?  Treatment for this condition has two phases: treating an acute attack and preventing future attacks. Acute gout treatment may include medicines to reduce pain and swelling, including:  NSAIDs.  Steroids. These are strong anti-inflammatory medicines that can be taken by mouth (orally) or injected into a joint.  Colchicine. This medicine relieves pain and swelling when it is taken soon after an attack. It can be given by mouth or through an IV.  Preventive treatment may include:  Daily use of smaller doses of NSAIDs or colchicine.  Use of a medicine that reduces uric acid levels in your blood.  Changes to your diet. You may need to see a dietitian about what to eat and drink to prevent gout.  Follow these instructions at home:  During a gout attack    If directed, put ice on the affected area:  Put ice in a plastic bag.  Place a towel between your skin and the bag.  Leave the ice on for 20 minutes, 2-3 times a day.  Raise (elevate) the affected joint above the level of your heart as often as possible.  Rest the joint as much as possible. If the affected joint is in your leg, you may be given crutches to  use.  Follow instructions from your health care provider about eating or drinking restrictions.  Avoiding future gout attacks  Follow a low-purine diet as told by your dietitian or health care provider. Avoid foods and drinks that are high in purines, including liver, kidney, anchovies, asparagus, herring, mushrooms, mussels, and beer.  Maintain a healthy weight or lose weight if you are overweight. If you want to lose weight, talk with your health care provider. It is important that you do not lose weight too quickly.  Start or maintain an exercise program as told by your health care provider.  Eating and drinking  Drink enough fluids to keep your urine pale yellow.  If you drink alcohol:  Limit how much you use to:  0-1 drink a day for women.  0-2 drinks a day for men.  Be aware of how much alcohol is in your drink. In the [place] one drink equals one 12 oz bottle of beer (355 mL) one 5 oz glass of wine (148 mL), or one 1½ oz glass of hard liquor (44 mL).  General instructions  Take over-the-counter and prescription medicines only as told by your health care provider.  Do not drive or use heavy machinery while taking prescription pain medicine.  Return to your normal activities as told by your health care provider. Ask your health care provider what activities are safe for you.  Keep all follow-up visits as told by your health care provider. This is important.  Contact a health care provider if you have:  Another gout attack.  Continuing symptoms of a gout attack after 10 days of treatment.  Side effects from your medicines.  Chills or a fever.  Burning pain when you urinate.  Pain in your lower back or belly.  Get help right away if you:  Have severe or uncontrolled pain.  Cannot urinate.  Summary  Gout is painful swelling of the joints caused by inflammation.  The most common site of pain is the big toe, but it can affect other joints in the body.  Medicines and dietary changes can help to prevent and treat gout  attacks.  This information is not intended to replace advice given to you by your health care provider. Make sure you discuss any questions you have with your health care provider.  Document Revised: 07/10/2019 Document Reviewed: 07/10/2019  [employer]vier Patient Education © 2022 [employer]

## 2022-10-03 NOTE — PROGRESS NOTES
"[facility] Internal Medicine    [facility]  1960   [id number]      Patient Care Team:  Wilfred Kim MD as PCP - General (Internal Medicine)    Chief Complaint::   Chief Complaint   Patient presents with   • swollen foot and big toe     Red hot to touch       HPI  The patient is a 62-year-old male who presents today for evaluation of edema in his left foot and hallux.    The patient states that he woke up yesterday morning on 10/02/2022 at 4:00 AM with a throbbing pain in his left hallux. He denies any throbbing pain in the past before and denies any history of gout. He denies any associated injury to the hallux. The patient has had a steroid injection in the past for his sciatica, but notes that it did not provide any relief. He denies any history of kidney stones.    The patient states that approximately 2 weeks ago, his potassium levels \"dumped out of his body\" and he was unable to stand up. He was told the water retention pill was discontinued. The patient also states he has a \"fatty liver.\" He notes that he has not had alcohol in 14 days. The patient admits to eating more seafood. He denies any routine monitoring of his blood glucose levels.         Patient Active Problem List   Diagnosis   • Essential hypertension   • Mixed hyperlipidemia   • Class 2 obesity due to excess calories without serious comorbidity in adult   • Reactive depression   • Gastroesophageal reflux disease without esophagitis   • Vitamin D deficiency   • Chronic low back pain   • Dyspnea on exertion   • Syncope, tussive   • Hypokalemia   • Cough syncope   • Allergic rhinitis   • Cough   • Hypercholesterolemia   • Iron deficiency anemia   • Vasovagal syncope   • Hernia of abdominal cavity   • Right sided sciatica   • Hepatic steatosis   • Acute gout involving toe of left foot        Past Medical History:   Diagnosis Date   • Anemia     iron deficiency   • Chronic low back pain 3/14/2018   • Colonic polyp    • ED (erectile " dysfunction)    • Esophagitis    • Essential hypertension 3/14/2018   • Gastroesophageal reflux disease without esophagitis 3/14/2018   • Hernia of abdominal cavity    • Hyperlipidemia    • Reactive depression 3/14/2018   • Tennis elbow    • Vitamin D deficiency 3/14/2018       Past Surgical History:   Procedure Laterality Date   • APPENDECTOMY     • CARDIAC CATHETERIZATION  2011   • ELBOW ARTHROPLASTY      Right    • ENDOSCOPY  2011   • ENDOSCOPY  2008    with biopsy   • ESOPHAGEAL MOTILITY STUDY N/A 6/27/2019    Procedure: MANOMETRY;  Surgeon: Mark Lowery MD;  Location: [facility] ENDOSCOPY;  Service: Gastroenterology   • FOOT SURGERY      left foot        Family History   Problem Relation Age of Onset   • Alzheimer's disease Mother    • Heart disease Father    • Heart attack Father    • Colon cancer Father    • Coronary artery disease Father    • No Known Problems Sister    • Cancer Paternal Grandmother    • Lung cancer Paternal Grandmother        Social History     Socioeconomic History   • Marital status:    Tobacco Use   • Smoking status: Never Smoker   • Smokeless tobacco: Former User     Types: Chew     Quit date: 2002   Substance and Sexual Activity   • Alcohol use: Yes     Alcohol/week: 2.0 standard drinks     Types: 2 Shots of liquor per week     Comment: per day, [place]    • Drug use: No   • Sexual activity: Yes     Partners: Female       Allergies   Allergen Reactions   • Sulfa Antibiotics Unknown (See Comments)     Pt unaware of this allergy   • Contrast Dye Rash       Review of Systems   Constitutional: Negative for chills, fatigue and fever.   Endocrine: Negative for cold intolerance and heat intolerance.   Musculoskeletal: Positive for arthralgias, gait problem and joint swelling.       Vital Signs  Vitals:    10/03/22 0952   BP: 120/80   BP Location: Left arm   Patient Position: Sitting   Cuff Size: Large Adult   Pulse: 88   Temp: 97.8 °F (36.6 °C)   TempSrc: Temporal   SpO2: 98%  "  Weight: 103 kg (228 lb)   Height: 177.8 cm (70\")   PainSc: 10-Worst pain ever   PainLoc: Toe     Body mass index is 32.71 kg/m².  BMI is >= 30 and <35. (Class 1 Obesity). The following options were offered after discussion;: weight loss educational material (shared in after visit summary), exercise counseling/recommendations and nutrition counseling/recommendations     Advance Care Planning   ACP discussion was held with the patient during this visit. Patient does not have an advance directive, information provided.       Current Outpatient Medications:   •  aspirin 81 MG EC tablet, Take 81 mg by mouth Daily., Disp: , Rfl:   •  esomeprazole (NexIUM) 40 MG packet, Take 1 capsule by mouth Every Night., Disp: , Rfl:   •  ferrous sulfate 325 (65 FE) MG EC tablet, ONE BY MOUTH TWICE A DAY (Patient taking differently: Take 325 mg by mouth 2 (Two) Times a Day.), Disp: 60 tablet, Rfl: 5  •  levocetirizine (XYZAL) 5 MG tablet, Take 5 mg by mouth Every Evening., Disp: , Rfl:   •  losartan (Cozaar) 25 MG tablet, Take 1 tablet by mouth Daily., Disp: 30 tablet, Rfl: 0  •  MAGNESIUM OXIDE PO, Take 250 mg by mouth Daily., Disp: , Rfl:   •  metoclopramide (REGLAN) 10 MG tablet, TAKE ONE TABLET BY MOUTH TWICE A DAY (Patient taking differently: Take 10 mg by mouth Daily.), Disp: 60 tablet, Rfl: 5  •  potassium chloride (KLOR-CON) 20 MEQ CR tablet, Take 1 tablet by mouth 2 (Two) Times a Day. (Patient taking differently: Take 60 mEq by mouth Daily.), Disp: 60 tablet, Rfl: 5  •  RA VITAMIN B-12 TR 1000 MCG tablet controlled-release, take 1 tablet by mouth once daily, Disp: 90 each, Rfl: 3  •  TURMERIC PO, Take 1 tablet by mouth Daily., Disp: , Rfl:   •  vitamin D (ERGOCALCIFEROL) 1.25 MG (98584 UT) capsule capsule, Take 1 capsule by mouth Every 30 (Thirty) Days., Disp: 4 capsule, Rfl: 5  •  colchicine 0.6 MG tablet, Take 2 tablets x 1, then 1 tablet 1 hour later, Disp: 3 tablet, Rfl: 0  •  predniSONE (DELTASONE) 20 MG tablet, Take 1 " tablet by mouth 2 (Two) Times a Day., Disp: 10 tablet, Rfl: 0    Physical Exam  Vitals and nursing note reviewed.   Constitutional:       Appearance: Normal appearance.   Cardiovascular:      Rate and Rhythm: Normal rate and regular rhythm.      Pulses: Normal pulses.      Heart sounds: Normal heart sounds.   Pulmonary:      Effort: Pulmonary effort is normal.      Breath sounds: Normal breath sounds.   Musculoskeletal:        Feet:    Skin:     Findings: Erythema present.             Comments: Swollen, erythematous left great toe.   Neurological:      Mental Status: He is alert.     HEENT: Pupils equal reactive ENT clear, no facial asymmetry, pharynx is clear  NECK: No masses bruit or thyromegaly  CHEST: Clear without rales wheezes or rhonchi  CARDIAC: Regular rhythm without gallop rub or click, blood pressure heart rate stable.  ABD: Liver spleen normal  : Deferred  NEURO: Intact  PSYCH: Normal  EXTREM: No significant arthritic changes, edema in his left foot and hallux.     ACE III MINI            Results Review:    Recent Results (from the past 672 hour(s))   Protime-INR    Collection Time: 09/09/22  8:58 AM    Specimen: Blood   Result Value Ref Range    Protime 13.2 11.4 - 14.4 Seconds    INR 1.01 0.84 - 1.13   DUPREE Fibrosure    Collection Time: 09/09/22  8:58 AM    Specimen: Blood   Result Value Ref Range    Fibrosis Score (References) 0.39 (H) 0.00 - 0.21    Fibrosis Stage (Reference) F1-F2     Steatosis Score (Reference) 0.62 (H) 0.00 - 0.30    Steatosis Grade (Reference) Comment     DUPREE Score (Reference) 0.50 (H) 0.25    Dupree Grade (Reference) Comment     Height: (Reference) 70 in    Weight: (Reference) 223 LBS    Alpha 2-Macroglobulins, Qn 165 110 - 276 mg/dL    Haptoglobin 62 32 - 363 mg/dL    Apolipoprotein A-1 217 (H) 101 - 178 mg/dL    Total Bilirubin 0.8 0.0 - 1.2 mg/dL     (H) 0 - 65 IU/L    ALT (SGPT) 72 (H) 0 - 55 IU/L    AST (SGOT) P5P (Reference) 76 (H) 0 - 40 IU/L    Cholesterol, Total  (Reference) 209 (H) 100 - 199 mg/dL    Glucose, Serum (Reference) 90 65 - 99 mg/dL    Triglycerides 100 0 - 149 mg/dL    Interpretations: (Reference) Comment     Fibrosis Scoring: Comment     Steatosis Grading (Reference) Comment     Strickland Scoring (Reference) Comment     Limitations: (Reference) Comment     Comment (Reference) Comment    PILAR    Collection Time: 09/09/22  8:58 AM    Specimen: Blood   Result Value Ref Range    PILAR Direct Negative Negative   Anti-Smooth Muscle Antibody Titer    Collection Time: 09/09/22  8:58 AM    Specimen: Blood   Result Value Ref Range    Smooth Muscle Ab 6 0 - 19 Units   Comprehensive Metabolic Panel    Collection Time: 09/09/22  8:58 AM    Specimen: Blood   Result Value Ref Range    Glucose 86 65 - 99 mg/dL    BUN 9 8 - 23 mg/dL    Creatinine 0.88 0.76 - 1.27 mg/dL    Sodium 139 136 - 145 mmol/L    Potassium 3.1 (L) 3.5 - 5.2 mmol/L    Chloride 99 98 - 107 mmol/L    CO2 23.4 22.0 - 29.0 mmol/L    Calcium 10.0 8.6 - 10.5 mg/dL    Total Protein 7.3 6.0 - 8.5 g/dL    Albumin 4.10 3.50 - 5.20 g/dL    ALT (SGPT) 62 (H) 1 - 41 U/L    AST (SGOT) 60 (H) 1 - 40 U/L    Alkaline Phosphatase 85 39 - 117 U/L    Total Bilirubin 0.9 0.0 - 1.2 mg/dL    Globulin 3.2 gm/dL    A/G Ratio 1.3 g/dL    BUN/Creatinine Ratio 10.2 7.0 - 25.0    Anion Gap 16.6 (H) 5.0 - 15.0 mmol/L    eGFR 97.2 >60.0 mL/min/1.73   Mitochondrial Antibodies, M2    Collection Time: 09/09/22  8:58 AM    Specimen: Blood   Result Value Ref Range    Mitochondrial Ab <20.0 0.0 - 20.0 Units   Hepatitis Panel, Acute    Collection Time: 09/09/22  8:58 AM    Specimen: Blood   Result Value Ref Range    Hepatitis B Surface Ag Non-Reactive Non-Reactive    Hep A IgM Non-Reactive Non-Reactive    Hep B C IgM Non-Reactive Non-Reactive    Hepatitis C Ab Non-Reactive Non-Reactive   Hepatitis B Surface Antibody    Collection Time: 09/09/22  8:58 AM    Specimen: Blood   Result Value Ref Range    Hep B S Ab Reactive (A) Non-Reactive   Hepatitis A  Antibody, Total    Collection Time: 09/09/22  8:58 AM    Specimen: Blood   Result Value Ref Range    Hep A Total Ab Positive (A) Negative   Iron Profile    Collection Time: 09/09/22  8:58 AM    Specimen: Blood   Result Value Ref Range    Iron 106 59 - 158 mcg/dL    Iron Saturation 35 20 - 50 %    Transferrin 202 200 - 360 mg/dL    TIBC 301 298 - 536 mcg/dL   Celiac Disease Panel    Collection Time: 09/09/22  8:58 AM    Specimen: Blood   Result Value Ref Range    Endomysial IgA Negative Negative    Tissue Transglutaminase IgA <2 0 - 3 U/mL    IgA 192 61 - 437 mg/dL   CBC Auto Differential    Collection Time: 09/09/22  8:58 AM    Specimen: Blood   Result Value Ref Range    WBC 4.23 3.40 - 10.80 10*3/mm3    RBC 4.64 4.14 - 5.80 10*6/mm3    Hemoglobin 17.6 13.0 - 17.7 g/dL    Hematocrit 48.1 37.5 - 51.0 %    .7 (H) 79.0 - 97.0 fL    MCH 37.9 (H) 26.6 - 33.0 pg    MCHC 36.6 (H) 31.5 - 35.7 g/dL    RDW 15.2 12.3 - 15.4 %    RDW-SD 56.2 (H) 37.0 - 54.0 fl    MPV 10.4 6.0 - 12.0 fL    Platelets 187 140 - 450 10*3/mm3    Neutrophil % 62.1 42.7 - 76.0 %    Lymphocyte % 22.2 19.6 - 45.3 %    Monocyte % 13.2 (H) 5.0 - 12.0 %    Eosinophil % 1.4 0.3 - 6.2 %    Basophil % 0.9 0.0 - 1.5 %    Immature Grans % 0.2 0.0 - 0.5 %    Neutrophils, Absolute 2.62 1.70 - 7.00 10*3/mm3    Lymphocytes, Absolute 0.94 0.70 - 3.10 10*3/mm3    Monocytes, Absolute 0.56 0.10 - 0.90 10*3/mm3    Eosinophils, Absolute 0.06 0.00 - 0.40 10*3/mm3    Basophils, Absolute 0.04 0.00 - 0.20 10*3/mm3    Immature Grans, Absolute 0.01 0.00 - 0.05 10*3/mm3    nRBC 0.0 0.0 - 0.2 /100 WBC   Comprehensive Metabolic Panel    Collection Time: 09/19/22  3:41 PM    Specimen: Blood   Result Value Ref Range    Glucose 93 65 - 99 mg/dL    BUN 8 8 - 23 mg/dL    Creatinine 0.92 0.76 - 1.27 mg/dL    Sodium 138 136 - 145 mmol/L    Potassium 3.3 (L) 3.5 - 5.2 mmol/L    Chloride 96 (L) 98 - 107 mmol/L    CO2 28.9 22.0 - 29.0 mmol/L    Calcium 10.1 8.6 - 10.5 mg/dL    Total  Protein 7.2 6.0 - 8.5 g/dL    Albumin 4.60 3.50 - 5.20 g/dL    ALT (SGPT) 56 (H) 1 - 41 U/L    AST (SGOT) 44 (H) 1 - 40 U/L    Alkaline Phosphatase 97 39 - 117 U/L    Total Bilirubin 1.2 0.0 - 1.2 mg/dL    Globulin 2.6 gm/dL    A/G Ratio 1.8 g/dL    BUN/Creatinine Ratio 8.7 7.0 - 25.0    Anion Gap 13.1 5.0 - 15.0 mmol/L    eGFR 94.1 >60.0 mL/min/1.73   Comprehensive Metabolic Panel    Collection Time: 10/03/22  9:07 AM    Specimen: Blood   Result Value Ref Range    Glucose 95 65 - 99 mg/dL    BUN 10 8 - 23 mg/dL    Creatinine 0.88 0.76 - 1.27 mg/dL    Sodium 138 136 - 145 mmol/L    Potassium 3.9 3.5 - 5.2 mmol/L    Chloride 100 98 - 107 mmol/L    CO2 28.6 22.0 - 29.0 mmol/L    Calcium 9.5 8.6 - 10.5 mg/dL    Total Protein 7.0 6.0 - 8.5 g/dL    Albumin 4.50 3.50 - 5.20 g/dL    ALT (SGPT) 92 (H) 1 - 41 U/L    AST (SGOT) 49 (H) 1 - 40 U/L    Alkaline Phosphatase 101 39 - 117 U/L    Total Bilirubin 1.3 (H) 0.0 - 1.2 mg/dL    Globulin 2.5 gm/dL    A/G Ratio 1.8 g/dL    BUN/Creatinine Ratio 11.4 7.0 - 25.0    Anion Gap 9.4 5.0 - 15.0 mmol/L    eGFR 97.2 >60.0 mL/min/1.73   Uric Acid    Collection Time: 10/03/22  9:07 AM    Specimen: Blood   Result Value Ref Range    Uric Acid 5.2 3.4 - 7.0 mg/dL     Procedures    Medication Review: Medications reviewed and noted    Social History     Socioeconomic History   • Marital status:    Tobacco Use   • Smoking status: Never Smoker   • Smokeless tobacco: Former User     Types: Chew     Quit date: 2002   Substance and Sexual Activity   • Alcohol use: Yes     Alcohol/week: 2.0 standard drinks     Types: 2 Shots of liquor per week     Comment: per day, [place]    • Drug use: No   • Sexual activity: Yes     Partners: Female        Assessment/Plan:    Problem List Items Addressed This Visit        Gastrointestinal Abdominal     Hepatic steatosis    Overview     CMP ordered for today.            Genitourinary and Reproductive     Hypokalemia    Overview     Repeats labs today             Musculoskeletal and Injuries    Acute gout involving toe of left foot - Primary    Current Assessment & Plan     - The patient will be prescribed prednisone 20 mg to take twice daily for 5 days. He will also be prescribed colchicine. If his symptoms do not improve, he will let us know.  - The patient was recommended to take allopurinol in the near future to prevent reoccurrence of gout.            Relevant Medications    predniSONE (DELTASONE) 20 MG tablet    colchicine 0.6 MG tablet    Other Relevant Orders    Uric Acid (Completed)    CBC & Differential           Patient Instructions   Gout  Gout is a condition that causes painful swelling of the joints. Gout is a type of inflammation of the joints (arthritis). This condition is caused by having too much uric acid in the body. Uric acid is a chemical that forms when the body breaks down substances called purines. Purines are important for building body proteins.  When the body has too much uric acid, sharp crystals can form and build up inside the joints. This causes pain and swelling. Gout attacks can happen quickly and may be very painful (acute gout). Over time, the attacks can affect more joints and become more frequent (chronic gout). Gout can also cause uric acid to build up under the skin and inside the kidneys.  What are the causes?  This condition is caused by too much uric acid in your blood. This can happen because:  • Your kidneys do not remove enough uric acid from your blood. This is the most common cause.  • Your body makes too much uric acid. This can happen with some cancers and cancer treatments. It can also occur if your body is breaking down too many red blood cells (hemolytic anemia).  • You eat too many foods that are high in purines. These foods include organ meats and some seafood. Alcohol, especially beer, is also high in purines.  A gout attack may be triggered by trauma or stress.  What increases the risk?  You are more likely to  develop this condition if you:  • Have a family history of gout.  • Are male and middle-aged.  • Are female and have gone through menopause.  • Are obese.  • Frequently drink alcohol, especially beer.  • Are dehydrated.  • Lose weight too quickly.  • Have an organ transplant.  • Have lead poisoning.  • Take certain medicines, including aspirin, cyclosporine, diuretics, levodopa, and niacin.  • Have kidney disease.  • Have a skin condition called psoriasis.  What are the signs or symptoms?  An attack of acute gout happens quickly. It usually occurs in just one joint. The most common place is the big toe. Attacks often start at night. Other joints that may be affected include joints of the feet, ankle, knee, fingers, wrist, or elbow. Symptoms of this condition may include:  • Severe pain.  • Warmth.  • Swelling.  • Stiffness.  • Tenderness. The affected joint may be very painful to touch.  • Shiny, red, or purple skin.  • Chills and fever.  Chronic gout may cause symptoms more frequently. More joints may be involved. You may also have white or yellow lumps (tophi) on your hands or feet or in other areas near your joints.  How is this diagnosed?  This condition is diagnosed based on your symptoms, medical history, and physical exam. You may have tests, such as:  • Blood tests to measure uric acid levels.  • Removal of joint fluid with a thin needle (aspiration) to look for uric acid crystals.  • X-rays to look for joint damage.  How is this treated?  Treatment for this condition has two phases: treating an acute attack and preventing future attacks. Acute gout treatment may include medicines to reduce pain and swelling, including:  • NSAIDs.  • Steroids. These are strong anti-inflammatory medicines that can be taken by mouth (orally) or injected into a joint.  • Colchicine. This medicine relieves pain and swelling when it is taken soon after an attack. It can be given by mouth or through an IV.  Preventive treatment  may include:  • Daily use of smaller doses of NSAIDs or colchicine.  • Use of a medicine that reduces uric acid levels in your blood.  • Changes to your diet. You may need to see a dietitian about what to eat and drink to prevent gout.  Follow these instructions at home:  During a gout attack    1. If directed, put ice on the affected area:  ? Put ice in a plastic bag.  ? Place a towel between your skin and the bag.  ? Leave the ice on for 20 minutes, 2-3 times a day.  2. Raise (elevate) the affected joint above the level of your heart as often as possible.  3. Rest the joint as much as possible. If the affected joint is in your leg, you may be given crutches to use.  4. Follow instructions from your health care provider about eating or drinking restrictions.  Avoiding future gout attacks  • Follow a low-purine diet as told by your dietitian or health care provider. Avoid foods and drinks that are high in purines, including liver, kidney, anchovies, asparagus, herring, mushrooms, mussels, and beer.  • Maintain a healthy weight or lose weight if you are overweight. If you want to lose weight, talk with your health care provider. It is important that you do not lose weight too quickly.  • Start or maintain an exercise program as told by your health care provider.  Eating and drinking  1. Drink enough fluids to keep your urine pale yellow.  2. If you drink alcohol:  1. Limit how much you use to:  - 0-1 drink a day for women.  - 0-2 drinks a day for men.  2. Be aware of how much alcohol is in your drink. In the [place] one drink equals one 12 oz bottle of beer (355 mL) one 5 oz glass of wine (148 mL), or one 1½ oz glass of hard liquor (44 mL).  General instructions  • Take over-the-counter and prescription medicines only as told by your health care provider.  • Do not drive or use heavy machinery while taking prescription pain medicine.  • Return to your normal activities as told by your health care provider. Ask your  health care provider what activities are safe for you.  • Keep all follow-up visits as told by your health care provider. This is important.  Contact a health care provider if you have:  • Another gout attack.  • Continuing symptoms of a gout attack after 10 days of treatment.  • Side effects from your medicines.  • Chills or a fever.  • Burning pain when you urinate.  • Pain in your lower back or belly.  Get help right away if you:  • Have severe or uncontrolled pain.  • Cannot urinate.  Summary  • Gout is painful swelling of the joints caused by inflammation.  • The most common site of pain is the big toe, but it can affect other joints in the body.  • Medicines and dietary changes can help to prevent and treat gout attacks.  This information is not intended to replace advice given to you by your health care provider. Make sure you discuss any questions you have with your health care provider.  Document Revised: 07/10/2019 Document Reviewed: 07/10/2019  [employer] Patient Education © 2022 [employer]         Plan of care reviewed with patient at the conclusion of today's visit. Education was provided regarding diagnosis, management, and any prescribed or recommended OTC medications.Patient verbalizes understanding of and agreement with management plan.       I spent 17 minutes caring for Bala on this date of service. This time includes time spent by me in the following activities:preparing for the visit, reviewing tests, obtaining and/or reviewing a separately obtained history, performing a medically appropriate examination and/or evaluation , counseling and educating the patient/family/caregiver, ordering medications, tests, or procedures, referring and communicating with other health care professionals  and documenting information in the medical record    Jenny Ruano PA-C      Note: Part of this note may be an electronic transcription/translation of spoken language to printed text using the Dragon  Dictation system.    Transcribed from ambient dictation for Jenny Ruano PA-C by Joy Duffy.  10/03/22   11:50 EDT    Patient verbalized consent to the visit recording.  I have personally performed the services described in this document as transcribed by the above individual, and it is both accurate and complete.  Jenny Ruano PA-C  10/4/2022  07:08 EDT

## 2022-10-03 NOTE — ASSESSMENT & PLAN NOTE
- The patient will be prescribed prednisone 20 mg to take twice daily for 5 days. He will also be prescribed colchicine. If his symptoms do not improve, he will let us know.  - The patient was recommended to take allopurinol in the near future to prevent reoccurrence of gout.

## 2022-10-12 ENCOUNTER — TELEPHONE (OUTPATIENT)
Dept: INTERNAL MEDICINE | Facility: CLINIC | Age: 62
End: 2022-10-12

## 2022-10-12 DIAGNOSIS — M10.9 ACUTE GOUT INVOLVING TOE OF LEFT FOOT, UNSPECIFIED CAUSE: ICD-10-CM

## 2022-10-12 RX ORDER — COLCHICINE 0.6 MG/1
TABLET ORAL
Qty: 30 TABLET | Refills: 0 | Status: SHIPPED | OUTPATIENT
Start: 2022-10-12 | End: 2022-11-11

## 2022-10-12 NOTE — TELEPHONE ENCOUNTER
Caller: Bala Staley III    Relationship to patient: Self    Best call back number: [phone]    Patient is needing:PATIENT WOULD LIKE TO SPEAK WITH NURSE ABOUT RESULTS AND ALSO TO SEE WHAT HE WOULD NEED TO DO ABOUT THE GOUT THAT IS IN HIS BIG TOE    PLEASE ADVISE

## 2022-10-12 NOTE — TELEPHONE ENCOUNTER
His uric acid level was normal, but that could be because all the uric acid was in his toe.  Has the pain and inflammation of the toe resolved?  If yes, the treatment is to take the colchicine Jenny gave him at the first sign of recurrence.  At the next visit, we will repeat uric acid, if it's high, will discuss further treatment.

## 2022-10-12 NOTE — TELEPHONE ENCOUNTER
Patient states that today is the first day he is able to get on a shoe and it is not comfortable at all. Also states its still red and swollen also painful to touch with shooting pain.

## 2022-10-12 NOTE — TELEPHONE ENCOUNTER
Ok. I'm surprised the prednisone didn't help, usually very effective for gout.   Go ahead and take the colchicine every day.  Call next week if not better because at that point, have to consider things besides gout.

## 2022-10-12 NOTE — TELEPHONE ENCOUNTER
Patient verbalized understanding but states he would need more colchicine states he has already finished because they only gave him 6

## 2022-10-19 RX ORDER — LOSARTAN POTASSIUM 25 MG/1
TABLET ORAL
Qty: 30 TABLET | Refills: 5 | Status: SHIPPED | OUTPATIENT
Start: 2022-10-19 | End: 2023-01-18 | Stop reason: SDUPTHER

## 2022-10-25 ENCOUNTER — OUTSIDE FACILITY SERVICE (OUTPATIENT)
Dept: GASTROENTEROLOGY | Facility: CLINIC | Age: 62
End: 2022-10-25

## 2022-10-25 PROCEDURE — 43239 EGD BIOPSY SINGLE/MULTIPLE: CPT | Performed by: INTERNAL MEDICINE

## 2022-10-25 PROCEDURE — 43249 ESOPH EGD DILATION <30 MM: CPT | Performed by: INTERNAL MEDICINE

## 2022-10-25 PROCEDURE — 88305 TISSUE EXAM BY PATHOLOGIST: CPT | Performed by: INTERNAL MEDICINE

## 2022-10-25 PROCEDURE — G0500 MOD SEDAT ENDO SERVICE >5YRS: HCPCS | Performed by: INTERNAL MEDICINE

## 2022-10-26 ENCOUNTER — LAB REQUISITION (OUTPATIENT)
Dept: LAB | Facility: HOSPITAL | Age: 62
End: 2022-10-26

## 2022-10-26 DIAGNOSIS — R76.0 RAISED ANTIBODY TITER: ICD-10-CM

## 2022-10-27 ENCOUNTER — TELEPHONE (OUTPATIENT)
Dept: GASTROENTEROLOGY | Facility: CLINIC | Age: 62
End: 2022-10-27

## 2022-10-27 NOTE — TELEPHONE ENCOUNTER
----- Message from Case Harry MD sent at 10/27/2022 10:47 AM EDT -----  Please let Mueller know that he does not have H. pylori

## 2022-10-27 NOTE — TELEPHONE ENCOUNTER
I TRIED TO CALL [facility] TO GIVE BIOPSY RESULTS. NO ANSWER; LEFT VOICE MESSAGE. I WILL MAIL RESULTS AND SEND TO MY CHART.

## 2022-11-08 ENCOUNTER — OFFICE VISIT (OUTPATIENT)
Dept: GASTROENTEROLOGY | Facility: CLINIC | Age: 62
End: 2022-11-08

## 2022-11-08 ENCOUNTER — LAB (OUTPATIENT)
Dept: LAB | Facility: HOSPITAL | Age: 62
End: 2022-11-08

## 2022-11-08 VITALS
SYSTOLIC BLOOD PRESSURE: 128 MMHG | TEMPERATURE: 97.3 F | HEIGHT: 70 IN | WEIGHT: 228 LBS | OXYGEN SATURATION: 98 % | BODY MASS INDEX: 32.64 KG/M2 | HEART RATE: 77 BPM | DIASTOLIC BLOOD PRESSURE: 76 MMHG

## 2022-11-08 DIAGNOSIS — K21.9 GASTROESOPHAGEAL REFLUX DISEASE, UNSPECIFIED WHETHER ESOPHAGITIS PRESENT: ICD-10-CM

## 2022-11-08 DIAGNOSIS — K76.0 FATTY LIVER: ICD-10-CM

## 2022-11-08 DIAGNOSIS — R74.8 ELEVATED LIVER ENZYMES: ICD-10-CM

## 2022-11-08 DIAGNOSIS — K76.0 FATTY LIVER: Primary | ICD-10-CM

## 2022-11-08 LAB
ALBUMIN SERPL-MCNC: 4.2 G/DL (ref 3.5–5.2)
ALBUMIN/GLOB SERPL: 1.6 G/DL
ALP SERPL-CCNC: 89 U/L (ref 39–117)
ALT SERPL W P-5'-P-CCNC: 43 U/L (ref 1–41)
ANION GAP SERPL CALCULATED.3IONS-SCNC: 8.8 MMOL/L (ref 5–15)
AST SERPL-CCNC: 29 U/L (ref 1–40)
BASOPHILS # BLD AUTO: 0.03 10*3/MM3 (ref 0–0.2)
BASOPHILS NFR BLD AUTO: 0.6 % (ref 0–1.5)
BILIRUB SERPL-MCNC: 0.6 MG/DL (ref 0–1.2)
BUN SERPL-MCNC: 11 MG/DL (ref 8–23)
BUN/CREAT SERPL: 10.7 (ref 7–25)
CALCIUM SPEC-SCNC: 9.8 MG/DL (ref 8.6–10.5)
CHLORIDE SERPL-SCNC: 102 MMOL/L (ref 98–107)
CO2 SERPL-SCNC: 29.2 MMOL/L (ref 22–29)
CREAT SERPL-MCNC: 1.03 MG/DL (ref 0.76–1.27)
DEPRECATED RDW RBC AUTO: 46.2 FL (ref 37–54)
EGFRCR SERPLBLD CKD-EPI 2021: 82.1 ML/MIN/1.73
EOSINOPHIL # BLD AUTO: 0.1 10*3/MM3 (ref 0–0.4)
EOSINOPHIL NFR BLD AUTO: 1.9 % (ref 0.3–6.2)
ERYTHROCYTE [DISTWIDTH] IN BLOOD BY AUTOMATED COUNT: 12.5 % (ref 12.3–15.4)
GLOBULIN UR ELPH-MCNC: 2.6 GM/DL
GLUCOSE SERPL-MCNC: 84 MG/DL (ref 65–99)
HCT VFR BLD AUTO: 48.9 % (ref 37.5–51)
HGB BLD-MCNC: 17 G/DL (ref 13–17.7)
IMM GRANULOCYTES # BLD AUTO: 0.01 10*3/MM3 (ref 0–0.05)
IMM GRANULOCYTES NFR BLD AUTO: 0.2 % (ref 0–0.5)
INR PPP: 1.06 (ref 0.84–1.13)
LYMPHOCYTES # BLD AUTO: 1.01 10*3/MM3 (ref 0.7–3.1)
LYMPHOCYTES NFR BLD AUTO: 19.2 % (ref 19.6–45.3)
MCH RBC QN AUTO: 34.8 PG (ref 26.6–33)
MCHC RBC AUTO-ENTMCNC: 34.8 G/DL (ref 31.5–35.7)
MCV RBC AUTO: 100 FL (ref 79–97)
MONOCYTES # BLD AUTO: 0.64 10*3/MM3 (ref 0.1–0.9)
MONOCYTES NFR BLD AUTO: 12.2 % (ref 5–12)
NEUTROPHILS NFR BLD AUTO: 3.46 10*3/MM3 (ref 1.7–7)
NEUTROPHILS NFR BLD AUTO: 65.9 % (ref 42.7–76)
NRBC BLD AUTO-RTO: 0 /100 WBC (ref 0–0.2)
PLATELET # BLD AUTO: 168 10*3/MM3 (ref 140–450)
PMV BLD AUTO: 11.8 FL (ref 6–12)
POTASSIUM SERPL-SCNC: 4.2 MMOL/L (ref 3.5–5.2)
PROT SERPL-MCNC: 6.8 G/DL (ref 6–8.5)
PROTHROMBIN TIME: 13.7 SECONDS (ref 11.4–14.4)
RBC # BLD AUTO: 4.89 10*6/MM3 (ref 4.14–5.8)
SODIUM SERPL-SCNC: 140 MMOL/L (ref 136–145)
WBC NRBC COR # BLD: 5.25 10*3/MM3 (ref 3.4–10.8)

## 2022-11-08 PROCEDURE — 80053 COMPREHEN METABOLIC PANEL: CPT | Performed by: PHYSICIAN ASSISTANT

## 2022-11-08 PROCEDURE — 99214 OFFICE O/P EST MOD 30 MIN: CPT | Performed by: PHYSICIAN ASSISTANT

## 2022-11-08 PROCEDURE — 36415 COLL VENOUS BLD VENIPUNCTURE: CPT | Performed by: PHYSICIAN ASSISTANT

## 2022-11-08 PROCEDURE — 85610 PROTHROMBIN TIME: CPT | Performed by: PHYSICIAN ASSISTANT

## 2022-11-08 PROCEDURE — 85025 COMPLETE CBC W/AUTO DIFF WBC: CPT

## 2022-11-08 NOTE — PROGRESS NOTES
Follow Up      Patient Name: Bala Staley III  : 1960   MRN: [id number]     Chief Complaint:    Chief Complaint   Patient presents with   • Follow-up     egd       History of Present Illness: Bala Staley III is a 62 y.o. male who is here today for post procedure follow up.    EGD 10/25 with Dr. Harry. 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 TTS dilator used to dilate to 19mm. Mild antral gastritis. Bx reveals mild reactive gastropathy, no H Pylori, intestinal metaplasia or dysplasia.     Labs since last visit reveal fibrosure F1/F2 fibrosis, S2 steatosis. Immunity to Hep A/B. PILAR negative, autoimmune liver disease workup negative. Celiac panel negative. CBC reveals Hb 17.6, .7, MCH 37.9. CMP reveals total bili 0.9, AST 60, ALT 62, alk phos 85. Iron profile normal.    Pt does note few episodes of esophageal dysphagia, which he describes as distal bolu sensation s/p dilation. Sensation is alleviated with repeated swallows. He continues to take nexium 40mg daily. He has avoided EtOH x 56 days. Patient denies associated fever, chills, abdominal pain, indigestion, nausea, vomiting, diarrhea, constipation, hematemesis, dysphagia, hematochezia, melena, bloating, weight loss or gain, dysuria, jaundice or bruising.    Subjective      Review of Systems:   Review of Systems   Constitutional: Negative for appetite change, chills, diaphoresis, fatigue, fever, unexpected weight gain and unexpected weight loss.   HENT: Positive for trouble swallowing. Negative for drooling, facial swelling, mouth sores, nosebleeds, rhinorrhea, sore throat, swollen glands and tinnitus.    Eyes: Negative.    Respiratory: Negative for choking, chest tightness and shortness of breath.    Gastrointestinal: Negative for abdominal pain, anal bleeding, blood in stool, constipation, diarrhea, nausea, vomiting, GERD and indigestion.   Endocrine: Negative.    Genitourinary: Negative for  dysuria, flank pain and hematuria.   Musculoskeletal: Negative for arthralgias, back pain, myalgias and neck pain.   Skin: Negative for color change, dry skin, pallor and bruise.   Neurological: Negative for dizziness, tremors, syncope, weakness and numbness.   Psychiatric/Behavioral: Negative for decreased concentration, hallucinations and self-injury. The patient is not nervous/anxious.    All other systems reviewed and are negative.      Medications:     Current Outpatient Medications:   •  aspirin 81 MG EC tablet, Take 81 mg by mouth Daily., Disp: , Rfl:   •  colchicine 0.6 MG tablet, Take 1 tablet a day., Disp: 30 tablet, Rfl: 0  •  esomeprazole (NexIUM) 40 MG packet, Take 1 capsule by mouth Every Night., Disp: , Rfl:   •  ferrous sulfate 325 (65 FE) MG EC tablet, ONE BY MOUTH TWICE A DAY (Patient taking differently: Take 325 mg by mouth 2 (Two) Times a Day.), Disp: 60 tablet, Rfl: 5  •  levocetirizine (XYZAL) 5 MG tablet, Take 5 mg by mouth Every Evening., Disp: , Rfl:   •  losartan (COZAAR) 25 MG tablet, TAKE ONE TABLET BY MOUTH DAILY, Disp: 30 tablet, Rfl: 5  •  MAGNESIUM OXIDE PO, Take 250 mg by mouth Daily., Disp: , Rfl:   •  metoclopramide (REGLAN) 10 MG tablet, TAKE ONE TABLET BY MOUTH TWICE A DAY (Patient taking differently: Take 10 mg by mouth Daily.), Disp: 60 tablet, Rfl: 5  •  potassium chloride (KLOR-CON) 20 MEQ CR tablet, Take 1 tablet by mouth 2 (Two) Times a Day. (Patient taking differently: Take 60 mEq by mouth Daily.), Disp: 60 tablet, Rfl: 5  •  predniSONE (DELTASONE) 20 MG tablet, Take 1 tablet by mouth 2 (Two) Times a Day., Disp: 10 tablet, Rfl: 0  •  RA VITAMIN B-12 TR 1000 MCG tablet controlled-release, take 1 tablet by mouth once daily, Disp: 90 each, Rfl: 3  •  TURMERIC PO, Take 1 tablet by mouth Daily., Disp: , Rfl:   •  vitamin D (ERGOCALCIFEROL) 1.25 MG (90740 UT) capsule capsule, Take 1 capsule by mouth Every 30 (Thirty) Days., Disp: 4 capsule, Rfl: 5    Allergies:   Allergies  "  Allergen Reactions   • Sulfa Antibiotics Unknown (See Comments)     Pt unaware of this allergy   • Contrast Dye Rash       Social History:   Social History     Socioeconomic History   • Marital status:    Tobacco Use   • Smoking status: Never   • Smokeless tobacco: Former     Types: Chew     Quit date: 2002   Substance and Sexual Activity   • Alcohol use: Yes     Alcohol/week: 2.0 standard drinks     Types: 2 Shots of liquor per week     Comment: per day, [place]    • Drug use: No   • Sexual activity: Yes     Partners: Female        Surgical History:   Past Surgical History:   Procedure Laterality Date   • APPENDECTOMY     • CARDIAC CATHETERIZATION  2011   • ELBOW ARTHROPLASTY      Right    • ENDOSCOPY  2011   • ENDOSCOPY  2008    with biopsy   • ESOPHAGEAL MOTILITY STUDY N/A 6/27/2019    Procedure: MANOMETRY;  Surgeon: Mark Lowery MD;  Location: [facility];  Service: Gastroenterology   • FOOT SURGERY      left foot         Medical History:   Past Medical History:   Diagnosis Date   • Anemia     iron deficiency   • Chronic low back pain 3/14/2018   • Colonic polyp    • ED (erectile dysfunction)    • Esophagitis    • Essential hypertension 3/14/2018   • Gastroesophageal reflux disease without esophagitis 3/14/2018   • Hernia of abdominal cavity    • Hyperlipidemia    • Reactive depression 3/14/2018   • Tennis elbow    • Vitamin D deficiency 3/14/2018        Objective     Physical Exam:  Vital Signs:   Vitals:    11/08/22 0950   Height: 177.8 cm (70\")     Body mass index is 32.71 kg/m².     Physical Exam  Vitals and nursing note reviewed.   Constitutional:       General: He is not in acute distress.     Appearance: Normal appearance. He is not ill-appearing or diaphoretic.      Comments: Pleasantly conversant   HENT:      Head: Normocephalic and atraumatic.      Right Ear: External ear normal.      Left Ear: External ear normal.      Nose: Nose normal. No rhinorrhea.      Mouth/Throat:      " Mouth: Mucous membranes are moist.      Pharynx: Oropharynx is clear. No posterior oropharyngeal erythema.   Eyes:      General:         Right eye: No discharge.         Left eye: No discharge.      Conjunctiva/sclera: Conjunctivae normal.      Pupils: Pupils are equal, round, and reactive to light.   Cardiovascular:      Rate and Rhythm: Normal rate and regular rhythm.      Pulses: Normal pulses.      Heart sounds: No murmur heard.    No friction rub.   Pulmonary:      Effort: Pulmonary effort is normal. No respiratory distress.      Breath sounds: Normal breath sounds. No wheezing.   Abdominal:      General: Abdomen is flat. There is no distension.      Tenderness: There is no abdominal tenderness. There is no guarding or rebound.   Musculoskeletal:         General: Normal range of motion.      Cervical back: Normal range of motion and neck supple. No rigidity.   Skin:     General: Skin is warm and dry.      Capillary Refill: Capillary refill takes less than 2 seconds.      Coloration: Skin is not jaundiced or pale.      Comments: Telangectasias noted to bilateral cheeks and nose   Neurological:      General: No focal deficit present.      Mental Status: He is alert and oriented to person, place, and time. Mental status is at baseline.   Psychiatric:         Mood and Affect: Mood normal.         Thought Content: Thought content normal.         Judgment: Judgment normal.         Assessment / Plan      Assessment/Plan:   There are no diagnoses linked to this encounter.     Fatty liver with elevated liver enzymes  GERD  Esophageal dysphagia   - continue esomeprazole 40mg daily   - pt given Mediterranean diet instructions   - previous labs, imaging, endoscopy and pathology reports reviewed   - obtain CBC, CMP, PT/INR   - follow up in clinic in 6mo, or after completion of above studies   - call clinic at any time for questions or new / worsened sx    Follow Up:   Return in about 6 months (around 5/8/2023).    Plan of  care reviewed with the patient at the conclusion of today's visit.  Education was provided regarding diagnosis, management, and any prescribed or recommended OTC medications.  Patient verbalized understanding of and agreement with management plan.     NOTE TO PATIENT: The 21st Century Cures Act makes medical notes like these available to patients in the interest of transparency. However, be advised this is a medical document. It is intended as peer to peer communication. It is written in medical language and may contain abbreviations or verbiage that are unfamiliar. It may appear blunt or direct. Medical documents are intended to carry relevant information, facts as evident, and the clinical opinion of the practitioner.     Time Statement:   Discussed plan of care in detail with patient today. Patient verbally understands and agrees. I have spent 30 minutes reviewing available diagnostics, obtaining history, examining the patient, developing a treatment plan, and educating the patient on disease process and plan of care.     Alba Gomes PA-C   [facility]

## 2022-11-09 ENCOUNTER — TELEPHONE (OUTPATIENT)
Dept: INTERNAL MEDICINE | Facility: CLINIC | Age: 62
End: 2022-11-09

## 2022-11-09 RX ORDER — OSELTAMIVIR PHOSPHATE 75 MG/1
75 CAPSULE ORAL DAILY
Qty: 7 CAPSULE | Refills: 0 | Status: SHIPPED | OUTPATIENT
Start: 2022-11-09 | End: 2023-03-09

## 2022-11-09 NOTE — TELEPHONE ENCOUNTER
Caller: Nery Staley    Relationship: Self    Best call back number: [phone]    What medication are you requesting: TAMIFLU    What are your current symptoms: NONE    If a prescription is needed, what is your preferred pharmacy and phone number: [facility] [id number] - [place], [place] - [address] - [phone]  - [phone] FX     Additional notes: PATIENT HAS BEEN KEEPING HIS GRANDSON WHO TESTED POSITIVE FOR THE FLU. THE PEDIATRICIAN RECOMMENED THE PATIENT GETS PRESCRIBED TAMIFLU AND START TAKING IT.

## 2022-11-09 NOTE — PROGRESS NOTES
Please let Mueller know that his CBC reveals improved MCV and MCH, consistent with his EtOH avoidance. His CMP reveals essentially normal LFTs, which is excellent! PT/INR normal.    Continue lifestyle modifications as discussed, and follow up in 6 months. Thanks!

## 2022-11-11 DIAGNOSIS — M10.9 ACUTE GOUT INVOLVING TOE OF LEFT FOOT, UNSPECIFIED CAUSE: ICD-10-CM

## 2022-11-11 RX ORDER — COLCHICINE 0.6 MG/1
TABLET ORAL
Qty: 30 TABLET | Refills: 0 | Status: SHIPPED | OUTPATIENT
Start: 2022-11-11 | End: 2023-02-08 | Stop reason: SDUPTHER

## 2022-11-11 RX ORDER — METOCLOPRAMIDE 10 MG/1
TABLET ORAL
Qty: 60 TABLET | Refills: 5 | Status: SHIPPED | OUTPATIENT
Start: 2022-11-11 | End: 2023-01-19 | Stop reason: SDUPTHER

## 2022-11-11 NOTE — TELEPHONE ENCOUNTER
Rx Refill Note  Requested Prescriptions     Pending Prescriptions Disp Refills   • metoclopramide (REGLAN) 10 MG tablet [Pharmacy Med Name: METOCLOPRAMIDE 10 MG TABLET] 60 tablet 5     Sig: TAKE ONE TABLET BY MOUTH TWICE A DAY   • colchicine 0.6 MG tablet [Pharmacy Med Name: COLCHICINE 0.6 MG TABLET] 30 tablet 0     Sig: TAKE ONE TABLET BY MOUTH DAILY      Last office visit with prescribing clinician: 10/03/22  Next office visit with prescribing clinician: 3/27/2023            Nohemy Swenson LPN  11/11/22, 15:56 EST

## 2023-01-09 RX ORDER — POTASSIUM CHLORIDE 20 MEQ/1
20 TABLET, EXTENDED RELEASE ORAL 3 TIMES DAILY
Qty: 90 TABLET | Refills: 1 | Status: SHIPPED | OUTPATIENT
Start: 2023-01-09 | End: 2023-02-06 | Stop reason: SDUPTHER

## 2023-01-09 NOTE — TELEPHONE ENCOUNTER
Caller: Bala Staley III    Relationship: Self    Best call back number: [phone]    Requested Prescriptions:   Requested Prescriptions     Pending Prescriptions Disp Refills   • potassium chloride (KLOR-CON) 20 MEQ CR tablet 60 tablet 5     Sig: Take 1 tablet by mouth 2 (Two) Times a Day.        Pharmacy where request should be sent: [id number] [address] [facility] [address] [facility] [phone] [facility] [phone] FX     Additional details provided by patient: PATIENT STATED THAT HE TAKES 3 PILLS A DAY NOT 2    Does the patient have less than a 3 day supply:  [x] Yes  [] No    SiBrooke Farias Rep   01/09/23 14:06 EST

## 2023-01-18 RX ORDER — POTASSIUM CHLORIDE 20 MEQ/1
20 TABLET, EXTENDED RELEASE ORAL 3 TIMES DAILY
Qty: 90 TABLET | Refills: 1 | OUTPATIENT
Start: 2023-01-18

## 2023-01-18 RX ORDER — LOSARTAN POTASSIUM 25 MG/1
25 TABLET ORAL DAILY
Qty: 30 TABLET | Refills: 5 | Status: SHIPPED | OUTPATIENT
Start: 2023-01-18

## 2023-01-18 NOTE — TELEPHONE ENCOUNTER
Caller: Bala Staley III    Relationship: Self    Best call back number: [phone]    Requested Prescriptions:   Requested Prescriptions     Pending Prescriptions Disp Refills   • potassium chloride (KLOR-CON) 20 MEQ CR tablet 90 tablet 1     Sig: Take 1 tablet by mouth 3 (Three) Times a Day.   • losartan (COZAAR) 25 MG tablet 30 tablet 5     Sig: Take 1 tablet by mouth Daily.        Pharmacy where request should be sent: [id number] [address]  - [phone]  - [phone] FX     Additional details provided by patient:     Does the patient have less than a 3 day supply:  [x] Yes  [] No    Would you like a call back once the refill request has been completed: [] Yes [x] No    If the office needs to give you a call back, can they leave a voicemail: [x] Yes [] No    Brooke Madrigal Rep   01/18/23 14:34 EST

## 2023-01-19 RX ORDER — METOCLOPRAMIDE 10 MG/1
10 TABLET ORAL 2 TIMES DAILY
Qty: 60 TABLET | Refills: 5 | Status: SHIPPED | OUTPATIENT
Start: 2023-01-19

## 2023-01-19 NOTE — TELEPHONE ENCOUNTER
Caller: Bala Staley III    Relationship: Self    Best call back number: [phone]    Requested Prescriptions:   Requested Prescriptions     Pending Prescriptions Disp Refills   • metoclopramide (REGLAN) 10 MG tablet 60 tablet 5     Sig: Take 1 tablet by mouth 2 (Two) Times a Day.        Pharmacy where request should be sent: [facility] #[id number] - [place], [place] - [address]  - [phone]  - [phone] FX     Additional details provided by patient: PATIENT IS OUT OF THIS MEDICATION.    Does the patient have less than a 3 day supply:  [x] Yes  [] No    Would you like a call back once the refill request has been completed: [x] Yes [] No    If the office needs to give you a call back, can they leave a voicemail: [x] Yes [] No    Brooke Stephens Rep   01/19/23 15:49 EST    [facility]

## 2023-01-23 ENCOUNTER — TELEPHONE (OUTPATIENT)
Dept: INTERNAL MEDICINE | Facility: CLINIC | Age: 63
End: 2023-01-23

## 2023-01-23 RX ORDER — NAPROXEN 500 MG/1
500 TABLET ORAL 2 TIMES DAILY WITH MEALS
Qty: 14 TABLET | Refills: 0 | Status: SHIPPED | OUTPATIENT
Start: 2023-01-23 | End: 2023-08-30

## 2023-01-23 NOTE — TELEPHONE ENCOUNTER
Caller: Bala Staley III    Relationship: Self    Best call back number: [phone]      What was the call regarding: PATIENT IS SUFFERING FROM A GOUT FLARE UP, STATES HE WAS GIVEN MEDICATION BEFORE BUT THAT IT DID NOT WORK, REQUESTING ANY MEDICATION BUT THE LAST ONE TO HELP EASE HIS PAIN. PAIN STARTED SATURDAY MORNING    Do you require a callback: YES    [id number] - [place], [place] - [address]  - [phone]  - [phone]   [phone]

## 2023-02-06 ENCOUNTER — TELEPHONE (OUTPATIENT)
Dept: INTERNAL MEDICINE | Facility: CLINIC | Age: 63
End: 2023-02-06

## 2023-02-06 DIAGNOSIS — M10.9 ACUTE GOUT INVOLVING TOE OF LEFT FOOT, UNSPECIFIED CAUSE: ICD-10-CM

## 2023-02-06 RX ORDER — ALLOPURINOL 100 MG/1
100 TABLET ORAL DAILY
Qty: 30 TABLET | Refills: 5 | Status: SHIPPED | OUTPATIENT
Start: 2023-02-06 | End: 2023-07-21

## 2023-02-06 RX ORDER — POTASSIUM CHLORIDE 20 MEQ/1
20 TABLET, EXTENDED RELEASE ORAL 3 TIMES DAILY
Qty: 90 TABLET | Refills: 1 | Status: SHIPPED | OUTPATIENT
Start: 2023-02-06 | End: 2023-04-03

## 2023-02-06 NOTE — TELEPHONE ENCOUNTER
Caller: Bala Staley III    Relationship: Self    Best call back number: [phone]    Requested Prescriptions:   Requested Prescriptions     Pending Prescriptions Disp Refills   • potassium chloride (KLOR-CON) 20 MEQ CR tablet 90 tablet 1     Sig: Take 1 tablet by mouth 3 (Three) Times a Day.     ATORVASTATIN 40MG    Pharmacy where request should be sent: [facility] #[id number] - [place], [place] - [address]  - [phone]  - [phone] FX     Additional details provided by patient: PATIENT IS OUT     Does the patient have less than a 3 day supply:  [x] Yes  [] No    Would you like a call back once the refill request has been completed: [] Yes [x] No    If the office needs to give you a call back, can they leave a voicemail: [x] Yes [] No    Cadance Dunaway, RegSched Rep   02/06/23 10:37 EST

## 2023-02-06 NOTE — TELEPHONE ENCOUNTER
I sent allopurinol to pharmacy to prevent gout.  He should take colchicine with it for the first 10-14 days to protect from getting gout.  After that, continue allopurinol and just take colchicine as needed.

## 2023-02-06 NOTE — TELEPHONE ENCOUNTER
Caller: Bala Staley III    Relationship: Self    Best call back number: [phone]    What medication are you requesting: SOMETHING FOR SYMPTOMS     What are your current symptoms: PAIN IN LEFT TOE     How long have you been experiencing symptoms: THE PAST 4 MONTHS     Have you had these symptoms before:    [x] Yes  [] No    Have you been treated for these symptoms before:   [x] Yes  [] No    If a prescription is needed, what is your preferred pharmacy and phone number: [id number] - [place], [place] - [address]  - [phone] [facility] [phone] FX     Additional notes: PATIENT STATES HE HAS HAD GOUT 4 TIMES IN THE LAST 4 MONTHS AND WOULD LIKE TO SEE IF THERE IS ANY MEDICATION THAT CAN PREVENT THIS FROM HAPPENING.

## 2023-02-08 RX ORDER — ATORVASTATIN CALCIUM 40 MG/1
40 TABLET, FILM COATED ORAL DAILY
Qty: 30 TABLET | Refills: 5 | Status: SHIPPED | OUTPATIENT
Start: 2023-02-08

## 2023-02-08 RX ORDER — COLCHICINE 0.6 MG/1
TABLET ORAL
Qty: 30 TABLET | Refills: 0 | Status: SHIPPED | OUTPATIENT
Start: 2023-02-08 | End: 2023-03-06

## 2023-02-08 NOTE — TELEPHONE ENCOUNTER
Patient called stating that the pharmacy says they did not get our prescription for the requested potassium chloride, I do see that there was electronic confirmation but patient states as of yesterday they denied having gotten it    This note also has a request for atorvastatin to be called in for the patient, he states that he has taken it regularly as usual but he only has 2 days left, I see this is a medication that was previously cancelled from his med list, Please advise

## 2023-02-08 NOTE — TELEPHONE ENCOUNTER
Carlos tstated he has 2 days left of  Atorvastatin and needs a refill it was D/C'd by Jenny on 10/3/22 per Therapy Completed .   Please advise     The allopurinol has been addressed in another phone note

## 2023-03-06 DIAGNOSIS — M10.9 ACUTE GOUT INVOLVING TOE OF LEFT FOOT, UNSPECIFIED CAUSE: ICD-10-CM

## 2023-03-06 RX ORDER — COLCHICINE 0.6 MG/1
TABLET ORAL
Qty: 30 TABLET | Refills: 1 | Status: SHIPPED | OUTPATIENT
Start: 2023-03-06

## 2023-03-07 RX ORDER — ERGOCALCIFEROL 1.25 MG/1
50000 CAPSULE ORAL
Qty: 4 CAPSULE | Refills: 5 | Status: SHIPPED | OUTPATIENT
Start: 2023-03-07

## 2023-03-07 RX ORDER — LANOLIN ALCOHOL/MO/W.PET/CERES
325 CREAM (GRAM) TOPICAL 2 TIMES DAILY
Qty: 180 TABLET | Refills: 1 | Status: SHIPPED | OUTPATIENT
Start: 2023-03-07

## 2023-03-07 NOTE — TELEPHONE ENCOUNTER
"  Caller: LuísBala III \"Mueller\"    Relationship: Self    Best call back number: [phone]    Requested Prescriptions:   Requested Prescriptions     Pending Prescriptions Disp Refills   • ferrous sulfate 325 (65 FE) MG EC tablet 60 tablet 5   • vitamin D (ERGOCALCIFEROL) 1.25 MG (25109 UT) capsule capsule 4 capsule 5     Sig: Take 1 capsule by mouth Every 30 (Thirty) Days.        Pharmacy where request should be sent: [facility][id number] [place], [place] - [address]  - [phone]  - [phone] FX     Additional details provided by patient: PATIENT STATES THAT HE NEEDS THESE  REFILLED.    Does the patient have less than a 3 day supply:  [x] Yes  [] No    Would you like a call back once the refill request has been completed: [x] Yes [] No    If the office needs to give you a call back, can they leave a voicemail: [x] Yes [] No    Brooke Treadwell Rep   03/07/23 08:44 EST         "

## 2023-03-09 ENCOUNTER — OFFICE VISIT (OUTPATIENT)
Dept: INTERNAL MEDICINE | Facility: CLINIC | Age: 63
End: 2023-03-09
Payer: COMMERCIAL

## 2023-03-09 ENCOUNTER — HOSPITAL ENCOUNTER (OUTPATIENT)
Dept: GENERAL RADIOLOGY | Facility: HOSPITAL | Age: 63
Discharge: HOME | End: 2023-03-09
Admitting: INTERNAL MEDICINE
Payer: COMMERCIAL

## 2023-03-09 VITALS
HEART RATE: 76 BPM | WEIGHT: 250.8 LBS | DIASTOLIC BLOOD PRESSURE: 78 MMHG | HEIGHT: 70 IN | SYSTOLIC BLOOD PRESSURE: 148 MMHG | BODY MASS INDEX: 35.9 KG/M2 | TEMPERATURE: 98.4 F

## 2023-03-09 DIAGNOSIS — M79.672 LEFT FOOT PAIN: ICD-10-CM

## 2023-03-09 DIAGNOSIS — M10.072 ACUTE IDIOPATHIC GOUT INVOLVING TOE OF LEFT FOOT: ICD-10-CM

## 2023-03-09 DIAGNOSIS — R60.0 LOWER EXTREMITY EDEMA: Primary | ICD-10-CM

## 2023-03-09 DIAGNOSIS — I10 ESSENTIAL HYPERTENSION: ICD-10-CM

## 2023-03-09 PROCEDURE — 99214 OFFICE O/P EST MOD 30 MIN: CPT | Performed by: INTERNAL MEDICINE

## 2023-03-09 PROCEDURE — 73630 X-RAY EXAM OF FOOT: CPT

## 2023-03-09 RX ORDER — FUROSEMIDE 20 MG/1
20 TABLET ORAL 2 TIMES DAILY
Qty: 30 TABLET | Refills: 1 | Status: SHIPPED | OUTPATIENT
Start: 2023-03-09 | End: 2023-04-03

## 2023-03-09 NOTE — PROGRESS NOTES
[facility]     Bala BOONE [facility]  1960   [id number]      Patient Care Team:  Wilfred Kim MD as PCP - General (Internal Medicine)    Chief Complaint::   Chief Complaint   Patient presents with   • Foot Swelling   • Leg Swelling        HPI  The patient comes in complaining of swelling in both his feet and ankles.    Acute gout  The patient has a history of gout on the toes of his left foot. He expresses having difficulty ambulating due to gout in his toes, which also causes pain in his back. He denies any improvement in his gout. He claims to have noticed skin color changes in the affected area. He reports having difficulty bending his big toe without experiencing pain. He states that he has refrained from drinking alcohol for approximately 60 days.     Lower extremity edema  The patient reports having bilateral edema in both lower extremities and notes that the left is worse than the right. He mentions having surgery approximately 4 years ago due to arthritis. He was taking diuretics in the past and notes that he is no longer taking them.     Stress  The patient expresses experiencing stress for a few months now.     General health maintenance  The patient admits that he has gained a significant amount of weight. He is taking a potassium supplement, 3 pills daily. He states that his breathing is going well. He mentions having had surgery done on his right wrist and notes having difficulty lifting weights and doing physical activities with it. He explains that he was told that his right wrist would take approximately a year to fully recover but notes that he no longer feels crepitus on it.     Chronic Conditions:      Patient Active Problem List   Diagnosis   • Essential hypertension   • Mixed hyperlipidemia   • Class 2 obesity due to excess calories without serious comorbidity in adult   • Reactive depression   • Gastroesophageal reflux disease without esophagitis   • Vitamin D  deficiency   • Chronic low back pain   • Dyspnea on exertion   • Syncope, tussive   • Hypokalemia   • Cough syncope   • Allergic rhinitis   • Cough   • Hypercholesterolemia   • Iron deficiency anemia   • Vasovagal syncope   • Hernia of abdominal cavity   • Right sided sciatica   • Hepatic steatosis   • Acute gout involving toe of left foot        Past Medical History:   Diagnosis Date   • Anemia     iron deficiency   • Chronic low back pain 3/14/2018   • Colonic polyp    • ED (erectile dysfunction)    • Esophagitis    • Essential hypertension 3/14/2018   • Gastroesophageal reflux disease without esophagitis 3/14/2018   • Hernia of abdominal cavity    • Hyperlipidemia    • Reactive depression 3/14/2018   • Tennis elbow    • Vitamin D deficiency 3/14/2018       Past Surgical History:   Procedure Laterality Date   • APPENDECTOMY     • CARDIAC CATHETERIZATION  2011   • COLONOSCOPY     • ELBOW ARTHROPLASTY      Right    • ENDOSCOPY  2011   • ENDOSCOPY  2008    with biopsy   • ESOPHAGEAL MOTILITY STUDY N/A 06/27/2019    Procedure: MANOMETRY;  Surgeon: Mark Lowery MD;  Location: [facility] ENDOSCOPY;  Service: Gastroenterology   • FOOT SURGERY      left foot    • UPPER GASTROINTESTINAL ENDOSCOPY         Family History   Problem Relation Age of Onset   • Alzheimer's disease Mother    • Colon polyps Father    • Heart disease Father    • Heart attack Father    • Colon cancer Father    • Coronary artery disease Father    • No Known Problems Sister    • Cancer Paternal Grandmother    • Lung cancer Paternal Grandmother    • Esophageal cancer Neg Hx        Social History     Socioeconomic History   • Marital status:    Tobacco Use   • Smoking status: Never   • Smokeless tobacco: Former     Types: Chew     Quit date: 2002   Vaping Use   • Vaping Use: Never used   Substance and Sexual Activity   • Alcohol use: Yes     Alcohol/week: 2.0 standard drinks     Types: 2 Shots of liquor per week     Comment: per day, [place]     • Drug use: No   • Sexual activity: Yes     Partners: Female       Allergies   Allergen Reactions   • Sulfa Antibiotics Unknown (See Comments)     Pt unaware of this allergy   • Contrast Dye (Echo Or Unknown Ct/Mr) Rash         Current Outpatient Medications:   •  allopurinol (Zyloprim) 100 MG tablet, Take 1 tablet by mouth Daily., Disp: 30 tablet, Rfl: 5  •  aspirin 81 MG EC tablet, Take 1 tablet by mouth Daily., Disp: 30 tablet, Rfl: 11  •  atorvastatin (Lipitor) 40 MG tablet, Take 1 tablet by mouth Daily., Disp: 30 tablet, Rfl: 5  •  colchicine 0.6 MG tablet, TAKE 1 TABLET BY MOUTH DAILY, Disp: 30 tablet, Rfl: 1  •  esomeprazole (NexIUM) 40 MG packet, Take 1 capsule by mouth Every Night., Disp: , Rfl:   •  ferrous sulfate 325 (65 FE) MG EC tablet, Take 1 tablet by mouth 2 (Two) Times a Day. (Patient taking differently: Take 1 tablet by mouth Daily With Breakfast.), Disp: 180 tablet, Rfl: 1  •  levocetirizine (XYZAL) 5 MG tablet, Take 1 tablet by mouth Every Evening., Disp: , Rfl:   •  losartan (COZAAR) 25 MG tablet, Take 1 tablet by mouth Daily., Disp: 30 tablet, Rfl: 5  •  MAGNESIUM OXIDE PO, Take 250 mg by mouth Daily., Disp: , Rfl:   •  metoclopramide (REGLAN) 10 MG tablet, Take 1 tablet by mouth 2 (Two) Times a Day., Disp: 60 tablet, Rfl: 5  •  naproxen (Naprosyn) 500 MG tablet, Take 1 tablet by mouth 2 (Two) Times a Day With Meals., Disp: 14 tablet, Rfl: 0  •  potassium chloride (KLOR-CON) 20 MEQ CR tablet, Take 1 tablet by mouth 3 (Three) Times a Day., Disp: 90 tablet, Rfl: 1  •  RA VITAMIN B-12 TR 1000 MCG tablet controlled-release, take 1 tablet by mouth once daily, Disp: 90 each, Rfl: 3  •  TURMERIC PO, Take 1 tablet by mouth Daily., Disp: , Rfl:   •  vitamin D (ERGOCALCIFEROL) 1.25 MG (95623 UT) capsule capsule, Take 1 capsule by mouth Every 30 (Thirty) Days., Disp: 4 capsule, Rfl: 5  •  furosemide (Lasix) 20 MG tablet, Take 1 tablet by mouth 2 (Two) Times a Day., Disp: 30 tablet, Rfl: 1    Review of  "Systems   Constitutional: Negative for chills, fatigue and fever.   HENT: Negative for congestion, ear pain and sinus pressure.    Respiratory: Negative for cough, chest tightness, shortness of breath and wheezing.    Cardiovascular: Negative for chest pain and palpitations.   Gastrointestinal: Negative for abdominal pain, blood in stool and constipation.   Skin: Negative for color change.   Allergic/Immunologic: Negative for environmental allergies.   Neurological: Negative for dizziness, speech difficulty and headache.   Psychiatric/Behavioral: Negative for decreased concentration. The patient is not nervous/anxious.         Vital Signs  Vitals:    03/09/23 1019   BP: 148/78   BP Location: Left arm   Patient Position: Sitting   Cuff Size: Large Adult   Pulse: 76   Temp: 98.4 °F (36.9 °C)   Weight: 114 kg (250 lb 12.8 oz)   Height: 177.8 cm (70\")   PainSc:   9   PainLoc: Toe  Comment: left       Physical Exam  Vitals reviewed.   Constitutional:       Appearance: He is well-developed. He is obese.   HENT:      Head: Normocephalic and atraumatic.   Eyes:      Pupils: Pupils are equal, round, and reactive to light.   Cardiovascular:      Rate and Rhythm: Normal rate and regular rhythm.      Heart sounds: Normal heart sounds.   Pulmonary:      Effort: Pulmonary effort is normal.      Breath sounds: Normal breath sounds.   Abdominal:      General: Bowel sounds are normal.      Palpations: Abdomen is soft.   Musculoskeletal:         General: Normal range of motion.      Cervical back: Normal range of motion.      Comments: He has 2+ pitting edema in the left lower extremity below the knee, and 1+ on the right. The left MTP joint is not inflamed, but the range of motion is certainly restricted.   Skin:     General: Skin is warm and dry.   Neurological:      Mental Status: He is alert and oriented to person, place, and time.          Procedures    ACE III MINI             Assessment/Plan:    Diagnoses and all orders for " this visit:    1. Lower extremity edema (Primary)    2. Left foot pain  -     XR Foot 3+ View Left; Future    3. Essential hypertension  -     Comprehensive Metabolic Panel; Future  -     CBC & Differential; Future  -     Microalbumin / Creatinine Urine Ratio - Urine, Clean Catch; Future  -     Urinalysis With Microscopic - Urine, Clean Catch; Future    4. Acute idiopathic gout involving toe of left foot  -     Uric Acid; Future    Other orders  -     furosemide (Lasix) 20 MG tablet; Take 1 tablet by mouth 2 (Two) Times a Day.  Dispense: 30 tablet; Refill: 1        1. Lower extremity edema  This has been a recurrent problem complicated by hypokalemia. We will resume furosemide. He is already taking 60 mEq of potassium daily to maintain his potassium. He will have his potassium checked next week.    2. Left foot pain  This is not acute gout at this time. My suspicion is that the left first MTP joint has fused.    3. Hypertension  The patient's hypertension is controlled.    4. History of gout  The patient's uric acid level is pending.    5. Possible proteinuria  A urinalysis done a few months ago showed some proteinuria. We will repeat the urinalysis and microalbumin-creatinine ratio. He may need 24-hour urine for proteinuria and a referral to nephrology.    Plan of care reviewed with patient at the conclusion of today's visit. Education was provided regarding diagnosis, management, and any prescribed or recommended OTC medications.Patient verbalizes understanding of and agreement with management plan.       Wilfred Kim MD     Transcribed from ambient dictation for Wilfred Kim MD by Tiago Juarez.  03/09/23   12:46 EST    Patient or patient representative verbalized consent to the visit recording.  I have personally performed the services described in this document as transcribed by the above individual, and it is both accurate and complete.

## 2023-03-10 DIAGNOSIS — M19.072 PRIMARY OSTEOARTHRITIS OF LEFT FOOT: Primary | ICD-10-CM

## 2023-03-13 RX ORDER — LANOLIN ALCOHOL/MO/W.PET/CERES
325 CREAM (GRAM) TOPICAL 2 TIMES DAILY
Qty: 180 TABLET | Refills: 1 | OUTPATIENT
Start: 2023-03-13

## 2023-03-13 NOTE — TELEPHONE ENCOUNTER
"  Caller: Bala Staley III \"Mueller\"    Relationship: Self    Best call back number: [phone]    What is the best time to reach you: ANYTIME    Who are you requesting to speak with (clinical staff, provider,  specific staff member): PROVIDER    What was the call regarding: PATIENT WOULD LIKE TO KNOW IF HE NEEDS TO CONTINUE TAKING GOUT MEDICATION. PLEASE ADVISE    Do you require a callback: YES        "

## 2023-03-13 NOTE — TELEPHONE ENCOUNTER
"Caller: Bala Staley III \"Mueller\"    Relationship: Self    Best call back number: [phone]    Requested Prescriptions:   Requested Prescriptions     Pending Prescriptions Disp Refills   • ferrous sulfate 325 (65 FE) MG EC tablet 180 tablet 1     Sig: Take 1 tablet by mouth 2 (Two) Times a Day.        Pharmacy where request should be sent: [id number] - [place], [place] - [address]  - [phone]  - [phone] FX     Additional details provided by patient: PHARMACY NEVER RECEIVED THE REFILL REQUEST    Does the patient have less than a 3 day supply:  [x] Yes  [] No    Would you like a call back once the refill request has been completed: [x] Yes [] No    If the office needs to give you a call back, can they leave a voicemail: [x] Yes [] No    Brooke Rowe Rep   03/13/23 10:54 EDT           "

## 2023-03-21 ENCOUNTER — LAB (OUTPATIENT)
Dept: LAB | Facility: HOSPITAL | Age: 63
End: 2023-03-21
Payer: COMMERCIAL

## 2023-03-21 DIAGNOSIS — M10.072 ACUTE IDIOPATHIC GOUT INVOLVING TOE OF LEFT FOOT: ICD-10-CM

## 2023-03-21 DIAGNOSIS — I10 ESSENTIAL HYPERTENSION: ICD-10-CM

## 2023-03-21 LAB
BILIRUB UR QL STRIP: NEGATIVE
CLARITY UR: CLEAR
COLOR UR: YELLOW
GLUCOSE UR STRIP-MCNC: NEGATIVE MG/DL
HGB UR QL STRIP.AUTO: NEGATIVE
KETONES UR QL STRIP: ABNORMAL
LEUKOCYTE ESTERASE UR QL STRIP.AUTO: ABNORMAL
NITRITE UR QL STRIP: NEGATIVE
PH UR STRIP.AUTO: 7 [PH] (ref 5–8)
PROT UR QL STRIP: NEGATIVE
SP GR UR STRIP: 1.02 (ref 1–1.03)
UROBILINOGEN UR QL STRIP: ABNORMAL

## 2023-03-21 PROCEDURE — 80053 COMPREHEN METABOLIC PANEL: CPT

## 2023-03-21 PROCEDURE — 84550 ASSAY OF BLOOD/URIC ACID: CPT

## 2023-03-21 PROCEDURE — 85025 COMPLETE CBC W/AUTO DIFF WBC: CPT

## 2023-03-21 PROCEDURE — 82043 UR ALBUMIN QUANTITATIVE: CPT

## 2023-03-21 PROCEDURE — 82570 ASSAY OF URINE CREATININE: CPT

## 2023-03-21 PROCEDURE — 81001 URINALYSIS AUTO W/SCOPE: CPT

## 2023-03-22 LAB
ALBUMIN SERPL-MCNC: 4.8 G/DL (ref 3.5–5.2)
ALBUMIN UR-MCNC: 1.3 MG/DL
ALBUMIN/GLOB SERPL: 1.7 G/DL
ALP SERPL-CCNC: 80 U/L (ref 39–117)
ALT SERPL W P-5'-P-CCNC: 107 U/L (ref 1–41)
AMORPH URATE CRY URNS QL MICRO: NORMAL /HPF
ANION GAP SERPL CALCULATED.3IONS-SCNC: 10 MMOL/L (ref 5–15)
AST SERPL-CCNC: 66 U/L (ref 1–40)
BACTERIA UR QL AUTO: NORMAL /HPF
BASOPHILS # BLD AUTO: 0.03 10*3/MM3 (ref 0–0.2)
BASOPHILS NFR BLD AUTO: 0.6 % (ref 0–1.5)
BILIRUB SERPL-MCNC: 0.9 MG/DL (ref 0–1.2)
BUN SERPL-MCNC: 15 MG/DL (ref 8–23)
BUN/CREAT SERPL: 15.8 (ref 7–25)
CALCIUM SPEC-SCNC: 10 MG/DL (ref 8.6–10.5)
CHLORIDE SERPL-SCNC: 99 MMOL/L (ref 98–107)
CO2 SERPL-SCNC: 28 MMOL/L (ref 22–29)
CREAT SERPL-MCNC: 0.95 MG/DL (ref 0.76–1.27)
CREAT UR-MCNC: 112.3 MG/DL
DEPRECATED RDW RBC AUTO: 50.5 FL (ref 37–54)
EGFRCR SERPLBLD CKD-EPI 2021: 90.5 ML/MIN/1.73
EOSINOPHIL # BLD AUTO: 0.15 10*3/MM3 (ref 0–0.4)
EOSINOPHIL NFR BLD AUTO: 2.9 % (ref 0.3–6.2)
ERYTHROCYTE [DISTWIDTH] IN BLOOD BY AUTOMATED COUNT: 14.1 % (ref 12.3–15.4)
GLOBULIN UR ELPH-MCNC: 2.9 GM/DL
GLUCOSE SERPL-MCNC: 89 MG/DL (ref 65–99)
HCT VFR BLD AUTO: 54.8 % (ref 37.5–51)
HGB BLD-MCNC: 19.8 G/DL (ref 13–17.7)
HYALINE CASTS UR QL AUTO: NORMAL /LPF
IMM GRANULOCYTES # BLD AUTO: 0.02 10*3/MM3 (ref 0–0.05)
IMM GRANULOCYTES NFR BLD AUTO: 0.4 % (ref 0–0.5)
LYMPHOCYTES # BLD AUTO: 0.92 10*3/MM3 (ref 0.7–3.1)
LYMPHOCYTES NFR BLD AUTO: 17.8 % (ref 19.6–45.3)
MCH RBC QN AUTO: 35.3 PG (ref 26.6–33)
MCHC RBC AUTO-ENTMCNC: 36.1 G/DL (ref 31.5–35.7)
MCV RBC AUTO: 97.7 FL (ref 79–97)
MICROALBUMIN/CREAT UR: 11.6 MG/G
MONOCYTES # BLD AUTO: 0.65 10*3/MM3 (ref 0.1–0.9)
MONOCYTES NFR BLD AUTO: 12.6 % (ref 5–12)
NEUTROPHILS NFR BLD AUTO: 3.4 10*3/MM3 (ref 1.7–7)
NEUTROPHILS NFR BLD AUTO: 65.7 % (ref 42.7–76)
NRBC BLD AUTO-RTO: 0 /100 WBC (ref 0–0.2)
PLATELET # BLD AUTO: 145 10*3/MM3 (ref 140–450)
PMV BLD AUTO: 11 FL (ref 6–12)
POTASSIUM SERPL-SCNC: 4.2 MMOL/L (ref 3.5–5.2)
PROT SERPL-MCNC: 7.7 G/DL (ref 6–8.5)
RBC # BLD AUTO: 5.61 10*6/MM3 (ref 4.14–5.8)
RBC # UR STRIP: NORMAL /HPF
REF LAB TEST METHOD: NORMAL
SODIUM SERPL-SCNC: 137 MMOL/L (ref 136–145)
SQUAMOUS #/AREA URNS HPF: NORMAL /HPF
URATE SERPL-MCNC: 4.4 MG/DL (ref 3.4–7)
WBC # UR STRIP: NORMAL /HPF
WBC NRBC COR # BLD: 5.17 10*3/MM3 (ref 3.4–10.8)

## 2023-03-26 DIAGNOSIS — E87.6 HYPOKALEMIA: Primary | ICD-10-CM

## 2023-03-27 ENCOUNTER — OFFICE VISIT (OUTPATIENT)
Dept: INTERNAL MEDICINE | Facility: CLINIC | Age: 63
End: 2023-03-27
Payer: COMMERCIAL

## 2023-03-27 VITALS
HEIGHT: 70 IN | BODY MASS INDEX: 35.3 KG/M2 | DIASTOLIC BLOOD PRESSURE: 68 MMHG | SYSTOLIC BLOOD PRESSURE: 122 MMHG | TEMPERATURE: 98.4 F | HEART RATE: 104 BPM | WEIGHT: 246.6 LBS

## 2023-03-27 DIAGNOSIS — I10 ESSENTIAL HYPERTENSION: ICD-10-CM

## 2023-03-27 DIAGNOSIS — Z00.00 PREVENTATIVE HEALTH CARE: Primary | ICD-10-CM

## 2023-03-27 DIAGNOSIS — K76.0 HEPATIC STEATOSIS: ICD-10-CM

## 2023-03-27 DIAGNOSIS — Z12.5 PROSTATE CANCER SCREENING: ICD-10-CM

## 2023-03-27 DIAGNOSIS — E55.9 VITAMIN D DEFICIENCY: ICD-10-CM

## 2023-03-27 DIAGNOSIS — M1A.09X0 IDIOPATHIC CHRONIC GOUT OF MULTIPLE SITES WITHOUT TOPHUS: ICD-10-CM

## 2023-03-27 DIAGNOSIS — K21.9 GASTROESOPHAGEAL REFLUX DISEASE WITHOUT ESOPHAGITIS: ICD-10-CM

## 2023-03-27 DIAGNOSIS — E87.6 HYPOKALEMIA: ICD-10-CM

## 2023-03-27 DIAGNOSIS — E78.2 MIXED HYPERLIPIDEMIA: ICD-10-CM

## 2023-03-27 PROCEDURE — 99396 PREV VISIT EST AGE 40-64: CPT | Performed by: INTERNAL MEDICINE

## 2023-03-27 RX ORDER — NEOMYCIN SULFATE, POLYMYXIN B SULFATE AND DEXAMETHASONE 3.5; 10000; 1 MG/ML; [USP'U]/ML; MG/ML
1 SUSPENSION/ DROPS OPHTHALMIC 2 TIMES DAILY
COMMUNITY
Start: 2023-03-22

## 2023-03-27 NOTE — PROGRESS NOTES
[facility]     Bala MELY [facility] III  1960   [id number]      Patient Care Team:  Wilfred Kim MD as PCP - General (Internal Medicine)    Chief Complaint::   Chief Complaint   Patient presents with   • Annual Exam        HPI    The patient presents today for an annual examination and for follow-up of hypertension, hyperlipidemia, vitamin D deficiency, GERD, fatty liver, gout, and hypokalemia.    Foot pain  The patient states that his foot is doing much better. His swelling has improved.     Chronic hypokalemia  The patient states that he is still taking the water pill.     Gout  The patient states that he is still taking allopurinol.    Chronic Conditions:  Hypertension, hyperlipidemia, vitamin D deficiency, GERD, fatty liver and hypokalemia.    Patient Active Problem List   Diagnosis   • Essential hypertension   • Mixed hyperlipidemia   • Class 2 obesity due to excess calories without serious comorbidity in adult   • Reactive depression   • Gastroesophageal reflux disease without esophagitis   • Vitamin D deficiency   • Chronic low back pain   • Dyspnea on exertion   • Syncope, tussive   • Hypokalemia   • Cough syncope   • Allergic rhinitis   • Cough   • Hypercholesterolemia   • Iron deficiency anemia   • Vasovagal syncope   • Hernia of abdominal cavity   • Right sided sciatica   • Hepatic steatosis   • Acute gout involving toe of left foot   • Idiopathic chronic gout of multiple sites without tophus        Past Medical History:   Diagnosis Date   • Anemia     iron deficiency   • Chronic low back pain 3/14/2018   • Colonic polyp    • ED (erectile dysfunction)    • Esophagitis    • Essential hypertension 3/14/2018   • Gastroesophageal reflux disease without esophagitis 3/14/2018   • Hernia of abdominal cavity    • Hyperlipidemia    • Reactive depression 3/14/2018   • Tennis elbow    • Vitamin D deficiency 3/14/2018       Past Surgical History:   Procedure Laterality Date   • APPENDECTOMY      • CARDIAC CATHETERIZATION  2011   • COLONOSCOPY     • ELBOW ARTHROPLASTY      Right    • ENDOSCOPY  2011   • ENDOSCOPY  2008    with biopsy   • ESOPHAGEAL MOTILITY STUDY N/A 06/27/2019    Procedure: MANOMETRY;  Surgeon: Mark Lowery MD;  Location: [facility] ENDOSCOPY;  Service: Gastroenterology   • FOOT SURGERY      left foot    • UPPER GASTROINTESTINAL ENDOSCOPY         Family History   Problem Relation Age of Onset   • Alzheimer's disease Mother    • Colon polyps Father    • Heart disease Father    • Heart attack Father    • Colon cancer Father    • Coronary artery disease Father    • No Known Problems Sister    • Cancer Paternal Grandmother    • Lung cancer Paternal Grandmother    • Esophageal cancer Neg Hx        Social History     Socioeconomic History   • Marital status:    Tobacco Use   • Smoking status: Never   • Smokeless tobacco: Former     Types: Chew     Quit date: 2002   Vaping Use   • Vaping Use: Never used   Substance and Sexual Activity   • Alcohol use: Yes     Alcohol/week: 2.0 standard drinks     Types: 2 Shots of liquor per week     Comment: per day, [place]    • Drug use: No   • Sexual activity: Yes     Partners: Female       Allergies   Allergen Reactions   • Sulfa Antibiotics Unknown (See Comments)     Pt unaware of this allergy   • Contrast Dye (Echo Or Unknown Ct/Mr) Rash         Current Outpatient Medications:   •  allopurinol (Zyloprim) 100 MG tablet, Take 1 tablet by mouth Daily., Disp: 30 tablet, Rfl: 5  •  aspirin 81 MG EC tablet, Take 1 tablet by mouth Daily., Disp: 30 tablet, Rfl: 11  •  atorvastatin (Lipitor) 40 MG tablet, Take 1 tablet by mouth Daily., Disp: 30 tablet, Rfl: 5  •  colchicine 0.6 MG tablet, TAKE 1 TABLET BY MOUTH DAILY, Disp: 30 tablet, Rfl: 1  •  esomeprazole (NexIUM) 40 MG packet, Take 1 capsule by mouth Every Night., Disp: , Rfl:   •  ferrous sulfate 325 (65 FE) MG EC tablet, Take 1 tablet by mouth 2 (Two) Times a Day. (Patient taking differently: Take 1  tablet by mouth Daily With Breakfast.), Disp: 180 tablet, Rfl: 1  •  furosemide (Lasix) 20 MG tablet, Take 1 tablet by mouth 2 (Two) Times a Day., Disp: 30 tablet, Rfl: 1  •  levocetirizine (XYZAL) 5 MG tablet, Take 1 tablet by mouth Every Evening., Disp: , Rfl:   •  losartan (COZAAR) 25 MG tablet, Take 1 tablet by mouth Daily., Disp: 30 tablet, Rfl: 5  •  MAGNESIUM OXIDE PO, Take 250 mg by mouth Daily., Disp: , Rfl:   •  metoclopramide (REGLAN) 10 MG tablet, Take 1 tablet by mouth 2 (Two) Times a Day., Disp: 60 tablet, Rfl: 5  •  naproxen (Naprosyn) 500 MG tablet, Take 1 tablet by mouth 2 (Two) Times a Day With Meals., Disp: 14 tablet, Rfl: 0  •  neomycin-polymyxin-dexamethasone (MAXITROL) 3.5-43205-5.1 ophthalmic suspension, Administer 1 drop to both eyes 2 (Two) Times a Day., Disp: , Rfl:   •  potassium chloride (KLOR-CON) 20 MEQ CR tablet, Take 1 tablet by mouth 3 (Three) Times a Day., Disp: 90 tablet, Rfl: 1  •  RA VITAMIN B-12 TR 1000 MCG tablet controlled-release, take 1 tablet by mouth once daily, Disp: 90 each, Rfl: 3  •  TURMERIC PO, Take 1 tablet by mouth Daily., Disp: , Rfl:   •  vitamin D (ERGOCALCIFEROL) 1.25 MG (69794 UT) capsule capsule, Take 1 capsule by mouth Every 30 (Thirty) Days., Disp: 4 capsule, Rfl: 5    Immunization History   Administered Date(s) Administered   • COVID-19 (PFIZER) PURPLE CAP 03/11/2021, 04/27/2021, 01/04/2022   • Flu Vaccine Intradermal Quad 18-64YR 10/25/2018   • Flu Vaccine Quad PF >36MO 10/04/2017   • FluLaval/Fluzone >6mos 09/18/2020   • Flublok 18+yrs 11/30/2021, 12/09/2022   • Fluzone Quad >6mos (Multi-dose) 11/25/2015   • Hepatitis A 09/05/2019, 09/18/2020   • Influenza Quad Vaccine (Inpatient) 11/25/2015   • Influenza TIV (IM) 09/08/2009, 10/31/2014   • Influenza, Unspecified 10/25/2018   • Tdap 03/03/2011, 07/17/2014   • Zostavax 11/25/2015   • flucelvax quad pfs =>4 YRS 10/11/2019        Health Maintenance Due   Topic Date Due   • Pneumococcal Vaccine 0-64 (1 -  "PCV) Never done   • ZOSTER VACCINE (2 of 3) 01/20/2016   • COVID-19 Vaccine (4 - Booster for Pfizer series) 03/01/2022   • LIPID PANEL  03/04/2023   • ANNUAL PHYSICAL  03/07/2023        Review of Systems   Review of systems is otherwise unremarkable.    Vital Signs  Vitals:    03/27/23 1001   BP: 122/68   BP Location: Left arm   Patient Position: Sitting   Cuff Size: Large Adult   Pulse: 104   Temp: 98.4 °F (36.9 °C)   Weight: 112 kg (246 lb 9.6 oz)   Height: 179 cm (70.47\")   PainSc:   5   PainLoc: Toe       Physical Exam  Vitals and nursing note reviewed.   Constitutional:       Appearance: He is well-developed and overweight.   HENT:      Head: Normocephalic and atraumatic.      Right Ear: External ear normal.      Left Ear: External ear normal.      Nose: Nose normal.      Mouth/Throat:      Pharynx: No oropharyngeal exudate.   Eyes:      Conjunctiva/sclera: Conjunctivae normal.      Pupils: Pupils are equal, round, and reactive to light.   Neck:      Thyroid: No thyromegaly.      Vascular: No JVD.   Cardiovascular:      Rate and Rhythm: Normal rate and regular rhythm.      Heart sounds: Normal heart sounds. No murmur heard.    No friction rub. No gallop.   Pulmonary:      Effort: Pulmonary effort is normal. No respiratory distress.      Breath sounds: Normal breath sounds. No wheezing or rales.   Chest:      Chest wall: No tenderness.   Abdominal:      General: Bowel sounds are normal. There is no distension.      Palpations: Abdomen is soft. There is no mass.      Tenderness: There is no abdominal tenderness. There is no guarding or rebound.      Hernia: No hernia is present.   Musculoskeletal:         General: No tenderness. Normal range of motion.      Cervical back: Normal range of motion and neck supple.   Lymphadenopathy:      Cervical: No cervical adenopathy.   Skin:     General: Skin is warm and dry.      Findings: No erythema or rash.   Neurological:      Mental Status: He is alert and oriented to " person, place, and time.      Cranial Nerves: No cranial nerve deficit.      Sensory: No sensory deficit.      Motor: No abnormal muscle tone.      Coordination: Coordination normal.      Deep Tendon Reflexes: Reflexes normal.   Psychiatric:         Behavior: Behavior normal.         Thought Content: Thought content normal.         Judgment: Judgment normal.          Procedures     Fall Risk Screen:  MARÍA Fall Risk Assessment has not been completed.    Health Habits and Functional and Cognitive Screening:  No flowsheet data found.    Depression [place]  PHQ-9 Total Score:       ACE III MINI             Assessment/Plan:    1. Prevention  - He is up to date on colorectal cancer screening and fully vaccinated against COVID-19. I suggested at some point that he have Shingrix.    2. Hypertension  - Blood pressure is well controlled on losartan.    3. Hyperlipidemia  - Lipid panel is pending. Continue statin.    4. Vitamin D deficiency  - Vitamin D level is pending. Continue vitamin D.    5. GERD  - GERD is well controlled on Nexium.    6. Fatty liver  - The treatment remains controlled with lipids and weight loss.    7. Gout  - Gout is currently asymptomatic. He will continue allopurinol. He will see orthopedic surgery for the osteoarthritis in first MTP joint.    8. Chronic hypokalemia  - He is currently on furosemide to control edema, but the hypokalemia has been independent of diuretics requiring a large dose of potassium to normalize, but serum potassium. Review of last summer's work reveals spot urine potassium in the 40s. There is no normal range for the lab, but based on reading that is excessive, we will refer to nephrology to see if there is some underlying RTA or other problem that might contribute to the potassium loss plus perhaps the unexplained intermittent edema.    He will follow up in 6 months.    BMI is >= 30 and <35. (Class 1 Obesity). The following options were offered after discussion;: exercise  counseling/recommendations and nutrition counseling/recommendations        Diagnoses and all orders for this visit:    1. Preventative health care (Primary)    2. Essential hypertension    3. Mixed hyperlipidemia  -     Lipid Panel; Future  -     Apolipoprotein B; Future    4. Vitamin D deficiency  -     Vitamin D,25-Hydroxy; Future    5. Gastroesophageal reflux disease without esophagitis    6. Hepatic steatosis    7. Idiopathic chronic gout of multiple sites without tophus    8. Hypokalemia    9. Prostate cancer screening  -     PSA Screen; Future            Labs  Results for orders placed or performed in visit on 03/21/23   Comprehensive Metabolic Panel    Specimen: Blood   Result Value Ref Range    Glucose 89 65 - 99 mg/dL    BUN 15 8 - 23 mg/dL    Creatinine 0.95 0.76 - 1.27 mg/dL    Sodium 137 136 - 145 mmol/L    Potassium 4.2 3.5 - 5.2 mmol/L    Chloride 99 98 - 107 mmol/L    CO2 28.0 22.0 - 29.0 mmol/L    Calcium 10.0 8.6 - 10.5 mg/dL    Total Protein 7.7 6.0 - 8.5 g/dL    Albumin 4.8 3.5 - 5.2 g/dL    ALT (SGPT) 107 (H) 1 - 41 U/L    AST (SGOT) 66 (H) 1 - 40 U/L    Alkaline Phosphatase 80 39 - 117 U/L    Total Bilirubin 0.9 0.0 - 1.2 mg/dL    Globulin 2.9 gm/dL    A/G Ratio 1.7 g/dL    BUN/Creatinine Ratio 15.8 7.0 - 25.0    Anion Gap 10.0 5.0 - 15.0 mmol/L    eGFR 90.5 >60.0 mL/min/1.73   Uric Acid    Specimen: Blood   Result Value Ref Range    Uric Acid 4.4 3.4 - 7.0 mg/dL   Microalbumin / Creatinine Urine Ratio - Urine, Clean Catch    Specimen: Urine, Clean Catch   Result Value Ref Range    Microalbumin/Creatinine Ratio 11.6 mg/g    Creatinine, Urine 112.3 mg/dL    Microalbumin, Urine 1.3 mg/dL   CBC Auto Differential    Specimen: Blood   Result Value Ref Range    WBC 5.17 3.40 - 10.80 10*3/mm3    RBC 5.61 4.14 - 5.80 10*6/mm3    Hemoglobin 19.8 (H) 13.0 - 17.7 g/dL    Hematocrit 54.8 (H) 37.5 - 51.0 %    MCV 97.7 (H) 79.0 - 97.0 fL    MCH 35.3 (H) 26.6 - 33.0 pg    MCHC 36.1 (H) 31.5 - 35.7 g/dL    RDW  14.1 12.3 - 15.4 %    RDW-SD 50.5 37.0 - 54.0 fl    MPV 11.0 6.0 - 12.0 fL    Platelets 145 140 - 450 10*3/mm3    Neutrophil % 65.7 42.7 - 76.0 %    Lymphocyte % 17.8 (L) 19.6 - 45.3 %    Monocyte % 12.6 (H) 5.0 - 12.0 %    Eosinophil % 2.9 0.3 - 6.2 %    Basophil % 0.6 0.0 - 1.5 %    Immature Grans % 0.4 0.0 - 0.5 %    Neutrophils, Absolute 3.40 1.70 - 7.00 10*3/mm3    Lymphocytes, Absolute 0.92 0.70 - 3.10 10*3/mm3    Monocytes, Absolute 0.65 0.10 - 0.90 10*3/mm3    Eosinophils, Absolute 0.15 0.00 - 0.40 10*3/mm3    Basophils, Absolute 0.03 0.00 - 0.20 10*3/mm3    Immature Grans, Absolute 0.02 0.00 - 0.05 10*3/mm3    nRBC 0.0 0.0 - 0.2 /100 WBC   Urinalysis without microscopic (no culture) - Urine, Clean Catch    Specimen: Urine, Clean Catch   Result Value Ref Range    Color, UA Yellow Yellow, Straw    Appearance, UA Clear Clear    pH, UA 7.0 5.0 - 8.0    Specific Gravity, UA 1.016 1.005 - 1.030    Glucose, UA Negative Negative    Ketones, UA Trace (A) Negative    Bilirubin, UA Negative Negative    Blood, UA Negative Negative    Protein, UA Negative Negative    Leuk Esterase, UA Trace (A) Negative    Nitrite, UA Negative Negative    Urobilinogen, UA 0.2 E.U./dL 0.2 - 1.0 E.U./dL   Urinalysis, Microscopic Only - Urine, Clean Catch    Specimen: Urine, Clean Catch   Result Value Ref Range    RBC, UA None Seen None Seen, 0-2 /HPF    WBC, UA 0-2 None Seen, 0-2 /HPF    Bacteria, UA None Seen None Seen /HPF    Squamous Epithelial Cells, UA 0-2 None Seen, 0-2 /HPF    Hyaline Casts, UA None Seen None Seen /LPF    Amorphous Crystals, UA Small/1+ None Seen /HPF    Methodology Manual Light Microscopy         Counseling was given to patient for the following topics: appropriate exercise 150 minutes/week, disease prevention and healthy eating habits.    Plan of care reviewed with patient at the conclusion of today's visit. Education was provided regarding diagnosis, management, and any prescribed or recommended OTC  medications.Patient verbalizes understanding of and agreement with management plan.         Wilfred Kim MD       Transcribed from ambient dictation for Wilfred Kim MD by Marshall Machado.  03/27/23   11:43 EDT    Patient or patient representative verbalized consent to the visit recording.  I have personally performed the services described in this document as transcribed by the above individual, and it is both accurate and complete.

## 2023-04-03 RX ORDER — FUROSEMIDE 20 MG/1
TABLET ORAL
Qty: 30 TABLET | Refills: 1 | Status: SHIPPED | OUTPATIENT
Start: 2023-04-03

## 2023-04-03 RX ORDER — POTASSIUM CHLORIDE 1500 MG/1
TABLET, EXTENDED RELEASE ORAL
Qty: 90 TABLET | Refills: 1 | Status: SHIPPED | OUTPATIENT
Start: 2023-04-03

## 2023-04-19 ENCOUNTER — OFFICE VISIT (OUTPATIENT)
Dept: ORTHOPEDIC SURGERY | Facility: CLINIC | Age: 63
End: 2023-04-19
Payer: COMMERCIAL

## 2023-04-19 VITALS
WEIGHT: 246 LBS | DIASTOLIC BLOOD PRESSURE: 98 MMHG | HEIGHT: 70 IN | BODY MASS INDEX: 35.22 KG/M2 | SYSTOLIC BLOOD PRESSURE: 164 MMHG

## 2023-04-19 DIAGNOSIS — M19.072 OSTEOARTHRITIS OF JOINT OF TOE OF LEFT FOOT: Primary | ICD-10-CM

## 2023-04-19 DIAGNOSIS — M1A.0720 CHRONIC IDIOPATHIC GOUT INVOLVING TOE OF LEFT FOOT WITHOUT TOPHUS: ICD-10-CM

## 2023-04-19 DIAGNOSIS — M20.12 HALLUX VALGUS (ACQUIRED), LEFT FOOT: ICD-10-CM

## 2023-04-19 NOTE — PROGRESS NOTES
[facility] Visit     Office Visit       Date: 04/19/2023   Patient Name: Bala Staley III  MRN: [id number]  YOB: 1960    Referring Physician: Wilfred Kim, *     Chief Complaint:   Chief Complaint   Patient presents with   • Left Foot - Pain     History of Present Illness:   Bala Staley III is a 62 y.o. male who presents with new problem of: left foot pain.  Onset: atraumatic and gradual in nature. The issue has been ongoing for 6 month(s). Pain is a 6/10 on the pain scale. Pain is described as throbbing. Associated symptoms include stiffness. The pain is worse with walking, climbing stairs and any movement of the joint; resting and sitting improve the pain. Previous treatments have included: NSAIDS.    Subjective   Review of Systems: Review of Systems   Constitutional: Negative for chills, fever, unexpected weight gain and unexpected weight loss.   HENT: Negative for congestion, postnasal drip and rhinorrhea.    Eyes: Negative for blurred vision.   Respiratory: Negative for shortness of breath.    Cardiovascular: Negative for leg swelling.   Gastrointestinal: Negative for abdominal pain, nausea and vomiting.   Genitourinary: Negative for difficulty urinating.   Musculoskeletal: Positive for arthralgias. Negative for gait problem, joint swelling and myalgias.   Skin: Negative for skin lesions and wound.   Neurological: Negative for dizziness, weakness, light-headedness and numbness.   Hematological: Does not bruise/bleed easily.   Psychiatric/Behavioral: Negative for depressed mood.        I have reviewed the following portions of the patient's history:History of Present Illness and review of systems.    Past Medical History:   Past Medical History:   Diagnosis Date   • Anemia     iron deficiency   • Arthritis of back 2003   • Chronic low back pain 03/14/2018   • Colonic polyp    • ED (erectile dysfunction)    • Esophagitis    •  Essential hypertension 03/14/2018   • Gastroesophageal reflux disease without esophagitis 03/14/2018   • Hernia of abdominal cavity    • Hip arthrosis 2003   • Hyperlipidemia    • Low back strain     off and on   • Periarthritis of shoulder 2020   • Reactive depression 03/14/2018   • Rotator cuff syndrome 2020   • Tennis elbow    • Vitamin D deficiency 03/14/2018       Past Surgical History:   Past Surgical History:   Procedure Laterality Date   • APPENDECTOMY     • CARDIAC CATHETERIZATION  2011   • COLONOSCOPY     • ELBOW ARTHROPLASTY      Right    • ELBOW PROCEDURE  2003   • ENDOSCOPY  2011   • ENDOSCOPY  2008    with biopsy   • ESOPHAGEAL MOTILITY STUDY N/A 06/27/2019    Procedure: MANOMETRY;  Surgeon: Mark Lowery MD;  Location: [facility] ENDOSCOPY;  Service: Gastroenterology   • FOOT SURGERY      left foot    • HAND SURGERY  2022   • UPPER GASTROINTESTINAL ENDOSCOPY     • WRIST SURGERY  2022       Family History:   Family History   Problem Relation Age of Onset   • Alzheimer's disease Mother    • Colon polyps Father    • Heart disease Father    • Heart attack Father    • Colon cancer Father    • Coronary artery disease Father    • No Known Problems Sister    • Cancer Paternal Grandmother    • Lung cancer Paternal Grandmother    • Esophageal cancer Neg Hx        Social History:   Social History     Socioeconomic History   • Marital status:    Tobacco Use   • Smoking status: Never   • Smokeless tobacco: Former     Types: Chew     Quit date: 2002   Vaping Use   • Vaping Use: Never used   Substance and Sexual Activity   • Alcohol use: Yes     Alcohol/week: 2.0 standard drinks     Types: 2 Shots of liquor per week     Comment: per day, [place]    • Drug use: No   • Sexual activity: Yes     Partners: Female     Birth control/protection: None       Medications:   Current Outpatient Medications:   •  allopurinol (Zyloprim) 100 MG tablet, Take 1 tablet by mouth Daily., Disp: 30 tablet, Rfl: 5  •  aspirin 81 MG  EC tablet, Take 1 tablet by mouth Daily., Disp: 30 tablet, Rfl: 11  •  atorvastatin (Lipitor) 40 MG tablet, Take 1 tablet by mouth Daily., Disp: 30 tablet, Rfl: 5  •  colchicine 0.6 MG tablet, TAKE 1 TABLET BY MOUTH DAILY, Disp: 30 tablet, Rfl: 1  •  esomeprazole (NexIUM) 40 MG packet, Take 1 capsule by mouth Every Night., Disp: , Rfl:   •  ferrous sulfate 325 (65 FE) MG EC tablet, Take 1 tablet by mouth 2 (Two) Times a Day. (Patient taking differently: Take 1 tablet by mouth Daily With Breakfast.), Disp: 180 tablet, Rfl: 1  •  furosemide (LASIX) 20 MG tablet, TAKE 1 TABLET BY MOUTH TWICE A DAY, Disp: 30 tablet, Rfl: 1  •  KLOR-CON 20 MEQ CR tablet, TAKE 1 TABLET BY MOUTH THREE TIMES A DAY, Disp: 90 tablet, Rfl: 1  •  levocetirizine (XYZAL) 5 MG tablet, Take 1 tablet by mouth Every Evening., Disp: , Rfl:   •  losartan (COZAAR) 25 MG tablet, Take 1 tablet by mouth Daily., Disp: 30 tablet, Rfl: 5  •  MAGNESIUM OXIDE PO, Take 250 mg by mouth Daily., Disp: , Rfl:   •  metoclopramide (REGLAN) 10 MG tablet, Take 1 tablet by mouth 2 (Two) Times a Day., Disp: 60 tablet, Rfl: 5  •  naproxen (Naprosyn) 500 MG tablet, Take 1 tablet by mouth 2 (Two) Times a Day With Meals., Disp: 14 tablet, Rfl: 0  •  neomycin-polymyxin-dexamethasone (MAXITROL) 3.5-79547-0.1 ophthalmic suspension, Administer 1 drop to both eyes 2 (Two) Times a Day., Disp: , Rfl:   •  RA VITAMIN B-12 TR 1000 MCG tablet controlled-release, take 1 tablet by mouth once daily, Disp: 90 each, Rfl: 3  •  TURMERIC PO, Take 1 tablet by mouth Daily., Disp: , Rfl:   •  vitamin D (ERGOCALCIFEROL) 1.25 MG (85330 UT) capsule capsule, Take 1 capsule by mouth Every 30 (Thirty) Days., Disp: 4 capsule, Rfl: 5    Allergies:   Allergies   Allergen Reactions   • Sulfa Antibiotics Unknown (See Comments)     Pt unaware of this allergy   • Contrast Dye (Echo Or Unknown Ct/Mr) Rash       I reviewed the patient's chief complaint, history of present illness, review of systems, past  "medical history, surgical history, family history, social history, medications and allergy list.     Objective    Vital Signs:   Vitals:    04/19/23 1037   BP: 164/98   Weight: 112 kg (246 lb)   Height: 179 cm (70.47\")     Body mass index is 34.83 kg/m².   BMI is >= 30 and <35. (Class 1 Obesity). The following options were offered after discussion;: exercise counseling/recommendations and nutrition counseling/recommendations     Patient reports that he is a non-smoker and has not ever been a smoker.  This behavior was applauded and he was encouraged to continue in smoking cessation.  We will continue to monitor at subsequent visits.    Ortho Exam:  General: Well-appearing, no acute distress  Left foot: No erythema ecchymosis or swelling.  There is chronic vascular color changes.  Hallux valgus present 1+ pitting edema to the level of the ankle.  Tender over the medial and dorsal aspect of the first MTP joint.  No tenderness over the sesamoids.  No metatarsal head pain in the other toes.  Full range of motion of the toes and ankle.  Pain with resisted great toe extension.  5/5 strength.  Sensation intact to light touch.  2+ dorsalis pedis pulse.    Results Review:   Imaging Results (Last 24 Hours)     ** No results found for the last 24 hours. **      XR Foot 3+ View Left (03/09/2023 11:28)  I personally reviewed the radiographs of the left foot.  No acute fracture or dislocation.  There is significant hallux valgus with ulnar deviation of the first metatarsal.  Arthritis present within the first MTP joint.  Erosion present suggestive of chronic gout.    Procedures    Assessment / Plan    Assessment/Plan:   Diagnoses and all orders for this visit:    1. 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 Chronic idiopathic gout involving toe of left foot without tophus    3. Hallux valgus (acquired), left foot      6 months of worsening left great toe pain especially with walking.  Has a history of hallux " valgus repair as a 14-year-old.  He has also had significant bouts of gout most recently 3 months ago.  Currently on medication.  He is tender today over the dorsal and medial aspects of his first MTP joint.  His radiographs show degenerative changes there as well as hallux valgus.  Discussed multiple treatment options.  He is on Lasix now would avoid any anti-inflammatory to avoid medication complications.  Advised him to try Voltaren gel over the joint to help with day-to-day pain.  We will also try spacer between his first and second toes.  Orthotics to help offload the first MTP joint are no other option that we can explore in the future.  Periodic corticosteroid injections for pain control into the first MTP joint can also be considered.  Referral for surgical correction would be last resort.  He will try these measures today and monitor his symptoms.  Follow-up as they dictate.    Previous documentation reviewed: 3/27/2023-Wilfred Kim MD-office visit.    Previous laboratory results reviewed: 3/21/2023-creatinine 0.95, EGFR 90.5.    Previous imaging studies reviewed: 3/9/2023-radiographs of the left foot.    Follow Up:   Return if symptoms worsen or fail to improve.      Lan Dixon MD  [facility]

## 2023-05-01 RX ORDER — POTASSIUM CHLORIDE 1500 MG/1
TABLET, EXTENDED RELEASE ORAL
Qty: 90 TABLET | Refills: 1 | Status: SHIPPED | OUTPATIENT
Start: 2023-05-01

## 2023-05-09 ENCOUNTER — OFFICE VISIT (OUTPATIENT)
Dept: GASTROENTEROLOGY | Facility: CLINIC | Age: 63
End: 2023-05-09
Payer: COMMERCIAL

## 2023-05-09 VITALS
BODY MASS INDEX: 35.3 KG/M2 | DIASTOLIC BLOOD PRESSURE: 78 MMHG | OXYGEN SATURATION: 96 % | WEIGHT: 246.6 LBS | HEIGHT: 70 IN | TEMPERATURE: 97.5 F | HEART RATE: 95 BPM | SYSTOLIC BLOOD PRESSURE: 136 MMHG

## 2023-05-09 DIAGNOSIS — K21.9 GASTROESOPHAGEAL REFLUX DISEASE, UNSPECIFIED WHETHER ESOPHAGITIS PRESENT: ICD-10-CM

## 2023-05-09 DIAGNOSIS — Z78.9 ALCOHOL USE: ICD-10-CM

## 2023-05-09 DIAGNOSIS — K76.0 FATTY LIVER: Primary | ICD-10-CM

## 2023-05-09 NOTE — PROGRESS NOTES
Follow Up      Patient Name: Bala Staley III  : 1960   MRN: [id number]     Chief Complaint:    Chief Complaint   Patient presents with   • Follow-up       History of Present Illness: Bala Staley III is a 62 y.o. male who is here today for follow up on fatty liver.    Since last visit, pt states that his father passed away and he has resumed drinking EtOH, usually 1-2 beverages per day.     GERD continues to be well controlled with esomperazole 40mg daily.     Labs since last visit reveal total bili 0.6, AST 29, ALT 43, alk phos 89, PT 13.7, INR 1.06, Hb 17.0, , MCH 34.8. Fibrosure F1/F2 fibrosis, S2 steatosis.    Patient denies associated fever, chills, abdominal pain, nausea, vomiting, diarrhea, constipation, hematemesis, dysphagia, hematochezia, melena, bloating, weight loss or gain, dysuria, jaundice or bruising.    EGD 10/25 with Dr. Harry. 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 TTS dilator used to dilate to 19mm. Mild antral gastritis. Bx reveals mild reactive gastropathy, no H Pylori, intestinal metaplasia or dysplasia.     Subjective      Review of Systems:   Review of Systems   Constitutional: Negative for appetite change, chills, diaphoresis, fatigue, fever, unexpected weight gain and unexpected weight loss.   HENT: Negative for drooling, facial swelling, mouth sores, nosebleeds, rhinorrhea, sore throat, swollen glands, tinnitus and trouble swallowing.    Eyes: Negative.    Respiratory: Negative for choking, chest tightness and shortness of breath.    Gastrointestinal: Negative for abdominal pain, anal bleeding, blood in stool, constipation, diarrhea, nausea, vomiting, GERD and indigestion.   Endocrine: Negative.    Genitourinary: Negative for dysuria, flank pain and hematuria.   Musculoskeletal: Negative for arthralgias, back pain, myalgias and neck pain.   Skin: Negative for color change, dry skin, pallor and bruise.   Neurological:  Negative for dizziness, tremors, syncope, weakness and numbness.   Psychiatric/Behavioral: Negative for decreased concentration, hallucinations and self-injury. The patient is not nervous/anxious.    All other systems reviewed and are negative.      Medications:     Current Outpatient Medications:   •  allopurinol (Zyloprim) 100 MG tablet, Take 1 tablet by mouth Daily., Disp: 30 tablet, Rfl: 5  •  aspirin 81 MG EC tablet, Take 1 tablet by mouth Daily., Disp: 30 tablet, Rfl: 11  •  atorvastatin (Lipitor) 40 MG tablet, Take 1 tablet by mouth Daily., Disp: 30 tablet, Rfl: 5  •  colchicine 0.6 MG tablet, TAKE 1 TABLET BY MOUTH DAILY, Disp: 30 tablet, Rfl: 1  •  esomeprazole (NexIUM) 40 MG packet, Take 1 capsule by mouth Every Night., Disp: , Rfl:   •  ferrous sulfate 325 (65 FE) MG EC tablet, Take 1 tablet by mouth 2 (Two) Times a Day. (Patient taking differently: Take 1 tablet by mouth Daily With Breakfast.), Disp: 180 tablet, Rfl: 1  •  furosemide (LASIX) 20 MG tablet, TAKE 1 TABLET BY MOUTH TWICE A DAY, Disp: 30 tablet, Rfl: 1  •  KLOR-CON 20 MEQ CR tablet, TAKE 1 TABLET BY MOUTH THREE TIMES A DAY, Disp: 90 tablet, Rfl: 1  •  levocetirizine (XYZAL) 5 MG tablet, Take 1 tablet by mouth Every Evening., Disp: , Rfl:   •  losartan (COZAAR) 25 MG tablet, Take 1 tablet by mouth Daily., Disp: 30 tablet, Rfl: 5  •  MAGNESIUM OXIDE PO, Take 250 mg by mouth Daily., Disp: , Rfl:   •  metoclopramide (REGLAN) 10 MG tablet, Take 1 tablet by mouth 2 (Two) Times a Day., Disp: 60 tablet, Rfl: 5  •  naproxen (Naprosyn) 500 MG tablet, Take 1 tablet by mouth 2 (Two) Times a Day With Meals., Disp: 14 tablet, Rfl: 0  •  neomycin-polymyxin-dexamethasone (MAXITROL) 3.5-96305-3.1 ophthalmic suspension, Administer 1 drop to both eyes 2 (Two) Times a Day., Disp: , Rfl:   •  RA VITAMIN B-12 TR 1000 MCG tablet controlled-release, take 1 tablet by mouth once daily, Disp: 90 each, Rfl: 3  •  TURMERIC PO, Take 1 tablet by mouth Daily., Disp: , Rfl:    •  vitamin D (ERGOCALCIFEROL) 1.25 MG (15405 UT) capsule capsule, Take 1 capsule by mouth Every 30 (Thirty) Days., Disp: 4 capsule, Rfl: 5    Allergies:   Allergies   Allergen Reactions   • Sulfa Antibiotics Unknown (See Comments)     Pt unaware of this allergy   • Contrast Dye (Echo Or Unknown Ct/Mr) Rash       Social History:   Social History     Socioeconomic History   • Marital status:    Tobacco Use   • Smoking status: Never   • Smokeless tobacco: Former     Types: Chew     Quit date: 2002   Vaping Use   • Vaping Use: Never used   Substance and Sexual Activity   • Alcohol use: Yes     Alcohol/week: 2.0 standard drinks     Types: 2 Shots of liquor per week     Comment: per day, [place]    • Drug use: No   • Sexual activity: Yes     Partners: Female     Birth control/protection: None        Surgical History:   Past Surgical History:   Procedure Laterality Date   • APPENDECTOMY     • CARDIAC CATHETERIZATION  2011   • COLONOSCOPY     • ELBOW ARTHROPLASTY      Right    • ELBOW PROCEDURE  2003   • ENDOSCOPY  2011   • ENDOSCOPY  2008    with biopsy   • ESOPHAGEAL MOTILITY STUDY N/A 06/27/2019    Procedure: MANOMETRY;  Surgeon: Mark Lowery MD;  Location: [facility] ENDOSCOPY;  Service: Gastroenterology   • FOOT SURGERY      left foot    • HAND SURGERY  2022   • UPPER GASTROINTESTINAL ENDOSCOPY     • WRIST SURGERY  2022        Medical History:   Past Medical History:   Diagnosis Date   • Anemia     iron deficiency   • Arthritis of back 2003   • Chronic low back pain 03/14/2018   • Colonic polyp    • ED (erectile dysfunction)    • Esophagitis    • Essential hypertension 03/14/2018   • Gastroesophageal reflux disease without esophagitis 03/14/2018   • Hernia of abdominal cavity    • Hip arthrosis 2003   • Hyperlipidemia    • Low back strain     off and on   • Periarthritis of shoulder 2020   • Reactive depression 03/14/2018   • Rotator cuff syndrome 2020   • Tennis elbow    • Vitamin D deficiency 03/14/2018         Objective     Physical Exam:  Vital Signs: There were no vitals filed for this visit.  There is no height or weight on file to calculate BMI.     Physical Exam  Vitals and nursing note reviewed.   Constitutional:       General: He is not in acute distress.     Appearance: Normal appearance. He is not ill-appearing or diaphoretic.   HENT:      Head: Normocephalic and atraumatic.      Right Ear: External ear normal.      Left Ear: External ear normal.      Nose: Nose normal.      Mouth/Throat:      Mouth: Mucous membranes are moist.      Pharynx: Oropharynx is clear.   Eyes:      Conjunctiva/sclera: Conjunctivae normal.      Pupils: Pupils are equal, round, and reactive to light.   Cardiovascular:      Rate and Rhythm: Normal rate and regular rhythm.      Pulses: Normal pulses.      Heart sounds: Normal heart sounds.   Pulmonary:      Effort: Pulmonary effort is normal.      Breath sounds: Normal breath sounds.   Abdominal:      General: Abdomen is flat. There is no distension.      Tenderness: There is no abdominal tenderness. There is no guarding or rebound.   Musculoskeletal:         General: Normal range of motion.      Cervical back: Normal range of motion. No rigidity.      Right lower leg: Edema present.      Left lower leg: Edema present.   Skin:     General: Skin is warm and dry.      Capillary Refill: Capillary refill takes less than 2 seconds.      Coloration: Skin is not jaundiced or pale.      Comments: Telangectasias noted to bilateral cheeks and chest   Neurological:      Mental Status: He is alert and oriented to person, place, and time.   Psychiatric:         Mood and Affect: Mood normal.         Assessment / Plan      Assessment/Plan:   There are no diagnoses linked to this encounter.     Fatty liver  GERD  EtOH use   - continue esomeprazole 40mg daily   - pt given GERD diet instructions, advised to avoid GI irritants such as caffeine, carbonation, EtOH, tobacco, chocolate, peppermint, acid-based  foods   - obtain CBC, CMP, PT/INR   - obtain liver US   - follow up in clinic in 6mo, or after completion of above studies   - call clinic at any time for questions or new / worsened sx    Follow Up:   Return in about 6 months (around 11/9/2023).    Plan of care reviewed with the patient at the conclusion of today's visit.  Education was provided regarding diagnosis, management, and any prescribed or recommended OTC medications.  Patient verbalized understanding of and agreement with management plan.     NOTE TO PATIENT: The 21st Century Cures Act makes medical notes like these available to patients in the interest of transparency. However, be advised this is a medical document. It is intended as peer to peer communication. It is written in medical language and may contain abbreviations or verbiage that are unfamiliar. It may appear blunt or direct. Medical documents are intended to carry relevant information, facts as evident, and the clinical opinion of the practitioner.     Time Statement:   Discussed plan of care in detail with patient today. Patient verbally understands and agrees. I have spent 30 minutes reviewing available diagnostics, obtaining history, examining the patient, developing a treatment plan, and educating the patient on disease process and plan of care.     Alba Gomes PA-C   [facility] [place] [facility]

## 2023-05-10 RX ORDER — FUROSEMIDE 20 MG/1
TABLET ORAL
Qty: 30 TABLET | Refills: 1 | Status: SHIPPED | OUTPATIENT
Start: 2023-05-10

## 2023-05-23 DIAGNOSIS — M10.9 ACUTE GOUT INVOLVING TOE OF LEFT FOOT, UNSPECIFIED CAUSE: ICD-10-CM

## 2023-05-23 RX ORDER — COLCHICINE 0.6 MG/1
TABLET ORAL
Qty: 30 TABLET | Refills: 1 | Status: SHIPPED | OUTPATIENT
Start: 2023-05-23

## 2023-05-31 ENCOUNTER — LAB (OUTPATIENT)
Dept: LAB | Facility: HOSPITAL | Age: 63
End: 2023-05-31

## 2023-05-31 DIAGNOSIS — M10.9 GOUT, UNSPECIFIED CAUSE, UNSPECIFIED CHRONICITY, UNSPECIFIED SITE: Primary | ICD-10-CM

## 2023-05-31 DIAGNOSIS — E78.2 MIXED HYPERLIPIDEMIA: ICD-10-CM

## 2023-05-31 DIAGNOSIS — E55.9 VITAMIN D DEFICIENCY: ICD-10-CM

## 2023-05-31 DIAGNOSIS — K76.0 FATTY LIVER: ICD-10-CM

## 2023-05-31 DIAGNOSIS — K21.9 GASTROESOPHAGEAL REFLUX DISEASE, UNSPECIFIED WHETHER ESOPHAGITIS PRESENT: ICD-10-CM

## 2023-05-31 DIAGNOSIS — Z12.5 PROSTATE CANCER SCREENING: ICD-10-CM

## 2023-05-31 LAB
25(OH)D3 SERPL-MCNC: 45.3 NG/ML (ref 30–100)
ALBUMIN SERPL-MCNC: 4.3 G/DL (ref 3.5–5.2)
ALBUMIN/GLOB SERPL: 1.7 G/DL
ALP SERPL-CCNC: 82 U/L (ref 39–117)
ALT SERPL W P-5'-P-CCNC: 120 U/L (ref 1–41)
ANION GAP SERPL CALCULATED.3IONS-SCNC: 18.6 MMOL/L (ref 5–15)
AST SERPL-CCNC: 85 U/L (ref 1–40)
BASOPHILS # BLD AUTO: 0.04 10*3/MM3 (ref 0–0.2)
BASOPHILS NFR BLD AUTO: 1.1 % (ref 0–1.5)
BILIRUB SERPL-MCNC: 0.6 MG/DL (ref 0–1.2)
BUN SERPL-MCNC: 11 MG/DL (ref 8–23)
BUN/CREAT SERPL: 12.8 (ref 7–25)
CALCIUM SPEC-SCNC: 9.5 MG/DL (ref 8.6–10.5)
CHLORIDE SERPL-SCNC: 103 MMOL/L (ref 98–107)
CHOLEST SERPL-MCNC: 184 MG/DL (ref 0–200)
CO2 SERPL-SCNC: 19.4 MMOL/L (ref 22–29)
CREAT SERPL-MCNC: 0.86 MG/DL (ref 0.76–1.27)
DEPRECATED RDW RBC AUTO: 48.2 FL (ref 37–54)
EGFRCR SERPLBLD CKD-EPI 2021: 97.3 ML/MIN/1.73
EOSINOPHIL # BLD AUTO: 0.23 10*3/MM3 (ref 0–0.4)
EOSINOPHIL NFR BLD AUTO: 6.6 % (ref 0.3–6.2)
ERYTHROCYTE [DISTWIDTH] IN BLOOD BY AUTOMATED COUNT: 13.3 % (ref 12.3–15.4)
GLOBULIN UR ELPH-MCNC: 2.5 GM/DL
GLUCOSE SERPL-MCNC: 79 MG/DL (ref 65–99)
HBA1C MFR BLD: 5.2 % (ref 4.8–5.6)
HCT VFR BLD AUTO: 51.9 % (ref 37.5–51)
HDLC SERPL-MCNC: 92 MG/DL (ref 40–60)
HGB BLD-MCNC: 18.6 G/DL (ref 13–17.7)
IMM GRANULOCYTES # BLD AUTO: 0.01 10*3/MM3 (ref 0–0.05)
IMM GRANULOCYTES NFR BLD AUTO: 0.3 % (ref 0–0.5)
INR PPP: 0.98 (ref 0.89–1.12)
LDLC SERPL CALC-MCNC: 82 MG/DL (ref 0–100)
LDLC/HDLC SERPL: 0.89 {RATIO}
LYMPHOCYTES # BLD AUTO: 0.94 10*3/MM3 (ref 0.7–3.1)
LYMPHOCYTES NFR BLD AUTO: 26.8 % (ref 19.6–45.3)
MAGNESIUM SERPL-MCNC: 2.2 MG/DL (ref 1.6–2.4)
MCH RBC QN AUTO: 35 PG (ref 26.6–33)
MCHC RBC AUTO-ENTMCNC: 35.8 G/DL (ref 31.5–35.7)
MCV RBC AUTO: 97.7 FL (ref 79–97)
MONOCYTES # BLD AUTO: 0.44 10*3/MM3 (ref 0.1–0.9)
MONOCYTES NFR BLD AUTO: 12.5 % (ref 5–12)
NEUTROPHILS NFR BLD AUTO: 1.85 10*3/MM3 (ref 1.7–7)
NEUTROPHILS NFR BLD AUTO: 52.7 % (ref 42.7–76)
NRBC BLD AUTO-RTO: 0 /100 WBC (ref 0–0.2)
PHOSPHATE SERPL-MCNC: 3.4 MG/DL (ref 2.5–4.5)
PLATELET # BLD AUTO: 163 10*3/MM3 (ref 140–450)
PMV BLD AUTO: 10.3 FL (ref 6–12)
POTASSIUM SERPL-SCNC: 3.6 MMOL/L (ref 3.5–5.2)
PROT SERPL-MCNC: 6.8 G/DL (ref 6–8.5)
PROTHROMBIN TIME: 13.1 SECONDS (ref 12.2–14.5)
PSA SERPL-MCNC: 2.82 NG/ML (ref 0–4)
RBC # BLD AUTO: 5.31 10*6/MM3 (ref 4.14–5.8)
SODIUM SERPL-SCNC: 141 MMOL/L (ref 136–145)
TRIGL SERPL-MCNC: 50 MG/DL (ref 0–150)
VLDLC SERPL-MCNC: 10 MG/DL (ref 5–40)
WBC NRBC COR # BLD: 3.51 10*3/MM3 (ref 3.4–10.8)

## 2023-05-31 PROCEDURE — 84100 ASSAY OF PHOSPHORUS: CPT

## 2023-05-31 PROCEDURE — 80061 LIPID PANEL: CPT

## 2023-05-31 PROCEDURE — G0103 PSA SCREENING: HCPCS

## 2023-05-31 PROCEDURE — 83735 ASSAY OF MAGNESIUM: CPT

## 2023-05-31 PROCEDURE — 85610 PROTHROMBIN TIME: CPT | Performed by: PHYSICIAN ASSISTANT

## 2023-05-31 PROCEDURE — 80053 COMPREHEN METABOLIC PANEL: CPT | Performed by: PHYSICIAN ASSISTANT

## 2023-05-31 PROCEDURE — 82172 ASSAY OF APOLIPOPROTEIN: CPT

## 2023-05-31 PROCEDURE — 82306 VITAMIN D 25 HYDROXY: CPT

## 2023-05-31 PROCEDURE — 85025 COMPLETE CBC W/AUTO DIFF WBC: CPT

## 2023-05-31 PROCEDURE — 83036 HEMOGLOBIN GLYCOSYLATED A1C: CPT

## 2023-06-02 LAB — APO B SERPL-MCNC: 74 MG/DL

## 2023-06-05 ENCOUNTER — HOSPITAL ENCOUNTER (OUTPATIENT)
Dept: ULTRASOUND IMAGING | Facility: HOSPITAL | Age: 63
Discharge: HOME | End: 2023-06-05
Admitting: PHYSICIAN ASSISTANT
Payer: COMMERCIAL

## 2023-06-05 DIAGNOSIS — K21.9 GASTROESOPHAGEAL REFLUX DISEASE, UNSPECIFIED WHETHER ESOPHAGITIS PRESENT: ICD-10-CM

## 2023-06-05 DIAGNOSIS — K76.0 FATTY LIVER: ICD-10-CM

## 2023-06-05 PROCEDURE — 76705 ECHO EXAM OF ABDOMEN: CPT

## 2023-06-19 RX ORDER — FUROSEMIDE 20 MG/1
TABLET ORAL
Qty: 30 TABLET | Refills: 1 | Status: SHIPPED | OUTPATIENT
Start: 2023-06-19

## 2023-07-28 RX ORDER — METOCLOPRAMIDE 10 MG/1
TABLET ORAL
Qty: 60 TABLET | Refills: 5 | Status: SHIPPED | OUTPATIENT
Start: 2023-07-28

## 2023-07-28 NOTE — TELEPHONE ENCOUNTER
Rx Refill Note  Requested Prescriptions     Pending Prescriptions Disp Refills    metoclopramide (REGLAN) 10 MG tablet [Pharmacy Med Name: METOCLOPRAMIDE 10 MG TABLET] 60 tablet 5     Sig: TAKE 1 TABLET BY MOUTH TWICE A DAY      Last office visit with prescribing clinician: 7/19/2023   Last telemedicine visit with prescribing clinician: Visit date not found   Next office visit with prescribing clinician: 10/4/2023                         Would you like a call back once the refill request has been completed: [] Yes [] No    If the office needs to give you a call back, can they leave a voicemail: [] Yes [] No    Alba Lazaro LPN  07/28/23, 08:44 EDT

## 2023-08-01 ENCOUNTER — TELEPHONE (OUTPATIENT)
Dept: INTERNAL MEDICINE | Facility: CLINIC | Age: 63
End: 2023-08-01
Payer: COMMERCIAL

## 2023-08-01 DIAGNOSIS — Q24.9 CONGENITAL HEART DISEASE: Primary | ICD-10-CM

## 2023-08-02 ENCOUNTER — LAB (OUTPATIENT)
Dept: LAB | Facility: HOSPITAL | Age: 63
End: 2023-08-02
Payer: COMMERCIAL

## 2023-08-02 DIAGNOSIS — M10.9 GOUT, UNSPECIFIED CAUSE, UNSPECIFIED CHRONICITY, UNSPECIFIED SITE: Primary | ICD-10-CM

## 2023-08-02 LAB
ANION GAP SERPL CALCULATED.3IONS-SCNC: 17 MMOL/L (ref 5–15)
BUN SERPL-MCNC: 9 MG/DL (ref 8–23)
BUN/CREAT SERPL: 10.1 (ref 7–25)
CALCIUM SPEC-SCNC: 9.9 MG/DL (ref 8.6–10.5)
CHLORIDE SERPL-SCNC: 97 MMOL/L (ref 98–107)
CO2 SERPL-SCNC: 23 MMOL/L (ref 22–29)
CREAT SERPL-MCNC: 0.89 MG/DL (ref 0.76–1.27)
EGFRCR SERPLBLD CKD-EPI 2021: 96.3 ML/MIN/1.73
GLUCOSE SERPL-MCNC: 82 MG/DL (ref 65–99)
POTASSIUM SERPL-SCNC: 3.8 MMOL/L (ref 3.5–5.2)
SODIUM SERPL-SCNC: 137 MMOL/L (ref 136–145)

## 2023-08-02 PROCEDURE — 36415 COLL VENOUS BLD VENIPUNCTURE: CPT

## 2023-08-02 PROCEDURE — 80048 BASIC METABOLIC PNL TOTAL CA: CPT

## 2023-08-21 RX ORDER — ATORVASTATIN CALCIUM 40 MG/1
TABLET, FILM COATED ORAL
Qty: 90 TABLET | Refills: 1 | Status: SHIPPED | OUTPATIENT
Start: 2023-08-21

## 2023-08-30 ENCOUNTER — OFFICE VISIT (OUTPATIENT)
Dept: INTERNAL MEDICINE | Facility: CLINIC | Age: 63
End: 2023-08-30
Payer: COMMERCIAL

## 2023-08-30 ENCOUNTER — TELEPHONE (OUTPATIENT)
Dept: INTERNAL MEDICINE | Facility: CLINIC | Age: 63
End: 2023-08-30

## 2023-08-30 ENCOUNTER — LAB (OUTPATIENT)
Dept: LAB | Facility: HOSPITAL | Age: 63
End: 2023-08-30
Payer: COMMERCIAL

## 2023-08-30 VITALS
HEIGHT: 70 IN | TEMPERATURE: 96.4 F | WEIGHT: 236.2 LBS | HEART RATE: 96 BPM | SYSTOLIC BLOOD PRESSURE: 114 MMHG | BODY MASS INDEX: 33.82 KG/M2 | DIASTOLIC BLOOD PRESSURE: 62 MMHG

## 2023-08-30 DIAGNOSIS — M79.10 MYALGIA: ICD-10-CM

## 2023-08-30 DIAGNOSIS — D50.0 IRON DEFICIENCY ANEMIA DUE TO CHRONIC BLOOD LOSS: ICD-10-CM

## 2023-08-30 DIAGNOSIS — R41.0 DISORIENTATION: ICD-10-CM

## 2023-08-30 DIAGNOSIS — F41.9 ANXIETY: ICD-10-CM

## 2023-08-30 DIAGNOSIS — R25.1 TREMOR: ICD-10-CM

## 2023-08-30 DIAGNOSIS — R47.1 EXTRAPYRAMIDAL DYSARTHRIA: ICD-10-CM

## 2023-08-30 DIAGNOSIS — I10 ESSENTIAL HYPERTENSION: Primary | ICD-10-CM

## 2023-08-30 DIAGNOSIS — I10 ESSENTIAL HYPERTENSION: ICD-10-CM

## 2023-08-30 DIAGNOSIS — G25.9 EXTRAPYRAMIDAL DYSARTHRIA: ICD-10-CM

## 2023-08-30 LAB
ALBUMIN SERPL-MCNC: 4.8 G/DL (ref 3.5–5.2)
ALBUMIN/GLOB SERPL: 1.9 G/DL
ALP SERPL-CCNC: 110 U/L (ref 39–117)
ALT SERPL W P-5'-P-CCNC: 112 U/L (ref 1–41)
ANION GAP SERPL CALCULATED.3IONS-SCNC: 13 MMOL/L (ref 5–15)
AST SERPL-CCNC: 101 U/L (ref 1–40)
BASOPHILS # BLD AUTO: 0.05 10*3/MM3 (ref 0–0.2)
BASOPHILS NFR BLD AUTO: 1 % (ref 0–1.5)
BILIRUB SERPL-MCNC: 0.7 MG/DL (ref 0–1.2)
BUN SERPL-MCNC: 9 MG/DL (ref 8–23)
BUN/CREAT SERPL: 10.2 (ref 7–25)
CALCIUM SPEC-SCNC: 9.8 MG/DL (ref 8.6–10.5)
CHLORIDE SERPL-SCNC: 99 MMOL/L (ref 98–107)
CK SERPL-CCNC: 190 U/L (ref 20–200)
CO2 SERPL-SCNC: 25 MMOL/L (ref 22–29)
CREAT SERPL-MCNC: 0.88 MG/DL (ref 0.76–1.27)
CRP SERPL-MCNC: 0.31 MG/DL (ref 0–0.5)
DEPRECATED RDW RBC AUTO: 48.3 FL (ref 37–54)
EGFRCR SERPLBLD CKD-EPI 2021: 96.6 ML/MIN/1.73
EOSINOPHIL # BLD AUTO: 0.13 10*3/MM3 (ref 0–0.4)
EOSINOPHIL NFR BLD AUTO: 2.5 % (ref 0.3–6.2)
ERYTHROCYTE [DISTWIDTH] IN BLOOD BY AUTOMATED COUNT: 13.1 % (ref 12.3–15.4)
GLOBULIN UR ELPH-MCNC: 2.5 GM/DL
GLUCOSE SERPL-MCNC: 93 MG/DL (ref 65–99)
HCT VFR BLD AUTO: 44.8 % (ref 37.5–51)
HGB BLD-MCNC: 15.9 G/DL (ref 13–17.7)
IMM GRANULOCYTES # BLD AUTO: 0.01 10*3/MM3 (ref 0–0.05)
IMM GRANULOCYTES NFR BLD AUTO: 0.2 % (ref 0–0.5)
IRON 24H UR-MRATE: 97 MCG/DL (ref 59–158)
IRON SATN MFR SERPL: 36 % (ref 20–50)
LYMPHOCYTES # BLD AUTO: 0.88 10*3/MM3 (ref 0.7–3.1)
LYMPHOCYTES NFR BLD AUTO: 17.2 % (ref 19.6–45.3)
MCH RBC QN AUTO: 35.7 PG (ref 26.6–33)
MCHC RBC AUTO-ENTMCNC: 35.5 G/DL (ref 31.5–35.7)
MCV RBC AUTO: 100.7 FL (ref 79–97)
MONOCYTES # BLD AUTO: 0.83 10*3/MM3 (ref 0.1–0.9)
MONOCYTES NFR BLD AUTO: 16.2 % (ref 5–12)
NEUTROPHILS NFR BLD AUTO: 3.21 10*3/MM3 (ref 1.7–7)
NEUTROPHILS NFR BLD AUTO: 62.9 % (ref 42.7–76)
NRBC BLD AUTO-RTO: 0 /100 WBC (ref 0–0.2)
PLATELET # BLD AUTO: 176 10*3/MM3 (ref 140–450)
PMV BLD AUTO: 11.1 FL (ref 6–12)
POTASSIUM SERPL-SCNC: 4.3 MMOL/L (ref 3.5–5.2)
PROT SERPL-MCNC: 7.3 G/DL (ref 6–8.5)
RBC # BLD AUTO: 4.45 10*6/MM3 (ref 4.14–5.8)
SODIUM SERPL-SCNC: 137 MMOL/L (ref 136–145)
TIBC SERPL-MCNC: 273 MCG/DL (ref 298–536)
TRANSFERRIN SERPL-MCNC: 183 MG/DL (ref 200–360)
WBC NRBC COR # BLD: 5.11 10*3/MM3 (ref 3.4–10.8)

## 2023-08-30 PROCEDURE — 85652 RBC SED RATE AUTOMATED: CPT

## 2023-08-30 PROCEDURE — 99214 OFFICE O/P EST MOD 30 MIN: CPT | Performed by: INTERNAL MEDICINE

## 2023-08-30 PROCEDURE — 85025 COMPLETE CBC W/AUTO DIFF WBC: CPT

## 2023-08-30 PROCEDURE — 36415 COLL VENOUS BLD VENIPUNCTURE: CPT

## 2023-08-30 PROCEDURE — 86140 C-REACTIVE PROTEIN: CPT

## 2023-08-30 PROCEDURE — 84466 ASSAY OF TRANSFERRIN: CPT

## 2023-08-30 PROCEDURE — 82550 ASSAY OF CK (CPK): CPT

## 2023-08-30 PROCEDURE — 80053 COMPREHEN METABOLIC PANEL: CPT

## 2023-08-30 PROCEDURE — 83540 ASSAY OF IRON: CPT

## 2023-08-30 RX ORDER — ALPRAZOLAM 0.5 MG/1
TABLET ORAL
Qty: 6 TABLET | Refills: 0 | Status: SHIPPED | OUTPATIENT
Start: 2023-08-30

## 2023-08-30 RX ORDER — POTASSIUM CHLORIDE 20 MEQ/1
20 TABLET, EXTENDED RELEASE ORAL DAILY
Qty: 90 TABLET | Refills: 1 | Status: SHIPPED | OUTPATIENT
Start: 2023-08-30

## 2023-08-30 NOTE — PROGRESS NOTES
[facility]     Bala BOONE [facility] III  1960   [id number]      Patient Care Team:  Wilfred Kim MD as PCP - General (Internal Medicine)    Chief Complaint::   Chief Complaint   Patient presents with    jittery    heat intolerence    Anorexia     Loss of appetite    Shaking     hands    mouth twitching    Numbness     fingers        HPI  The patient comes in complaining of tremor, numbness in his hands, heat intolerance, loss of appetite, and feeling jittery. He is accompanied by an adult female.    Back pain  The patient was last seen approximately 1 month ago for back pain. He states that his back pain has resolved. He was unable to get into physical therapy due to bends.    Tremor  The adult female states that the patient is shaking all the time. She states that she has the same things going on, but worse than she did when he was low on iron and potassium. She states that he can hardly put his contacts in. She states that it comes and goes. She states that when he is eating, he is shaking everywhere. She states that he has shaky muscles in his legs. She states that he is spacing out. She states that he has never had so many things going on. She states that his mouth twitches all the time. She states that he stopped the metoclopramide approximately 1 month ago.    Right hand numbness  The patient states that his fingertips are not completely numb. He states that he had surgery on his right hand. He states that they took a bone out and the grinding stopped. He states that this has been long after the surgery. He states that he has no strength. He states that his balance is really off. He states that steps are hard for him.    Anxiety  The patient states that he is claustrophobic. He states that he has never taken a sleeping pill or sedative.    Hypertension  The patient states that his blood pressure is a little low for him. He states that he is taking spironolactone once a day. He states  that he is taking furosemide every day.      Chronic Conditions:      Patient Active Problem List   Diagnosis    Essential hypertension    Mixed hyperlipidemia    Class 2 obesity due to excess calories without serious comorbidity in adult    Reactive depression    Gastroesophageal reflux disease without esophagitis    Vitamin D deficiency    Chronic low back pain    Dyspnea on exertion    Syncope, tussive    Hypokalemia    Cough syncope    Allergic rhinitis    Cough    Hypercholesterolemia    Iron deficiency anemia    Vasovagal syncope    Hernia of abdominal cavity    Right sided sciatica    Hepatic steatosis    Acute gout involving toe of left foot    Idiopathic chronic gout of multiple sites without tophus        Past Medical History:   Diagnosis Date    Anemia     iron deficiency    Arthritis of back 2003    Chronic low back pain 03/14/2018    Colonic polyp     ED (erectile dysfunction)     Esophagitis     Essential hypertension 03/14/2018    Gastroesophageal reflux disease without esophagitis 03/14/2018    Hernia of abdominal cavity     Hip arthrosis 2003    Hyperlipidemia     Low back strain     off and on    Periarthritis of shoulder 2020    Reactive depression 03/14/2018    Rotator cuff syndrome 2020    Tennis elbow     Vitamin D deficiency 03/14/2018       Past Surgical History:   Procedure Laterality Date    APPENDECTOMY      CARDIAC CATHETERIZATION  2011    COLONOSCOPY      ELBOW ARTHROPLASTY      Right     ELBOW PROCEDURE  2003    ENDOSCOPY  2011    ENDOSCOPY  2008    with biopsy    ESOPHAGEAL MOTILITY STUDY N/A 06/27/2019    Procedure: MANOMETRY;  Surgeon: Mark Lowery MD;  Location: [facility] ENDOSCOPY;  Service: Gastroenterology    FOOT SURGERY      left foot     HAND SURGERY  2022    UPPER GASTROINTESTINAL ENDOSCOPY      WRIST SURGERY  2022       Family History   Problem Relation Age of Onset    Alzheimer's disease Mother     Colon polyps Father     Heart disease Father     Heart attack Father      Colon cancer Father     Coronary artery disease Father     No Known Problems Sister     Cancer Paternal Grandmother     Lung cancer Paternal Grandmother     Esophageal cancer Neg Hx        Social History     Socioeconomic History    Marital status:    Tobacco Use    Smoking status: Never    Smokeless tobacco: Former     Types: Chew     Quit date: 2002   Vaping Use    Vaping Use: Never used   Substance and Sexual Activity    Alcohol use: Yes     Alcohol/week: 2.0 standard drinks     Types: 2 Shots of liquor per week     Comment: per day, [place]     Drug use: No    Sexual activity: Yes     Partners: Female     Birth control/protection: None       Allergies   Allergen Reactions    Sulfa Antibiotics Unknown (See Comments)     Pt unaware of this allergy    Contrast Dye (Echo Or Unknown Ct/Mr) Rash         Current Outpatient Medications:     allopurinol (ZYLOPRIM) 100 MG tablet, TAKE 1 TABLET BY MOUTH EVERY DAY, Disp: 90 tablet, Rfl: 1    aspirin 81 MG EC tablet, Take 1 tablet by mouth Daily., Disp: 30 tablet, Rfl: 11    atorvastatin (LIPITOR) 40 MG tablet, TAKE 1 TABLET BY MOUTH EVERY DAY, Disp: 90 tablet, Rfl: 1    colchicine 0.6 MG tablet, TAKE 1 TABLET BY MOUTH EVERY DAY, Disp: 30 tablet, Rfl: 1    esomeprazole (NexIUM) 40 MG packet, Take 1 capsule by mouth Every Night., Disp: , Rfl:     ferrous sulfate 325 (65 FE) MG EC tablet, Take 1 tablet by mouth Daily With Breakfast., Disp: 90 tablet, Rfl: 1    furosemide (LASIX) 20 MG tablet, TAKE 1 TABLET BY MOUTH TWICE A DAY (Patient taking differently: Take 1 tablet by mouth Daily.), Disp: 180 tablet, Rfl: 1    levocetirizine (XYZAL) 5 MG tablet, Take 1 tablet by mouth Every Evening., Disp: , Rfl:     losartan (COZAAR) 25 MG tablet, TAKE 1 TABLET BY MOUTH EVERY DAY, Disp: 30 tablet, Rfl: 5    MAGNESIUM OXIDE PO, Take 250 mg by mouth Daily., Disp: , Rfl:     RA VITAMIN B-12 TR 1000 MCG tablet controlled-release, take 1 tablet by mouth once daily, Disp: 90 each, Rfl:  "3    spironolactone (ALDACTONE) 25 MG tablet, Take 1 tablet by mouth Daily., Disp: , Rfl:     ALPRAZolam (XANAX) 0.5 MG tablet, One hour prior to procedure, Disp: 6 tablet, Rfl: 0    potassium chloride (KLOR-CON) 20 MEQ CR tablet, Take 1 tablet by mouth Daily., Disp: 90 tablet, Rfl: 1    Review of Systems   Constitutional: Negative.    Respiratory: Negative.  Negative for chest tightness and shortness of breath.    Cardiovascular: Negative.  Negative for chest pain.   Gastrointestinal:  Negative for abdominal pain, blood in stool, constipation and diarrhea.      Vital Signs  Vitals:    08/30/23 1438   BP: 114/62   BP Location: Left arm   Patient Position: Sitting   Cuff Size: Large Adult   Pulse: 96   Temp: 96.4 øF (35.8 øC)   Weight: 107 kg (236 lb 3.2 oz)   Height: 179 cm (70.47\")   PainSc: 0-No pain       Physical Exam  Constitutional:       General: He is not in acute distress.  Neck:      Vascular: No carotid bruit.   Cardiovascular:      Rate and Rhythm: Normal rate and regular rhythm.      Heart sounds: No murmur heard.  Pulmonary:      Effort: Pulmonary effort is normal.      Breath sounds: Normal breath sounds.   Neurological:      Mental Status: He is alert and oriented to person, place, and time.      Cranial Nerves: No cranial nerve deficit.      Motor: Tremor present.      Gait: Tandem walk abnormal.        He is obese. He has an intention tremor. He is unable to perform tandem gait.    Procedures    ACE III MINI             Assessment/Plan:    Diagnoses and all orders for this visit:    1. Essential hypertension (Primary)  -     Comprehensive Metabolic Panel; Future    2. Iron deficiency anemia due to chronic blood loss  -     CBC & Differential; Future  -     Iron Profile; Future    3. Myalgia  -     Sedimentation Rate; Future  -     C-reactive Protein; Future  -     CK; Future    4. Extrapyramidal dysarthria  -     MRI Brain With & Without Contrast; Future    5. Tremor  -     MRI Brain With & Without " Contrast; Future    6. Disorientation  -     MRI Brain With & Without Contrast; Future    7. Anxiety  -     ALPRAZolam (XANAX) 0.5 MG tablet; One hour prior to procedure  Dispense: 6 tablet; Refill: 0        1. Symptom complex of abnormal movements or extrapyramidal movements of mouth, tremor, disorientation, and findings of ataxia on exam suggest possible Parkinson's disease or other underlying movement disorder. He had similar symptoms once before when potassium and iron were low. He will have blood work done. We will go ahead and order MRI of the brain and he will likely need neurologic evaluation.    2. Hypertension  Blood pressure is slightly low, most likely due to fluid losses from excessive sweating. He controls edema with furosemide and spironolactone. If his blood pressure is under 110 systolic, he will hold his losartan.    3. Anxiety  He is severely claustrophobic. He is given alprazolam to take before the MRI and I have suggested that he take it at home a day or two before to see how it affects him before he takes it for the procedure.    Plan of care reviewed with patient at the conclusion of today's visit. Education was provided regarding diagnosis, management, and any prescribed or recommended OTC medications.Patient verbalizes understanding of and agreement with management plan.         Wilfred Kim MD         Transcribed from ambient dictation for Wilfred Kim MD by Ella Maurice.  08/30/23   15:53 EDT    Patient or patient representative verbalized consent to the visit recording.  I have personally performed the services described in this document as transcribed by the above individual, and it is both accurate and complete.

## 2023-08-31 LAB — ERYTHROCYTE [SEDIMENTATION RATE] IN BLOOD: 7 MM/HR (ref 0–20)

## 2023-09-01 ENCOUNTER — TELEPHONE (OUTPATIENT)
Dept: INTERNAL MEDICINE | Facility: CLINIC | Age: 63
End: 2023-09-01
Payer: COMMERCIAL

## 2023-09-01 NOTE — TELEPHONE ENCOUNTER
Fax from [employer] ([phone]) need clarification on alprazolam dose. Per Dr. Kim 1-2 tabs 1 hour prior to procedure. Called to [facility]

## 2023-09-05 ENCOUNTER — HOSPITAL ENCOUNTER (OUTPATIENT)
Dept: MRI IMAGING | Facility: HOSPITAL | Age: 63
Discharge: HOME | End: 2023-09-05
Admitting: INTERNAL MEDICINE
Payer: COMMERCIAL

## 2023-09-05 DIAGNOSIS — R41.0 DISORIENTATION: ICD-10-CM

## 2023-09-05 DIAGNOSIS — R25.1 TREMOR: ICD-10-CM

## 2023-09-05 DIAGNOSIS — R47.1 EXTRAPYRAMIDAL DYSARTHRIA: ICD-10-CM

## 2023-09-05 DIAGNOSIS — G25.9 EXTRAPYRAMIDAL DYSARTHRIA: ICD-10-CM

## 2023-09-05 PROCEDURE — 70553 MRI BRAIN STEM W/O & W/DYE: CPT

## 2023-09-05 PROCEDURE — 0 GADOBENATE DIMEGLUMINE 529 MG/ML SOLUTION: Performed by: INTERNAL MEDICINE

## 2023-09-05 PROCEDURE — A9577 INJ MULTIHANCE: HCPCS | Performed by: INTERNAL MEDICINE

## 2023-09-05 RX ADMIN — GADOBENATE DIMEGLUMINE 20 ML: 529 INJECTION, SOLUTION INTRAVENOUS at 19:19

## 2023-09-06 DIAGNOSIS — G25.9 MOVEMENT DISORDER: Primary | ICD-10-CM

## 2023-09-08 ENCOUNTER — TELEPHONE (OUTPATIENT)
Dept: INTERNAL MEDICINE | Facility: CLINIC | Age: 63
End: 2023-09-08
Payer: COMMERCIAL

## 2023-09-08 DIAGNOSIS — Z82.49 FAMILY HISTORY OF VALVULAR HEART DISEASE: Primary | ICD-10-CM

## 2023-09-18 NOTE — PROGRESS NOTES
Neuro Office Visit      Encounter Date: 2023   Patient Name: Bala Staley III  : 1960   MRN: [id number]   PCP:  Wilfred Kim MD     Chief Complaint:    Chief Complaint   Patient presents with   • Tremors       History of Present Illness: Bala Staley III is a 63 y.o. male who is here today in Neurology for tremor.    Tremor:  Onset of intermittent tremor approximately 6 months ago.    Tremor worse on the right than the left.    No dropping items.    Does have issues with food shaking off the fork at times.    No difficulty with glasses or cups.    Able to shave without tremor.    History of vivid dreams and occasionally acting out dreams.    No hallucinations.    No difficulty rolling over in bed.    Able to stand from a seated position without using arms to push him up.    Drinks approximately 2 bourbon beverages nightly.    Weakness:  Generalized weakness that has worsened in the last year or so.    He was previously a runner but has had to stop secondary to fatigue.  He has also stopped playing golf as much due to fatigue.    Most days he just goes home and will lie around on the couch until its time for bed.    Gait imbalance:  Feels like he has been unsteady for quite some period of time.    Much worse when he is hot.    No falls    Dyspnea on exertion:  With any significant activity he will develop shortness of breath.    He is able to walk a few feet before developing dyspnea.    His son recently underwent surgery due to a sending thoracic aneurysm and the patient is scheduled for an echocardiogram at the end of October.      MRI of the brain reviewed by me and reviewed with patient and spouse.    MRI Brain With & Without Contrast (2023 19:29)   Subjective      Past Medical History:   Past Medical History:   Diagnosis Date   • Anemia     iron deficiency   • Arthritis of back    • Chronic low back pain 2018   • Colonic polyp    • Difficulty walking      ballence issue   • Difficulty walking    • Dizziness    • ED (erectile dysfunction)    • Environmental allergies    • Esophagitis    • Essential hypertension 03/14/2018   • Gastroesophageal reflux disease without esophagitis 03/14/2018   • Gout    • Hernia of abdominal cavity    • Hip arthrosis 2003   • HL (hearing loss) 2010    left ear   • Hyperlipidemia    • Hypertension    • Low back strain     off and on   • Periarthritis of shoulder 2020   • Reactive depression 03/14/2018   • Rotator cuff syndrome 2020   • Tennis elbow    • Vitamin D deficiency 03/14/2018   • Weakness        Past Surgical History:   Past Surgical History:   Procedure Laterality Date   • APPENDECTOMY     • CARDIAC CATHETERIZATION  2011   • COLONOSCOPY     • ELBOW ARTHROPLASTY      Right    • ELBOW PROCEDURE  2003   • ENDOSCOPY  2011   • ENDOSCOPY  2008    with biopsy   • ESOPHAGEAL MOTILITY STUDY N/A 06/27/2019    Procedure: MANOMETRY;  Surgeon: Mark Lowery MD;  Location: [facility] ENDOSCOPY;  Service: Gastroenterology   • FOOT SURGERY      left foot    • HAND SURGERY  2022   • UPPER GASTROINTESTINAL ENDOSCOPY     • WRIST SURGERY  2022       Family History:   Family History   Problem Relation Age of Onset   • Alzheimer's disease Mother    • Dementia Mother    • Hypertension Mother    • High cholesterol Mother    • Colon polyps Father    • Heart disease Father    • Heart attack Father    • Colon cancer Father    • Coronary artery disease Father    • No Known Problems Sister    • Cancer Paternal Grandmother    • Lung cancer Paternal Grandmother    • Esophageal cancer Neg Hx        Social History:   Social History     Socioeconomic History   • Marital status:    Tobacco Use   • Smoking status: Never   • Smokeless tobacco: Former     Types: Chew     Quit date: 2002   Vaping Use   • Vaping Use: Never used   Substance and Sexual Activity   • Alcohol use: Yes     Alcohol/week: 2.0 standard drinks     Types: 2 Shots of liquor per week      Comment: per day, [place]    • Drug use: No   • Sexual activity: Yes     Partners: Female     Birth control/protection: None       Medications:     Current Outpatient Medications:   •  allopurinol (ZYLOPRIM) 100 MG tablet, TAKE 1 TABLET BY MOUTH EVERY DAY, Disp: 90 tablet, Rfl: 1  •  aspirin 81 MG EC tablet, Take 1 tablet by mouth Daily., Disp: 30 tablet, Rfl: 11  •  atorvastatin (LIPITOR) 40 MG tablet, TAKE 1 TABLET BY MOUTH EVERY DAY, Disp: 90 tablet, Rfl: 1  •  cetirizine (zyrTEC) 5 MG tablet, Take 1 tablet by mouth Daily., Disp: , Rfl:   •  colchicine 0.6 MG tablet, TAKE 1 TABLET BY MOUTH EVERY DAY, Disp: 30 tablet, Rfl: 1  •  esomeprazole (NexIUM) 40 MG packet, Take 1 capsule by mouth Every Night., Disp: , Rfl:   •  ferrous sulfate 325 (65 FE) MG EC tablet, Take 1 tablet by mouth Daily With Breakfast., Disp: 90 tablet, Rfl: 1  •  furosemide (LASIX) 20 MG tablet, TAKE 1 TABLET BY MOUTH TWICE A DAY (Patient taking differently: Take 1 tablet by mouth Daily.), Disp: 180 tablet, Rfl: 1  •  losartan (COZAAR) 25 MG tablet, TAKE 1 TABLET BY MOUTH EVERY DAY, Disp: 30 tablet, Rfl: 5  •  MAGNESIUM OXIDE PO, Take 250 mg by mouth Daily., Disp: , Rfl:   •  potassium chloride (KLOR-CON) 20 MEQ CR tablet, Take 1 tablet by mouth Daily., Disp: 90 tablet, Rfl: 1  •  spironolactone (ALDACTONE) 25 MG tablet, Take 1 tablet by mouth Daily., Disp: , Rfl:   •  vitamin D (ERGOCALCIFEROL) 1.25 MG (82359 UT) capsule capsule, Take 1 capsule by mouth Every 30 (Thirty) Days., Disp: , Rfl:   •  amitriptyline (ELAVIL) 10 MG tablet, Take 1 tablet by mouth Every Night., Disp: 30 tablet, Rfl: 11    Allergies:   Allergies   Allergen Reactions   • Sulfa Antibiotics Unknown (See Comments)     Pt unaware of this allergy   • Contrast Dye (Echo Or Unknown Ct/Mr) Rash       PHQ-9 Total Score:     STEADI Fall Risk Assessment has not been completed.    Objective     Physical Exam:   Physical Exam  Vitals reviewed.   Eyes:      Extraocular Movements: EOM  normal.      Pupils: Pupils are equal, round, and reactive to light.   Neurological:      Mental Status: He is oriented to person, place, and time.      Coordination: Romberg Test abnormal. Finger-Nose-Finger Test and Heel to Shin Test normal.      Gait: Tandem walk abnormal.      Deep Tendon Reflexes:      Reflex Scores:       Tricep reflexes are 2+ on the right side and 2+ on the left side.       Bicep reflexes are 2+ on the right side and 2+ on the left side.       Brachioradialis reflexes are 2+ on the right side and 2+ on the left side.       Patellar reflexes are 2+ on the right side and 2+ on the left side.       Achilles reflexes are 2+ on the right side and 2+ on the left side.  Psychiatric:         Speech: Speech normal.       Neurologic Exam     Mental Status   Oriented to person, place, and time.   Attention: normal. Concentration: normal.   Speech: speech is normal   Level of consciousness: alert  Knowledge: good.     Cranial Nerves     CN II   Visual fields full to confrontation.     CN III, IV, VI   Pupils are equal, round, and reactive to light.  Extraocular motions are normal.   CN III: no CN III palsy  CN VI: no CN VI palsy    CN V   Facial sensation intact.     CN VII   Facial expression full, symmetric.     CN VIII   CN VIII normal.     CN IX, X   CN IX normal.   CN X normal.     CN XI   CN XI normal.     CN XII   CN XII normal.     Motor Exam     Strength   Right neck flexion: 5/5  Left neck flexion: 5/5  Right neck extension: 5/5  Left neck extension: 5/5  Right deltoid: 5/5  Left deltoid: 5/5  Right biceps: 5/5  Left biceps: 5/5  Right triceps: 5/5  Left triceps: 5/5  Right wrist flexion: 5/5  Left wrist flexion: 5/5  Right wrist extension: 5/5  Left wrist extension: 5/5  Right interossei: 5/5  Left interossei: 5/5  Right abdominals: 5/5  Left abdominals: 5/5  Right iliopsoas: 5/5  Left iliopsoas: 5/5  Right quadriceps: 5/5  Left quadriceps: 5/5  Right hamstrin/5  Left hamstring:  "5/5  Right glutei: 5/5  Left glutei: 5/5  Right anterior tibial: 5/5  Left anterior tibial: 5/5  Right posterior tibial: 5/5  Left posterior tibial: 5/5  Right peroneal: 5/5  Left peroneal: 5/5  Right gastroc: 5/5  Left gastroc: 5/5    Sensory Exam   Light touch normal.     Gait, Coordination, and Reflexes     Coordination   Romberg: positive  Finger to nose coordination: normal  Heel to shin coordination: normal  Tandem walking coordination: abnormal    Tremor   Resting tremor: present  Intention tremor: absent  Action tremor: absent    Reflexes   Right brachioradialis: 2+  Left brachioradialis: 2+  Right biceps: 2+  Left biceps: 2+  Right triceps: 2+  Left triceps: 2+  Right patellar: 2+  Left patellar: 2+  Right achilles: 2+  Left achilles: 2+  Right : 2+  Left : 2+     Vital Signs:   Vitals:    09/19/23 1046   BP: 132/78   Pulse: 96   SpO2: 96%   Weight: 106 kg (234 lb 6.4 oz)   Height: 179 cm (70.47\")     Body mass index is 33.19 kg/m².     Results:   Imaging:   MRI Brain With & Without Contrast    Result Date: 9/5/2023  No intracranial hemorrhage. No acute infarction. Involutional and ischemic gliotic changes. Fluid in the right mastoid air cells. Electronically Signed: Lavelle Vora MD  9/5/2023 7:39 PM EDT  Workstation ID: [id number]     Liver    Result Date: 6/6/2023  Impression: Diffusely increased echogenicity of the liver parenchyma suggesting steatosis. Electronically Signed: Carlton Hope  6/6/2023 12:50 PM EDT  Workstation ID: [id number]       Labs:    No results found for: CMP, PROTEIN, ANTIMOGAB, ENHJUL7IDAC, JCVRESULT, QUANTTBGOLD, CBCDIF, IGGALBSER     Assessment / Plan      Assessment/Plan:   Diagnoses and all orders for this visit:    1. Dyspnea on exertion (Primary)  -     Ambulatory Referral to [facility] - [facility]    2. Balance problem  -     Ambulatory Referral to Physical Therapy Evaluate and treat    3. Tremor  -     NM Brain DaTScan; Future    Other orders  -     " amitriptyline (ELAVIL) 10 MG tablet; Take 1 tablet by mouth Every Night.  Dispense: 30 tablet; Refill: 11           Patient Education:   Mr. Staley has no family history of Parkinson's disease or essential tremor.  He is concerned that this could potentially be related to Parkinson's and is interested in a DaTscan.    We will try Elavil nightly to see if that will help control the tremor.  If not we will try propranolol.    I am concerned given his son's history of thoracic aneurysm that Mr. Staley needs to have a thorough evaluation of dyspnea on exertion.  I have sent a referral to [facility] for a work-up.    Reviewed medications, potential side effects and signs and symptoms to report. Discussed risk versus benefits of treatment plan with patient and/or family-including medications, labs and radiology that may be ordered. Addressed questions and concerns during visit. Patient and/or family verbalized understanding and agree with plan. Instructed to call the office with any questions and report to ER with any life-threatening symptoms.     Follow Up:   Return in about 3 months (around 12/19/2023).    I spent 30  minutes face to face with the patient and family. I personally spent 50 percent of this time counseling and discussing diagnosis, diagnostic testing, driving, treatment options, and management .       During this visit the following were done:  Labs Reviewed []    Labs Ordered []    Radiology Reports Reviewed []    Radiology Ordered [x]    PCP Records Reviewed []    Referring Provider Records Reviewed [x]    ER Records Reviewed []    Hospital Records Reviewed []    History Obtained From Family []    Radiology Images Reviewed [x]    Other Reviewed []    Records Requested []      SY Garcia  [facility]  [address] [place] [address]  [facility] [id number] [phone]

## 2023-09-19 ENCOUNTER — OFFICE VISIT (OUTPATIENT)
Dept: NEUROLOGY | Facility: CLINIC | Age: 63
End: 2023-09-19
Payer: COMMERCIAL

## 2023-09-19 VITALS
HEIGHT: 70 IN | BODY MASS INDEX: 33.56 KG/M2 | DIASTOLIC BLOOD PRESSURE: 78 MMHG | HEART RATE: 96 BPM | OXYGEN SATURATION: 96 % | SYSTOLIC BLOOD PRESSURE: 132 MMHG | WEIGHT: 234.4 LBS

## 2023-09-19 DIAGNOSIS — R25.1 TREMOR: ICD-10-CM

## 2023-09-19 DIAGNOSIS — R06.09 DYSPNEA ON EXERTION: Primary | ICD-10-CM

## 2023-09-19 DIAGNOSIS — R26.89 BALANCE PROBLEM: ICD-10-CM

## 2023-09-19 PROCEDURE — 99214 OFFICE O/P EST MOD 30 MIN: CPT | Performed by: NURSE PRACTITIONER

## 2023-09-19 RX ORDER — ERGOCALCIFEROL 1.25 MG/1
50000 CAPSULE ORAL
COMMUNITY
Start: 2023-09-06

## 2023-09-19 RX ORDER — AMITRIPTYLINE HYDROCHLORIDE 10 MG/1
10 TABLET, FILM COATED ORAL NIGHTLY
Qty: 30 TABLET | Refills: 11 | Status: SHIPPED | OUTPATIENT
Start: 2023-09-19 | End: 2024-09-18

## 2023-09-19 RX ORDER — CETIRIZINE HYDROCHLORIDE 5 MG/1
5 TABLET ORAL DAILY
COMMUNITY

## 2023-09-21 DIAGNOSIS — M10.9 ACUTE GOUT INVOLVING TOE OF LEFT FOOT, UNSPECIFIED CAUSE: ICD-10-CM

## 2023-09-21 RX ORDER — COLCHICINE 0.6 MG/1
TABLET ORAL
Qty: 30 TABLET | Refills: 1 | Status: SHIPPED | OUTPATIENT
Start: 2023-09-21

## 2023-10-02 ENCOUNTER — OFFICE VISIT (OUTPATIENT)
Dept: CARDIOLOGY | Facility: HOSPITAL | Age: 63
End: 2023-10-02
Payer: COMMERCIAL

## 2023-10-02 ENCOUNTER — HOSPITAL ENCOUNTER (OUTPATIENT)
Dept: CARDIOLOGY | Facility: HOSPITAL | Age: 63
Discharge: HOME | End: 2023-10-02
Payer: COMMERCIAL

## 2023-10-02 VITALS
RESPIRATION RATE: 20 BRPM | OXYGEN SATURATION: 95 % | DIASTOLIC BLOOD PRESSURE: 70 MMHG | WEIGHT: 230.6 LBS | HEIGHT: 70 IN | TEMPERATURE: 97.8 F | SYSTOLIC BLOOD PRESSURE: 117 MMHG | BODY MASS INDEX: 33.01 KG/M2 | HEART RATE: 104 BPM

## 2023-10-02 DIAGNOSIS — R06.09 DYSPNEA ON EXERTION: ICD-10-CM

## 2023-10-02 DIAGNOSIS — R00.2 PALPITATIONS: ICD-10-CM

## 2023-10-02 DIAGNOSIS — R53.83 OTHER FATIGUE: ICD-10-CM

## 2023-10-02 DIAGNOSIS — R06.09 DYSPNEA ON EXERTION: Primary | ICD-10-CM

## 2023-10-02 DIAGNOSIS — I49.1 PREMATURE ATRIAL COMPLEXES: ICD-10-CM

## 2023-10-02 LAB
QT INTERVAL: 336 MS
QTC INTERVAL: 428 MS

## 2023-10-02 PROCEDURE — 93005 ELECTROCARDIOGRAM TRACING: CPT | Performed by: NURSE PRACTITIONER

## 2023-10-02 PROCEDURE — 93242 EXT ECG>48HR<7D RECORDING: CPT

## 2023-10-02 PROCEDURE — 99214 OFFICE O/P EST MOD 30 MIN: CPT | Performed by: NURSE PRACTITIONER

## 2023-10-02 NOTE — PROGRESS NOTES
[facility] Heart Monitor Documentation    Bala BOONE Gess III  1960  [id number]  10/02/23      [] ZIO XT Patch  Model [id number] Prescribed for  Days    Serial Number: (N + 9 Digits) N   Apply-By Date on Box:   USPS Tracking Number:   USPS Tracking        [] [employer] MINI PLUS Mobile Cardiac Telemetry  Model BGMINIPLUS Prescribed for  Days    Serial Number: (BGM + 7 Digits) BGM  Shipped-By Date on Box:   UPS Tracking Number: [id number]  UPS Tracking      [x] [employer] MINI Holter Monitor  Model BGMINIEL Prescribed for 14 Days    Serial Number: (7 Digits) [id number]  Shipped-By Date on Box: 09/26/2023  UPS Tracking Number: [id number]  UPS Tracking        This monitor was applied to the patient's chest and checked for proper functioning.  Mr. Bala BOONE Gess III was instructed in the proper use of this monitor.  He was given the opportunity to ask questions and left the office with the device 's instruction manual.    Anahi Connors, [facility], 14:16 EDT, 10/02/23                  [facility]

## 2023-10-02 NOTE — PROGRESS NOTES
"Chief Complaint  Shortness of Breath    Subjective      History of Present Illness {CC  Problem List  Visit  Diagnosis   Encounters  Notes  Medications  Labs  Result Review Imaging  Media :23}     Bala Staley III, 63 y.o. male with past medical history significant for anemia, arthritis, hypertension, GERD, gout, hyperlipidemia, and tremor, who presents to [facility] for Shortness of Breath.  The patient was evaluated by neurology on 9/19/2023 at which time patient endorsed dyspnea on exertion.  Patient stated at that time that minimal activity caused him to become acutely short of air.  Patient was referred to heart and valve Center for evaluation of dyspnea on exertion.    Patient states shortness of air has been ongoing for months, which has been worse with minimal acitivity. He endorses some chest \"tightness\", but overall denies chest pain. He also endorses palpitations which occurs daily. He endorses fatigue, altered balance, increased sweating, near syncope, and heat intolerance. Denies any swelling to lower extremities. He is checking blood pressure at home which is running around 117/78, HR running in the 90's.     Caffeine intake of 1 soda daily  Water intake adequate  ETOH two daily drinks    Son with atrial fib and aortic valve replacement  Father with CAD/CABG in his 60's    Echocardiogram 11/13/2018:  Left ventricular systolic function is normal.  Estimated EF appears to be in the range of 56 - 60%.  Left ventricular wall thickness is consistent with mild concentric hypertrophy.    SPECT stress test 4/10/2018:  Exercise cardiolite within normal limits.  Left ventricular ejection fraction is normal (Calculated EF = 68%).  No significant ST or t wave changes noted  Occasional PVC and ventricular couplet noted.      Objective     Vital Signs:   Vitals:    10/02/23 1320 10/02/23 1321 10/02/23 1322   BP: 120/72 118/71 117/70   BP Location: Right arm Left arm Left arm " "  Patient Position: Sitting Sitting Sitting   Cuff Size: Large Adult Large Adult Large Adult   Pulse: 101 114 104   Resp:   20   Temp: 97.8 °F (36.6 °C) 97.8 °F (36.6 °C) 97.8 °F (36.6 °C)   TempSrc: Temporal Temporal Temporal   SpO2: 95% 96% 95%   Weight:   105 kg (230 lb 9.6 oz)   Height:   177.8 cm (70\")     Body mass index is 33.09 kg/m².  Physical Exam  Vitals and nursing note reviewed.   Constitutional:       Appearance: Normal appearance.   HENT:      Head: Normocephalic.   Eyes:      Pupils: Pupils are equal, round, and reactive to light.   Cardiovascular:      Rate and Rhythm: Normal rate. Rhythm irregular.      Pulses: Normal pulses.      Heart sounds: Normal heart sounds. No murmur heard.  Pulmonary:      Effort: Pulmonary effort is normal.      Breath sounds: Normal breath sounds.   Abdominal:      General: Bowel sounds are normal.      Palpations: Abdomen is soft.   Musculoskeletal:         General: Normal range of motion.      Cervical back: Normal range of motion.      Right lower leg: No edema.      Left lower leg: No edema.   Skin:     General: Skin is warm and dry.      Capillary Refill: Capillary refill takes less than 2 seconds.   Neurological:      Mental Status: He is alert and oriented to person, place, and time.   Psychiatric:         Mood and Affect: Mood normal.         Thought Content: Thought content normal.              Data Reviewed:{ Labs  Result Review  Imaging  Med Tab  Media :23}   ECG 12 Lead (09/19/2022 15:41)  ECG 12 Lead (06/25/2021 08:51)  Adult Transthoracic Echo Complete W/ Cont if Necessary Per Protocol (11/13/2018 09:46)    Lab Results   Component Value Date    WBC 5.11 08/30/2023    HGB 15.9 08/30/2023    HCT 44.8 08/30/2023    .7 (H) 08/30/2023     08/30/2023      Lab Results   Component Value Date    GLUCOSE 93 08/30/2023    BUN 9 08/30/2023    CREATININE 0.88 08/30/2023    EGFR 96.6 08/30/2023    BCR 10.2 08/30/2023    K 4.3 08/30/2023    CO2 25.0 " 08/30/2023    CALCIUM 9.8 08/30/2023    ALBUMIN 4.8 08/30/2023    BILITOT 0.7 08/30/2023     (H) 08/30/2023     (H) 08/30/2023      Lab Results   Component Value Date    CKTOTAL 190 08/30/2023    TROPONINI 0.017 11/13/2018      MRI Brain With & Without Contrast (09/05/2023 19:29)     Assessment & Plan   Assessment and Plan {CC Problem List  Visit Diagnosis  ROS  Review (Popup)  Health Maintenance  Quality  BestPractice  Medications  SmartSets  SnapShot Encounters  Media :23}     1. Dyspnea on exertion  -Dyspnea on exertion has been significantly worse recently  -Patient has echocardiogram scheduled for 10/20. Will attempt to get echo done more quickly if able  -EKG today while in office showed normal sinus rhythm, rate 98, with PACs  -Will obtain stress test to evaluate for any ischemic causes which could be contributing to patient's symptoms  - ECG 12 Lead; Future  - Holter Monitor - 72 Hour Up To 15 Days; Future  - Stress Test With Myocardial Perfusion (1 Day); Future  - Adult Transthoracic Echo Complete W/ Cont if Necessary Per Protocol; Future    2. Palpitations  -EKG today while in office showed normal sinus rhythm, rate 98, with PACs  - ECG 12 Lead; Future  - Holter Monitor - 72 Hour Up To 15 Days; Future  - Stress Test With Myocardial Perfusion (1 Day); Future  - Adult Transthoracic Echo Complete W/ Cont if Necessary Per Protocol; Future    3. Premature atrial complexes  -EKG today while in office showed normal sinus rhythm, rate 98, with PACs  - Holter Monitor - 72 Hour Up To 15 Days; Future  - Stress Test With Myocardial Perfusion (1 Day); Future  - Adult Transthoracic Echo Complete W/ Cont if Necessary Per Protocol; Future    4. Other fatigue  -Will obtain echocardiogram and stress test.   -Plan to follow up with patient in 4 weeks to discuss test results          Follow Up {Instructions Charge Capture  Follow-up Communications :23}     Return in about 4 weeks (around  10/30/2023) for Monitor Results, Palpitations, Result review.      Patient was given instructions and counseling regarding his condition or for health maintenance advice. Please see specific information pulled into the AVS if appropriate.  Patient was instructed to call the [facility] with any questions, concerns, or worsening symptoms.    Dictated Utilizing Dragon Dictation   Please note that portions of this note were completed with a voice recognition program.   Part of this note may be an electronic transcription/translation of spoken language to printed text using the Dragon Dictation System.

## 2023-10-03 ENCOUNTER — HOSPITAL ENCOUNTER (OUTPATIENT)
Dept: CARDIOLOGY | Facility: HOSPITAL | Age: 63
Discharge: HOME | End: 2023-10-03
Admitting: NURSE PRACTITIONER
Payer: COMMERCIAL

## 2023-10-03 VITALS — WEIGHT: 230 LBS | HEIGHT: 70 IN | BODY MASS INDEX: 32.93 KG/M2

## 2023-10-03 DIAGNOSIS — I49.1 PREMATURE ATRIAL COMPLEXES: ICD-10-CM

## 2023-10-03 DIAGNOSIS — R00.2 PALPITATIONS: ICD-10-CM

## 2023-10-03 DIAGNOSIS — R06.09 DYSPNEA ON EXERTION: ICD-10-CM

## 2023-10-03 LAB
ASCENDING AORTA: 4.1 CM
BH CV ECHO MEAS - AO MAX PG: 7.2 MMHG
BH CV ECHO MEAS - AO MEAN PG: 4.7 MMHG
BH CV ECHO MEAS - AO ROOT DIAM: 4.1 CM
BH CV ECHO MEAS - AO V2 MAX: 133.8 CM/SEC
BH CV ECHO MEAS - AO V2 VTI: 18.9 CM
BH CV ECHO MEAS - AVA(I,D): 2.6 CM2
BH CV ECHO MEAS - EDV(CUBED): 127.3 ML
BH CV ECHO MEAS - EDV(MOD-SP2): 86.1 ML
BH CV ECHO MEAS - EDV(MOD-SP4): 62.2 ML
BH CV ECHO MEAS - EF(MOD-BP): 50.5 %
BH CV ECHO MEAS - EF(MOD-SP2): 49.7 %
BH CV ECHO MEAS - EF(MOD-SP4): 50.5 %
BH CV ECHO MEAS - ESV(CUBED): 10.8 ML
BH CV ECHO MEAS - ESV(MOD-SP2): 43.3 ML
BH CV ECHO MEAS - ESV(MOD-SP4): 30.8 ML
BH CV ECHO MEAS - FS: 56.1 %
BH CV ECHO MEAS - IVS/LVPW: 0.99 CM
BH CV ECHO MEAS - IVSD: 1.19 CM
BH CV ECHO MEAS - LA DIMENSION: 3.3 CM
BH CV ECHO MEAS - LAT PEAK E' VEL: 10 CM/SEC
BH CV ECHO MEAS - LV DIASTOLIC VOL/BSA (35-75): 28.1 CM2
BH CV ECHO MEAS - LV MASS(C)D: 234.7 GRAMS
BH CV ECHO MEAS - LV MAX PG: 3.7 MMHG
BH CV ECHO MEAS - LV MEAN PG: 2.3 MMHG
BH CV ECHO MEAS - LV SYSTOLIC VOL/BSA (12-30): 13.9 CM2
BH CV ECHO MEAS - LV V1 MAX: 95.5 CM/SEC
BH CV ECHO MEAS - LV V1 VTI: 15.4 CM
BH CV ECHO MEAS - LVIDD: 5 CM
BH CV ECHO MEAS - LVIDS: 2.21 CM
BH CV ECHO MEAS - LVOT AREA: 3.3 CM2
BH CV ECHO MEAS - LVOT DIAM: 2.04 CM
BH CV ECHO MEAS - LVPWD: 1.2 CM
BH CV ECHO MEAS - MED PEAK E' VEL: 5.1 CM/SEC
BH CV ECHO MEAS - MV A MAX VEL: 99.4 CM/SEC
BH CV ECHO MEAS - MV DEC SLOPE: 797.1 CM/SEC2
BH CV ECHO MEAS - MV DEC TIME: 0.13 SEC
BH CV ECHO MEAS - MV E MAX VEL: 69.4 CM/SEC
BH CV ECHO MEAS - MV E/A: 0.7
BH CV ECHO MEAS - MV MAX PG: 6.2 MMHG
BH CV ECHO MEAS - MV MEAN PG: 3 MMHG
BH CV ECHO MEAS - MV P1/2T: 30.9 MSEC
BH CV ECHO MEAS - MV V2 VTI: 18.6 CM
BH CV ECHO MEAS - MVA(P1/2T): 7.1 CM2
BH CV ECHO MEAS - MVA(VTI): 2.7 CM2
BH CV ECHO MEAS - RAP SYSTOLE: 3 MMHG
BH CV ECHO MEAS - RVSP: 13 MMHG
BH CV ECHO MEAS - SI(MOD-SP2): 19.3 ML/M2
BH CV ECHO MEAS - SI(MOD-SP4): 14.2 ML/M2
BH CV ECHO MEAS - SV(LVOT): 50.1 ML
BH CV ECHO MEAS - SV(MOD-SP2): 42.8 ML
BH CV ECHO MEAS - SV(MOD-SP4): 31.4 ML
BH CV ECHO MEAS - TAPSE (>1.6): 2.24 CM
BH CV ECHO MEAS - TR MAX PG: 9.5 MMHG
BH CV ECHO MEAS - TR MAX VEL: 154 CM/SEC
BH CV ECHO MEASUREMENTS AVERAGE E/E' RATIO: 9.19
BH CV XLRA - RV BASE: 3.9 CM
BH CV XLRA - RV LENGTH: 8 CM
BH CV XLRA - RV MID: 2.5 CM
BH CV XLRA - TDI S': 16.3 CM/SEC
LEFT ATRIUM VOLUME INDEX: 14.9 ML/M2

## 2023-10-03 PROCEDURE — 93306 TTE W/DOPPLER COMPLETE: CPT | Performed by: INTERNAL MEDICINE

## 2023-10-03 PROCEDURE — 93306 TTE W/DOPPLER COMPLETE: CPT

## 2023-10-04 ENCOUNTER — TELEPHONE (OUTPATIENT)
Dept: NEUROLOGY | Facility: CLINIC | Age: 63
End: 2023-10-04
Payer: COMMERCIAL

## 2023-10-04 ENCOUNTER — OFFICE VISIT (OUTPATIENT)
Dept: INTERNAL MEDICINE | Facility: CLINIC | Age: 63
End: 2023-10-04
Payer: COMMERCIAL

## 2023-10-04 VITALS
TEMPERATURE: 97.1 F | BODY MASS INDEX: 33.21 KG/M2 | SYSTOLIC BLOOD PRESSURE: 114 MMHG | HEART RATE: 72 BPM | HEIGHT: 70 IN | DIASTOLIC BLOOD PRESSURE: 72 MMHG | WEIGHT: 232 LBS

## 2023-10-04 DIAGNOSIS — R53.83 OTHER FATIGUE: ICD-10-CM

## 2023-10-04 DIAGNOSIS — K76.0 HEPATIC STEATOSIS: ICD-10-CM

## 2023-10-04 DIAGNOSIS — E78.2 MIXED HYPERLIPIDEMIA: ICD-10-CM

## 2023-10-04 DIAGNOSIS — I10 ESSENTIAL HYPERTENSION: Primary | ICD-10-CM

## 2023-10-04 DIAGNOSIS — R30.0 DYSURIA: ICD-10-CM

## 2023-10-04 DIAGNOSIS — E55.9 VITAMIN D DEFICIENCY: ICD-10-CM

## 2023-10-04 PROCEDURE — 99213 OFFICE O/P EST LOW 20 MIN: CPT | Performed by: INTERNAL MEDICINE

## 2023-10-04 NOTE — PROGRESS NOTES
[facility]     Bala BOONE [facility] III  1960   [id number]      Patient Care Team:  Wilfred Kim MD as PCP - General (Internal Medicine)    Chief Complaint::   Chief Complaint   Patient presents with    Hypertension    Hyperlipidemia    Shaking     tremor    cardiac flutter     Wearing a holter monitor    Excessive Sweating        HPI  HPI  The patient comes in for follow-up of hypertension, hyperlipidemia, vitamin D deficiency, and hepatic steatosis, he recently saw neurology for tremor. He was started on amitriptyline and may be worked up further for Parkinson's disease. At the time he had exertional dyspnea and was referred to heart valve clinic. He was to undergo a stress test. An echocardiogram showed slight diastolic hypertrophy, mild left concentric hypertrophy, and borderline dilation of the ascending aorta at 4.1 cm. He is accompanied by an adult female.    Tremors  The adult female states that the patient feels terrible. He denies any pain, but he is still wobbly and shaking.     Cardiovascular  The adult female states that the patient has a heart monitor on. He sweats all the time. He had experienced skipped heart beats during the stress test. He cannot be active.     Blood Sugars  His blood sugar has been checked numerous times and he gets the shakes, and he throws it out. He does not have any evidence of diabetes. All of the blood sugars have been normal.      Health Maintenance   He does not have an appetite. He had a cheeseburger and French fries last night. He had about 3 bites of the hamburger and he was done. His weight fluctuates at 234 pounds, 230 pounds, and 232 pounds. He cannot be active. He has no strength, or balance. He cannot sit at the pool on a nice day and cannot bear the heat. He has a history of falling over and passing out. He has always had low iron or low potassium. He has had a syncope cough for an extended period. His urine has the same odor each time he  urinates, and his kidney function tests are normal.      Chronic Conditions:  Hypertension, hyperlipidemia, vitamin D deficiency, and hepatic steatosis.    Patient Active Problem List   Diagnosis    Essential hypertension    Mixed hyperlipidemia    Class 2 obesity due to excess calories without serious comorbidity in adult    Reactive depression    Gastroesophageal reflux disease without esophagitis    Vitamin D deficiency    Chronic low back pain    Dyspnea on exertion    Syncope, tussive    Hypokalemia    Cough syncope    Allergic rhinitis    Cough    Hypercholesterolemia    Iron deficiency anemia    Vasovagal syncope    Hernia of abdominal cavity    Right sided sciatica    Hepatic steatosis    Acute gout involving toe of left foot    Idiopathic chronic gout of multiple sites without tophus        Past Medical History:   Diagnosis Date    Anemia     iron deficiency    Arthritis of back 2003    Chronic low back pain 03/14/2018    Colonic polyp     Difficulty walking 2022    ballence issue    Difficulty walking     Dizziness     ED (erectile dysfunction)     Environmental allergies     Esophagitis     Essential hypertension 03/14/2018    Gastroesophageal reflux disease without esophagitis 03/14/2018    Gout     Hernia of abdominal cavity     Hip arthrosis 2003    HL (hearing loss) 2010    left ear    Hyperlipidemia     Hypertension     Low back strain     off and on    Periarthritis of shoulder 2020    Reactive depression 03/14/2018    Rotator cuff syndrome 2020    Tennis elbow     Vitamin D deficiency 03/14/2018    Weakness        Past Surgical History:   Procedure Laterality Date    APPENDECTOMY      CARDIAC CATHETERIZATION  2011    COLONOSCOPY      ELBOW ARTHROPLASTY      Right     ELBOW PROCEDURE  2003    ENDOSCOPY  2011    ENDOSCOPY  2008    with biopsy    ESOPHAGEAL MOTILITY STUDY N/A 06/27/2019    Procedure: MANOMETRY;  Surgeon: Mark Lowery MD;  Location: [facility] ENDOSCOPY;  Service: Gastroenterology     FOOT SURGERY      left foot     HAND SURGERY  2022    UPPER GASTROINTESTINAL ENDOSCOPY      WRIST SURGERY  2022       Family History   Problem Relation Age of Onset    Alzheimer's disease Mother     Dementia Mother     Hypertension Mother     High cholesterol Mother     Colon polyps Father     Heart disease Father     Heart attack Father     Colon cancer Father     Coronary artery disease Father     Lung disease Sister     No Known Problems Maternal Grandmother     Alzheimer's disease Maternal Grandfather     Cancer Paternal Grandmother     Lung cancer Paternal Grandmother     Heart disease Paternal Grandfather     Heart disease Son     Esophageal cancer Neg Hx        Social History     Socioeconomic History    Marital status:    Tobacco Use    Smoking status: Never    Smokeless tobacco: Former     Types: Chew     Quit date: 2002   Vaping Use    Vaping Use: Never used   Substance and Sexual Activity    Alcohol use: Yes     Alcohol/week: 14.0 standard drinks     Types: 14 Shots of liquor per week     Comment: per day, [place]     Drug use: No    Sexual activity: Yes     Partners: Female     Birth control/protection: None       Allergies   Allergen Reactions    Sulfa Antibiotics Unknown (See Comments)     Pt unaware of this allergy    Contrast Dye (Echo Or Unknown Ct/Mr) Rash         Current Outpatient Medications:     allopurinol (ZYLOPRIM) 100 MG tablet, TAKE 1 TABLET BY MOUTH EVERY DAY, Disp: 90 tablet, Rfl: 1    amitriptyline (ELAVIL) 10 MG tablet, Take 1 tablet by mouth Every Night., Disp: 30 tablet, Rfl: 11    ASCORBIC ACID PO, Take 1 tablet by mouth Daily., Disp: , Rfl:     aspirin 81 MG EC tablet, Take 1 tablet by mouth Daily., Disp: 30 tablet, Rfl: 11    atorvastatin (LIPITOR) 40 MG tablet, TAKE 1 TABLET BY MOUTH EVERY DAY, Disp: 90 tablet, Rfl: 1    cetirizine (zyrTEC) 5 MG tablet, Take 1 tablet by mouth Daily., Disp: , Rfl:     colchicine 0.6 MG tablet, TAKE 1 TABLET BY MOUTH EVERY DAY, Disp: 30  "tablet, Rfl: 1    esomeprazole (NexIUM) 40 MG packet, Take 1 capsule by mouth Every Night., Disp: , Rfl:     ferrous sulfate 325 (65 FE) MG EC tablet, Take 1 tablet by mouth Daily With Breakfast., Disp: 90 tablet, Rfl: 1    furosemide (LASIX) 20 MG tablet, TAKE 1 TABLET BY MOUTH TWICE A DAY (Patient taking differently: Take 1 tablet by mouth Daily.), Disp: 180 tablet, Rfl: 1    losartan (COZAAR) 25 MG tablet, TAKE 1 TABLET BY MOUTH EVERY DAY, Disp: 30 tablet, Rfl: 5    MAGNESIUM OXIDE PO, Take 250 mg by mouth Daily., Disp: , Rfl:     potassium chloride (KLOR-CON) 20 MEQ CR tablet, Take 1 tablet by mouth Daily., Disp: 90 tablet, Rfl: 1    spironolactone (ALDACTONE) 25 MG tablet, Take 1 tablet by mouth Daily., Disp: , Rfl:     vitamin D (ERGOCALCIFEROL) 1.25 MG (34333 UT) capsule capsule, Take 1 capsule by mouth Every 30 (Thirty) Days., Disp: , Rfl:     Review of Systems   Unremarkable    Vital Signs  Vitals:    10/04/23 1451   BP: 114/72   BP Location: Left arm   Patient Position: Sitting   Cuff Size: Adult   Pulse: 72   Temp: 97.1 °F (36.2 °C)   Weight: 105 kg (232 lb)   Height: 177.8 cm (70\")   PainSc: 0-No pain       Physical Exam  Vitals reviewed.   Constitutional:       Appearance: He is well-developed. He is obese.   HENT:      Head: Normocephalic and atraumatic.   Cardiovascular:      Rate and Rhythm: Normal rate and regular rhythm.      Heart sounds: Normal heart sounds. No murmur heard.  Pulmonary:      Effort: Pulmonary effort is normal.      Breath sounds: Normal breath sounds.   Neurological:      Mental Status: He is alert and oriented to person, place, and time.        He has difficulty climbing onto the examination table. He has a tremor mostly with intention in both hands. He is diaphoretic but in no distress. There is an occasional extra systolic heartbeat. He is obese.    Procedures    ACE III MINI             Assessment/Plan:    Diagnoses and all orders for this visit:    1. Essential hypertension " (Primary)  -     CBC & Differential; Future  -     Homocysteine; Future    2. Mixed hyperlipidemia  -     Comprehensive Metabolic Panel; Future  -     Lipid Panel; Future  -     Homocysteine; Future    3. Vitamin D deficiency  -     Vitamin D,25-Hydroxy; Future    4. Hepatic steatosis    5. Other fatigue  -     Vitamin B12; Future  -     TSH; Future    6. Dysuria  -     Urinalysis With Microscopic - Urine, Clean Catch; Future  -     Cytology, Urine; Future        1. Growing symptoms of tremor, weakness, shortness of breath, diaphoresis, decreased appetite without weight loss, all of which thus far unexplained.  - He had a scan at the [facility] yesterday that apparently ruled out Parkinson's disease.  - Echocardiogram showed findings that are not the cause of his symptoms.  - He has a heart monitor on at present and is scheduled for stress testing.  - If those tests are unhelpful, I think it would be time to consider referral to a referral clinic such as [facility] for further evaluation.  - Follow-up 6 months.        Plan of care reviewed with patient at the conclusion of today's visit. Education was provided regarding diagnosis, management, and any prescribed or recommended OTC medications.Patient verbalizes understanding of and agreement with management plan.         Wilfred Kim MD      Transcribed from ambient dictation for Wilfred Kim MD by Alin Caraballo.  10/04/23   16:20 EDT    Patient or patient representative verbalized consent to the visit recording.  I have personally performed the services described in this document as transcribed by the above individual, and it is both accurate and complete.

## 2023-10-04 NOTE — PROGRESS NOTES
Your echo shows that your heart is squeezing normally.  There are no significant valve abnormalities.  There is a borderline enlargement of the aorta, which will need to be monitored once a year.  Please keep your follow-up with Nabila Adam to discuss

## 2023-10-05 ENCOUNTER — LAB (OUTPATIENT)
Dept: LAB | Facility: HOSPITAL | Age: 63
End: 2023-10-05
Payer: COMMERCIAL

## 2023-10-05 DIAGNOSIS — E55.9 VITAMIN D DEFICIENCY: ICD-10-CM

## 2023-10-05 DIAGNOSIS — R31.9 HEMATURIA, UNSPECIFIED TYPE: ICD-10-CM

## 2023-10-05 DIAGNOSIS — R53.83 OTHER FATIGUE: ICD-10-CM

## 2023-10-05 DIAGNOSIS — E78.2 MIXED HYPERLIPIDEMIA: ICD-10-CM

## 2023-10-05 DIAGNOSIS — R30.0 DYSURIA: ICD-10-CM

## 2023-10-05 DIAGNOSIS — I10 ESSENTIAL HYPERTENSION: ICD-10-CM

## 2023-10-05 LAB
BASOPHILS # BLD AUTO: 0.03 10*3/MM3 (ref 0–0.2)
BASOPHILS NFR BLD AUTO: 1 % (ref 0–1.5)
DEPRECATED RDW RBC AUTO: 49.5 FL (ref 37–54)
EOSINOPHIL # BLD AUTO: 0.17 10*3/MM3 (ref 0–0.4)
EOSINOPHIL NFR BLD AUTO: 5.5 % (ref 0.3–6.2)
ERYTHROCYTE [DISTWIDTH] IN BLOOD BY AUTOMATED COUNT: 12.9 % (ref 12.3–15.4)
HCT VFR BLD AUTO: 48.3 % (ref 37.5–51)
HGB BLD-MCNC: 17.2 G/DL (ref 13–17.7)
IMM GRANULOCYTES # BLD AUTO: 0.01 10*3/MM3 (ref 0–0.05)
IMM GRANULOCYTES NFR BLD AUTO: 0.3 % (ref 0–0.5)
LYMPHOCYTES # BLD AUTO: 0.88 10*3/MM3 (ref 0.7–3.1)
LYMPHOCYTES NFR BLD AUTO: 28.3 % (ref 19.6–45.3)
MCH RBC QN AUTO: 37.2 PG (ref 26.6–33)
MCHC RBC AUTO-ENTMCNC: 35.6 G/DL (ref 31.5–35.7)
MCV RBC AUTO: 104.5 FL (ref 79–97)
MONOCYTES # BLD AUTO: 0.43 10*3/MM3 (ref 0.1–0.9)
MONOCYTES NFR BLD AUTO: 13.8 % (ref 5–12)
NEUTROPHILS NFR BLD AUTO: 1.59 10*3/MM3 (ref 1.7–7)
NEUTROPHILS NFR BLD AUTO: 51.1 % (ref 42.7–76)
NRBC BLD AUTO-RTO: 0 /100 WBC (ref 0–0.2)
PLATELET # BLD AUTO: 157 10*3/MM3 (ref 140–450)
PMV BLD AUTO: 10.9 FL (ref 6–12)
RBC # BLD AUTO: 4.62 10*6/MM3 (ref 4.14–5.8)
WBC NRBC COR # BLD: 3.11 10*3/MM3 (ref 3.4–10.8)

## 2023-10-05 PROCEDURE — 84443 ASSAY THYROID STIM HORMONE: CPT

## 2023-10-05 PROCEDURE — 82607 VITAMIN B-12: CPT

## 2023-10-05 PROCEDURE — 82306 VITAMIN D 25 HYDROXY: CPT

## 2023-10-05 PROCEDURE — 80053 COMPREHEN METABOLIC PANEL: CPT

## 2023-10-05 PROCEDURE — 83090 ASSAY OF HOMOCYSTEINE: CPT

## 2023-10-05 PROCEDURE — 85025 COMPLETE CBC W/AUTO DIFF WBC: CPT

## 2023-10-05 PROCEDURE — 81001 URINALYSIS AUTO W/SCOPE: CPT

## 2023-10-05 PROCEDURE — 80061 LIPID PANEL: CPT

## 2023-10-06 LAB
25(OH)D3 SERPL-MCNC: 49.8 NG/ML (ref 30–100)
ALBUMIN SERPL-MCNC: 4.6 G/DL (ref 3.5–5.2)
ALBUMIN/GLOB SERPL: 1.7 G/DL
ALP SERPL-CCNC: 112 U/L (ref 39–117)
ALT SERPL W P-5'-P-CCNC: 132 U/L (ref 1–41)
ANION GAP SERPL CALCULATED.3IONS-SCNC: 12.5 MMOL/L (ref 5–15)
AST SERPL-CCNC: 141 U/L (ref 1–40)
BACTERIA UR QL AUTO: ABNORMAL /HPF
BILIRUB SERPL-MCNC: 0.8 MG/DL (ref 0–1.2)
BILIRUB UR QL STRIP: NEGATIVE
BUN SERPL-MCNC: 7 MG/DL (ref 8–23)
BUN/CREAT SERPL: 8.3 (ref 7–25)
CALCIUM SPEC-SCNC: 9.7 MG/DL (ref 8.6–10.5)
CHLORIDE SERPL-SCNC: 100 MMOL/L (ref 98–107)
CHOLEST SERPL-MCNC: 171 MG/DL (ref 0–200)
CLARITY UR: CLEAR
CO2 SERPL-SCNC: 25.5 MMOL/L (ref 22–29)
COD CRY URNS QL: ABNORMAL /HPF
COLOR UR: ABNORMAL
CREAT SERPL-MCNC: 0.84 MG/DL (ref 0.76–1.27)
EGFRCR SERPLBLD CKD-EPI 2021: 98 ML/MIN/1.73
GLOBULIN UR ELPH-MCNC: 2.7 GM/DL
GLUCOSE SERPL-MCNC: 86 MG/DL (ref 65–99)
GLUCOSE UR STRIP-MCNC: NEGATIVE MG/DL
HCYS SERPL-MCNC: 97.1 UMOL/L (ref 0–15)
HDLC SERPL-MCNC: 80 MG/DL (ref 40–60)
HGB UR QL STRIP.AUTO: NEGATIVE
HYALINE CASTS UR QL AUTO: ABNORMAL /LPF
KETONES UR QL STRIP: ABNORMAL
LDLC SERPL CALC-MCNC: 75 MG/DL (ref 0–100)
LDLC/HDLC SERPL: 0.92 {RATIO}
LEUKOCYTE ESTERASE UR QL STRIP.AUTO: ABNORMAL
MUCOUS THREADS URNS QL MICRO: ABNORMAL /HPF
NITRITE UR QL STRIP: NEGATIVE
PH UR STRIP.AUTO: 6 [PH] (ref 5–8)
POTASSIUM SERPL-SCNC: 4 MMOL/L (ref 3.5–5.2)
PROT SERPL-MCNC: 7.3 G/DL (ref 6–8.5)
PROT UR QL STRIP: ABNORMAL
RBC # UR STRIP: ABNORMAL /HPF
REF LAB TEST METHOD: ABNORMAL
SODIUM SERPL-SCNC: 138 MMOL/L (ref 136–145)
SP GR UR STRIP: 1.02 (ref 1–1.03)
SQUAMOUS #/AREA URNS HPF: ABNORMAL /HPF
TRIGL SERPL-MCNC: 87 MG/DL (ref 0–150)
TSH SERPL DL<=0.05 MIU/L-ACNC: 1.39 UIU/ML (ref 0.27–4.2)
UROBILINOGEN UR QL STRIP: ABNORMAL
VIT B12 BLD-MCNC: 438 PG/ML (ref 211–946)
VLDLC SERPL-MCNC: 16 MG/DL (ref 5–40)
WBC # UR STRIP: ABNORMAL /HPF

## 2023-10-09 ENCOUNTER — APPOINTMENT (OUTPATIENT)
Dept: GENERAL RADIOLOGY | Facility: HOSPITAL | Age: 63
End: 2023-10-09
Payer: COMMERCIAL

## 2023-10-09 ENCOUNTER — HOSPITAL ENCOUNTER (EMERGENCY)
Facility: HOSPITAL | Age: 63
Discharge: HOME | End: 2023-10-09
Attending: EMERGENCY MEDICINE | Admitting: EMERGENCY MEDICINE
Payer: COMMERCIAL

## 2023-10-09 VITALS
RESPIRATION RATE: 18 BRPM | SYSTOLIC BLOOD PRESSURE: 126 MMHG | HEART RATE: 100 BPM | OXYGEN SATURATION: 96 % | TEMPERATURE: 98.1 F | BODY MASS INDEX: 30.48 KG/M2 | DIASTOLIC BLOOD PRESSURE: 75 MMHG | HEIGHT: 72 IN | WEIGHT: 225 LBS

## 2023-10-09 DIAGNOSIS — I49.1 PREMATURE ATRIAL CONTRACTIONS: ICD-10-CM

## 2023-10-09 DIAGNOSIS — R00.2 PALPITATIONS: Primary | ICD-10-CM

## 2023-10-09 DIAGNOSIS — R06.00 DYSPNEA, UNSPECIFIED TYPE: ICD-10-CM

## 2023-10-09 LAB
ALBUMIN SERPL-MCNC: 4.5 G/DL (ref 3.5–5.2)
ALBUMIN/GLOB SERPL: 1.5 G/DL
ALP SERPL-CCNC: 122 U/L (ref 39–117)
ALT SERPL W P-5'-P-CCNC: 123 U/L (ref 1–41)
ANION GAP SERPL CALCULATED.3IONS-SCNC: 14 MMOL/L (ref 5–15)
AST SERPL-CCNC: 119 U/L (ref 1–40)
BASOPHILS # BLD AUTO: 0.05 10*3/MM3 (ref 0–0.2)
BASOPHILS NFR BLD AUTO: 0.9 % (ref 0–1.5)
BILIRUB SERPL-MCNC: 1 MG/DL (ref 0–1.2)
BUN SERPL-MCNC: 10 MG/DL (ref 8–23)
BUN/CREAT SERPL: 10.9 (ref 7–25)
CALCIUM SPEC-SCNC: 9.9 MG/DL (ref 8.6–10.5)
CHLORIDE SERPL-SCNC: 94 MMOL/L (ref 98–107)
CO2 SERPL-SCNC: 25 MMOL/L (ref 22–29)
CREAT SERPL-MCNC: 0.92 MG/DL (ref 0.76–1.27)
DEPRECATED RDW RBC AUTO: 46.1 FL (ref 37–54)
EGFRCR SERPLBLD CKD-EPI 2021: 93.5 ML/MIN/1.73
EOSINOPHIL # BLD AUTO: 0.14 10*3/MM3 (ref 0–0.4)
EOSINOPHIL NFR BLD AUTO: 2.5 % (ref 0.3–6.2)
ERYTHROCYTE [DISTWIDTH] IN BLOOD BY AUTOMATED COUNT: 11.9 % (ref 12.3–15.4)
GLOBULIN UR ELPH-MCNC: 3 GM/DL
GLUCOSE SERPL-MCNC: 120 MG/DL (ref 65–99)
HCT VFR BLD AUTO: 50.3 % (ref 37.5–51)
HGB BLD-MCNC: 17.6 G/DL (ref 13–17.7)
HOLD SPECIMEN: NORMAL
IMM GRANULOCYTES # BLD AUTO: 0.02 10*3/MM3 (ref 0–0.05)
IMM GRANULOCYTES NFR BLD AUTO: 0.4 % (ref 0–0.5)
LYMPHOCYTES # BLD AUTO: 1.21 10*3/MM3 (ref 0.7–3.1)
LYMPHOCYTES NFR BLD AUTO: 21.5 % (ref 19.6–45.3)
MCH RBC QN AUTO: 36.4 PG (ref 26.6–33)
MCHC RBC AUTO-ENTMCNC: 35 G/DL (ref 31.5–35.7)
MCV RBC AUTO: 103.9 FL (ref 79–97)
MONOCYTES # BLD AUTO: 0.8 10*3/MM3 (ref 0.1–0.9)
MONOCYTES NFR BLD AUTO: 14.2 % (ref 5–12)
NEUTROPHILS NFR BLD AUTO: 3.42 10*3/MM3 (ref 1.7–7)
NEUTROPHILS NFR BLD AUTO: 60.5 % (ref 42.7–76)
NRBC BLD AUTO-RTO: 0 /100 WBC (ref 0–0.2)
NT-PROBNP SERPL-MCNC: <36 PG/ML (ref 0–900)
PLATELET # BLD AUTO: 167 10*3/MM3 (ref 140–450)
PMV BLD AUTO: 10.3 FL (ref 6–12)
POTASSIUM SERPL-SCNC: 3.9 MMOL/L (ref 3.5–5.2)
PROT SERPL-MCNC: 7.5 G/DL (ref 6–8.5)
RBC # BLD AUTO: 4.84 10*6/MM3 (ref 4.14–5.8)
REF LAB TEST METHOD: NORMAL
SODIUM SERPL-SCNC: 133 MMOL/L (ref 136–145)
TROPONIN T SERPL HS-MCNC: 19 NG/L
TROPONIN T SERPL HS-MCNC: 19 NG/L
TSH SERPL DL<=0.05 MIU/L-ACNC: 2.15 UIU/ML (ref 0.27–4.2)
WBC NRBC COR # BLD: 5.64 10*3/MM3 (ref 3.4–10.8)
WHOLE BLOOD HOLD COAG: NORMAL
WHOLE BLOOD HOLD SPECIMEN: NORMAL

## 2023-10-09 PROCEDURE — 83880 ASSAY OF NATRIURETIC PEPTIDE: CPT | Performed by: EMERGENCY MEDICINE

## 2023-10-09 PROCEDURE — 71045 X-RAY EXAM CHEST 1 VIEW: CPT

## 2023-10-09 PROCEDURE — 84443 ASSAY THYROID STIM HORMONE: CPT | Performed by: PHYSICIAN ASSISTANT

## 2023-10-09 PROCEDURE — 84484 ASSAY OF TROPONIN QUANT: CPT | Performed by: EMERGENCY MEDICINE

## 2023-10-09 PROCEDURE — 80053 COMPREHEN METABOLIC PANEL: CPT | Performed by: EMERGENCY MEDICINE

## 2023-10-09 PROCEDURE — 85025 COMPLETE CBC W/AUTO DIFF WBC: CPT | Performed by: EMERGENCY MEDICINE

## 2023-10-09 PROCEDURE — 36415 COLL VENOUS BLD VENIPUNCTURE: CPT

## 2023-10-09 PROCEDURE — 93005 ELECTROCARDIOGRAM TRACING: CPT | Performed by: EMERGENCY MEDICINE

## 2023-10-09 PROCEDURE — 99284 EMERGENCY DEPT VISIT MOD MDM: CPT

## 2023-10-09 RX ORDER — SODIUM CHLORIDE 0.9 % (FLUSH) 0.9 %
10 SYRINGE (ML) INJECTION AS NEEDED
Status: DISCONTINUED | OUTPATIENT
Start: 2023-10-09 | End: 2023-10-09 | Stop reason: HOSPADM

## 2023-10-09 NOTE — Clinical Note
[facility] EMERGENCY DEPARTMENT  [address]  [facility] [address]  Phone: [phone]    Bala Staley was seen and treated in our emergency department on 10/9/2023.  He may return to work on 10/11/2023.         Thank you for choosing [facility].    Hong He MD

## 2023-10-09 NOTE — ED PROVIDER NOTES
Subjective   History of Present Illness  Mr. Staley is a 63 year old male with a history of hypertension, GERD, HLD, and chronic low back pain, who presents via EMS with complaints of shortness of breath and palpitations.  The pt states he has been having brief episodes of palpitations off/on for several weeks and has seen cardiology and had a Holter monitor placed and underwent echocardiogram recently.  Today, he states his SOA and palpitations have been more prolonged and severe, lasting over three hours now.  No associated chest pain.  Some diaphoresis.  No fever or cough.  No orthopnea.  No lower extremity edema or pain.  He denies any known thyroid disease.  He drinks one diet Coke per day.  No coffee.  No appetite suppressants or decongestants.        Review of Systems   Constitutional:  Positive for diaphoresis. Negative for chills and fever.   HENT:  Negative for sore throat.    Respiratory:  Positive for shortness of breath. Negative for cough and chest tightness.    Cardiovascular:  Positive for palpitations. Negative for chest pain and leg swelling.   Gastrointestinal:  Negative for abdominal pain, diarrhea, nausea and vomiting.   Genitourinary:  Negative for dysuria.   Musculoskeletal:  Positive for back pain (chronic low back pain).   Skin:  Negative for pallor.   Allergic/Immunologic: Negative for immunocompromised state.   Neurological:  Negative for dizziness, weakness, light-headedness and headaches.   Hematological: Negative.    Psychiatric/Behavioral: Negative.         Past Medical History:   Diagnosis Date    Anemia     iron deficiency    Arthritis of back 2003    Chronic low back pain 03/14/2018    Colonic polyp     Difficulty walking 2022    ballence issue    Difficulty walking     Dizziness     ED (erectile dysfunction)     Environmental allergies     Esophagitis     Essential hypertension 03/14/2018    Gastroesophageal reflux disease without esophagitis 03/14/2018    Gout     Hernia of  abdominal cavity     Hip arthrosis 2003    HL (hearing loss) 2010    left ear    Hyperlipidemia     Hypertension     Low back strain     off and on    Periarthritis of shoulder 2020    Reactive depression 03/14/2018    Rotator cuff syndrome 2020    Tennis elbow     Vitamin D deficiency 03/14/2018    Weakness        Allergies   Allergen Reactions    Sulfa Antibiotics Unknown (See Comments)     Pt unaware of this allergy    Contrast Dye (Echo Or Unknown Ct/Mr) Rash       Past Surgical History:   Procedure Laterality Date    APPENDECTOMY      CARDIAC CATHETERIZATION  2011    COLONOSCOPY      ELBOW ARTHROPLASTY      Right     ELBOW PROCEDURE  2003    ENDOSCOPY  2011    ENDOSCOPY  2008    with biopsy    ESOPHAGEAL MOTILITY STUDY N/A 06/27/2019    Procedure: MANOMETRY;  Surgeon: Mark Lowery MD;  Location: [facility] ENDOSCOPY;  Service: Gastroenterology    FOOT SURGERY      left foot     HAND SURGERY  2022    UPPER GASTROINTESTINAL ENDOSCOPY      WRIST SURGERY  2022       Family History   Problem Relation Age of Onset    Alzheimer's disease Mother     Dementia Mother     Hypertension Mother     High cholesterol Mother     Colon polyps Father     Heart disease Father     Heart attack Father     Colon cancer Father     Coronary artery disease Father     Lung disease Sister     No Known Problems Maternal Grandmother     Alzheimer's disease Maternal Grandfather     Cancer Paternal Grandmother     Lung cancer Paternal Grandmother     Heart disease Paternal Grandfather     Heart disease Son     Esophageal cancer Neg Hx        Social History     Socioeconomic History    Marital status:    Tobacco Use    Smoking status: Never    Smokeless tobacco: Former     Types: Chew     Quit date: 2002   Vaping Use    Vaping Use: Never used   Substance and Sexual Activity    Alcohol use: Yes     Alcohol/week: 14.0 standard drinks of alcohol     Types: 14 Shots of liquor per week     Comment: per day, [place]     Drug use: No     Sexual activity: Yes     Partners: Female     Birth control/protection: None           Objective   Physical Exam  Constitutional:       General: He is not in acute distress.     Appearance: He is well-developed. He is diaphoretic (trace). He is not ill-appearing.   HENT:      Nose: Nose normal.      Mouth/Throat:      Mouth: Mucous membranes are moist.   Eyes:      General: No scleral icterus.     Conjunctiva/sclera: Conjunctivae normal.      Pupils: Pupils are equal, round, and reactive to light.   Cardiovascular:      Rate and Rhythm: Normal rate. Rhythm irregular.      Pulses: Normal pulses.      Heart sounds: Normal heart sounds.   Pulmonary:      Effort: Pulmonary effort is normal.      Breath sounds: Normal breath sounds.   Abdominal:      Tenderness: There is no abdominal tenderness.   Musculoskeletal:      Cervical back: Normal range of motion and neck supple.      Right lower leg: No edema.      Left lower leg: No edema.   Skin:     General: Skin is warm.      Coloration: Skin is not pale.   Neurological:      Mental Status: He is alert and oriented to person, place, and time.   Psychiatric:         Mood and Affect: Mood normal.         Procedures           ED Course      In summary, 63-year-old male with history of hypertension, hyperlipidemia, GERD and chronic low back pain, presents to the emergency department via EMS with complaints of palpitations and shortness of breath.  The patient states that he has had episodic palpitations for several weeks now.  He has seen cardiology and has a Holter monitor in place.  He also had a recent echocardiogram and was told that it was normal.  He denies any associated chest pain.  No fever or cough.  No orthopnea.  No lower extremity edema or pain.    MDM: Differential includes atrial fibrillation or other arrhythmia, pneumonia, MI, CHF, electrolyte abnormality, etc.    Labs, EKG and chest x-ray obtained.  Old records reviewed.  I saw that he had a prior   EKG on  10/2/2023 that showed sinus rhythm with PACs.   He had an echocardiogram as well with normal LV function and an EF of 51-55% with mild hypertrophy.  There was borderline dilatation of the ascending aorta which will be followed yearly.    Today's labs are fairly bland.  His TSH is normal at 2.1.    Chemistries show mild hyponatremia at 133.    Creatinine is normal at 0.92.    LFTs are chronically elevated and he has a history of fatty liver disease per old records.    HS troponin initial is 19 with a two hour repeat of 19.     Today's initial EKG shows a sinus tach with PACs with rate of 101.      Repeat EKG shows sinus rhythm with PAC's with rate of 83.    CXR:  no active disease.    I spoke with the pt about his workup.  He is concerned about possible A-fib.  I have not seen any a-fib while here but hopefully, his Holter monitor will resolve the question.  I don't see any indication for emergent cardiac consult.  I will d/c home and advise him to call cardiology tomorrow and see if they want to process the Holter early.  I have advised him to avoid all stimulants and to return if worse.                                           Medical Decision Making  Amount and/or Complexity of Data Reviewed  Labs: ordered.  Radiology: ordered.  ECG/medicine tests: ordered.    Risk  Prescription drug management.        Final diagnoses:   Palpitations   Premature atrial contractions   Dyspnea, unspecified type       ED Disposition  ED Disposition       ED Disposition   Discharge    Condition   Stable    Comment   --               Belinda Cain, SY  [address]  [facility] [address]  [phone]          [facility] MEDICAL [facility] CARDIOLOGY  [address]  [facility] [id number] [phone]        [facility] EMERGENCY DEPARTMENT  [address] [id number] [phone]    If symptoms worsen         Medication List        Changed       furosemide 20 MG tablet  Commonly known as: LASIX  TAKE 1 TABLET BY MOUTH TWICE A DAY  What changed: when to take this                 Arnulfo Ortiz PA  10/09/23 2037

## 2023-10-10 LAB
QT INTERVAL: 336 MS
QT INTERVAL: 348 MS
QT INTERVAL: 362 MS
QTC INTERVAL: 427 MS
QTC INTERVAL: 428 MS
QTC INTERVAL: 451 MS

## 2023-10-10 NOTE — DISCHARGE INSTRUCTIONS
Rest.  Avoid stimulants.  Call cardiology tomorrow to discuss your ED visit and see if they would consider processing the Holter monitor early.  Return to the ER if symptoms persist or worsen.

## 2023-10-11 DIAGNOSIS — R31.9 HEMATURIA, UNSPECIFIED TYPE: Primary | ICD-10-CM

## 2023-10-12 ENCOUNTER — TELEPHONE (OUTPATIENT)
Dept: CARDIOLOGY | Facility: HOSPITAL | Age: 63
End: 2023-10-12
Payer: COMMERCIAL

## 2023-10-12 NOTE — TELEPHONE ENCOUNTER
Patient is currently wearing a holter monitor placed on 10/2 and will still be wearing it on his appointment date next week. He called wanting to know if he needs to r/s or send back monitor early.

## 2023-10-13 DIAGNOSIS — R80.1 PERSISTENT PROTEINURIA: Primary | ICD-10-CM

## 2023-10-13 NOTE — TELEPHONE ENCOUNTER
Nabila Adam, SY  You22 hours ago (2:49 PM)     SW  He can go ahead and send his monitor back prior to his 14-day.  Since he had symptoms during monitoring we will use what data we have to assess his condition.     Patient notified that he can discontinue wearing the monitor and send it back. Explained we could use what we have in data since he has been asymptomatic.

## 2023-10-14 ENCOUNTER — CLINICAL SUPPORT (OUTPATIENT)
Dept: INTERNAL MEDICINE | Facility: CLINIC | Age: 63
End: 2023-10-14
Payer: COMMERCIAL

## 2023-10-14 DIAGNOSIS — Z23 NEED FOR INFLUENZA VACCINATION: Primary | ICD-10-CM

## 2023-10-14 PROCEDURE — 90686 IIV4 VACC NO PRSV 0.5 ML IM: CPT | Performed by: INTERNAL MEDICINE

## 2023-10-14 PROCEDURE — 90471 IMMUNIZATION ADMIN: CPT | Performed by: INTERNAL MEDICINE

## 2023-10-15 ENCOUNTER — TELEPHONE (OUTPATIENT)
Dept: INTERNAL MEDICINE | Facility: CLINIC | Age: 63
End: 2023-10-15
Payer: COMMERCIAL

## 2023-10-15 NOTE — TELEPHONE ENCOUNTER
Please call pt and let him know I want him to go off atorvastatin for a few weeks to make sure that's not causing his muscle and joint issues.

## 2023-10-16 ENCOUNTER — TREATMENT (OUTPATIENT)
Dept: PHYSICAL THERAPY | Facility: CLINIC | Age: 63
End: 2023-10-16
Payer: COMMERCIAL

## 2023-10-16 DIAGNOSIS — R26.89 IMPAIRMENT OF BALANCE: Primary | ICD-10-CM

## 2023-10-16 PROCEDURE — 97162 PT EVAL MOD COMPLEX 30 MIN: CPT | Performed by: PHYSICAL THERAPIST

## 2023-10-16 PROCEDURE — 97110 THERAPEUTIC EXERCISES: CPT | Performed by: PHYSICAL THERAPIST

## 2023-10-16 NOTE — PROGRESS NOTES
Physical Therapy Initial Evaluation and Plan of Care     [facility] [place]          [address]     Patient: Bala Staley III   : 1960  Diagnosis/ICD-10 Code:  Impairment of balance [R26.89]  Referring practitioner: NORTH Garcia*  Date of Initial Visit: 10/16/2023  Today's Date: 10/16/2023  Patient seen for 1 sessions    Subjective:     Subjective Questionnaire: GAMBINO 50/56  FGA       Subjective Evaluation    History of Present Illness  Mechanism of injury: Patient reports he gets a flutter in his chest. He recently did a heart monitor for 8 days. He has trouble stepping up/down a step. He does have steps in his home. Closed eyes give him trouble in the shower. Long distance walking is hard. He feels weak and legs have strength issues. He used to play tennis. Heat is bothersome. No numbness or tingling in his feet.       Patient Occupation: desk job Pain  No pain reported           Objective          Strength/Myotome Testing     Left Hip   Planes of Motion   Flexion: 4+  Extension: 4  Abduction: 4+    Right Hip   Planes of Motion   Flexion: 4+  Extension: 4  Abduction: 4+    Left Knee   Flexion: 5  Extension: 5    Right Knee   Flexion: 5  Extension: 5    Left Ankle/Foot   Dorsiflexion: 4+  Plantar flexion: 3  Inversion: 4+  Eversion: 4+    Right Ankle/Foot   Dorsiflexion: 4+  Plantar flexion: 3  Inversion: 4+  Eversion: 4+    Ambulation     Comments   Normalized gait pattern     General Comments     Ankle/Foot Comments   Pt demonstrates bilateral LE discoloration, decreased hair growth, dry/shiny skin, along with distal gastroc atrophy       PT Neuro         Assessment & Plan       Assessment  Impairments: abnormal coordination, abnormal gait, activity intolerance, impaired balance, impaired physical strength and safety issue   Functional limitations: carrying objects, walking, standing and stooping   Assessment details: Patient is a 63 YOM that presents to PT  with worsening balance over the last several months. He explains that he was very active, playing tennis almost 5 days per week and now he is unable to do exercise at all due to BLE and balance. He also is currently being worked up for heart issues due to what he describes as fluttering. Patient's exam reveals slight lower leg weakness, along with discoloration of B feet/ankles, glossy skin, little hear and atrophied distal gastroc. He scored an 8/10 on B monofilament testing, along with impairments in static balance more so than dynamic. His history of cardiac related issues makes me suspicious for PAD although PN cannot be ruled out. His referring provider will be contacted about PAD suspicions. He will continue to be seen for skilled PT intervention to address deficit in order to improve global mobility and function.    Prognosis: fair    Goals  Plan Goals: STG (3 visits)  1. Patient will report compliance with initial HEP.   2. Patient to improve GAMBINO balance score to >/= 53 /56 to decrease patient's risk of falls.  3. Patient to improve FGA score to >/= 26 /30 to decrease patient's risk of falls and improve community ambulation.    LTG (6 visits)  1. Patient will be I with final HEP.   2. Patient to improve GAMBINO balance score to >/= 56 /56 to decrease patient's risk of falls.  3. Patient to improve FGA score to >/= 28/30 to decrease patient's risk of falls and improve community ambulation.        Plan  Therapy options: will be seen for skilled therapy services  Planned modality interventions: TENS  Planned therapy interventions: ADL retraining, balance/weight-bearing training, flexibility, gait training, manual therapy, neuromuscular re-education, motor coordination training, postural training, strengthening, stretching, therapeutic activities, transfer training and home exercise program  Frequency: 1x week  Duration in visits: 6  Treatment plan discussed with: patient  Plan details: Patient will be seen 1x/wk  x 6wks with treatment to include strengthening, stretching, manual therapy, neuromuscular re-education, balance, gait and endurance training.           Timed:  Manual Therapy:    0     mins  98827;  Therapeutic Exercise:    10     mins  72803;     Neuromuscular Sanjuana:    0    mins  54580;    Therapeutic Activity:     0     mins  03111;     Gait Trainin     mins  95476;     Electrical Stimulation:    0     mins  46938 ( );    Untimed:  Canalith Repositioning    0     mins 08537    Timed Treatment:   10   mins   Total Treatment:     45   mins    PT SIGNATURE: Luli Sanabria, PT, DPT, MSCS, CDP, CSRS  [place] License #[id number]  DATE TREATMENT INITIATED: 10/16/2023      Initial Certification Certification Period: 10/16/1279osyb2  I certify that the therapy services are furnished while this patient is under my care.  The services outlined above are required by this patient, and will be reviewed every 90 days.     PHYSICIAN: Sophia Amos APRN  NPI: [id number]                                         DATE:     Please sign and return via fax to [phone].   Thank you,   [facility].

## 2023-10-17 ENCOUNTER — LAB (OUTPATIENT)
Dept: LAB | Facility: HOSPITAL | Age: 63
End: 2023-10-17
Payer: COMMERCIAL

## 2023-10-17 DIAGNOSIS — R80.1 PERSISTENT PROTEINURIA: ICD-10-CM

## 2023-10-17 LAB
QT INTERVAL: 348 MS
QT INTERVAL: 362 MS
QTC INTERVAL: 427 MS
QTC INTERVAL: 451 MS

## 2023-10-17 PROCEDURE — 84155 ASSAY OF PROTEIN SERUM: CPT

## 2023-10-17 PROCEDURE — 36415 COLL VENOUS BLD VENIPUNCTURE: CPT

## 2023-10-17 PROCEDURE — 84165 PROTEIN E-PHORESIS SERUM: CPT

## 2023-10-19 LAB
ALBUMIN SERPL ELPH-MCNC: 4.2 G/DL (ref 2.9–4.4)
ALBUMIN/GLOB SERPL: 1.6 {RATIO} (ref 0.7–1.7)
ALPHA1 GLOB SERPL ELPH-MCNC: 0.3 G/DL (ref 0–0.4)
ALPHA2 GLOB SERPL ELPH-MCNC: 0.6 G/DL (ref 0.4–1)
B-GLOBULIN SERPL ELPH-MCNC: 0.9 G/DL (ref 0.7–1.3)
GAMMA GLOB SERPL ELPH-MCNC: 0.9 G/DL (ref 0.4–1.8)
GLOBULIN SER CALC-MCNC: 2.7 G/DL (ref 2.2–3.9)
LABORATORY COMMENT REPORT: NORMAL
M PROTEIN SERPL ELPH-MCNC: NORMAL G/DL
PROT PATTERN SERPL ELPH-IMP: NORMAL
PROT SERPL-MCNC: 6.9 G/DL (ref 6–8.5)

## 2023-10-20 ENCOUNTER — OFFICE VISIT (OUTPATIENT)
Dept: CARDIOLOGY | Facility: HOSPITAL | Age: 63
End: 2023-10-20
Payer: COMMERCIAL

## 2023-10-20 ENCOUNTER — LAB (OUTPATIENT)
Dept: LAB | Facility: HOSPITAL | Age: 63
End: 2023-10-20
Payer: COMMERCIAL

## 2023-10-20 VITALS
DIASTOLIC BLOOD PRESSURE: 63 MMHG | HEIGHT: 72 IN | TEMPERATURE: 97 F | RESPIRATION RATE: 18 BRPM | OXYGEN SATURATION: 97 % | HEART RATE: 88 BPM | BODY MASS INDEX: 31.31 KG/M2 | SYSTOLIC BLOOD PRESSURE: 116 MMHG | WEIGHT: 231.2 LBS

## 2023-10-20 DIAGNOSIS — R80.1 PERSISTENT PROTEINURIA: ICD-10-CM

## 2023-10-20 PROCEDURE — 84166 PROTEIN E-PHORESIS/URINE/CSF: CPT

## 2023-10-20 PROCEDURE — 84156 ASSAY OF PROTEIN URINE: CPT

## 2023-10-20 RX ORDER — METOPROLOL SUCCINATE 25 MG/1
25 TABLET, EXTENDED RELEASE ORAL DAILY
Qty: 30 TABLET | Refills: 1 | Status: SHIPPED | OUTPATIENT
Start: 2023-10-20

## 2023-10-20 NOTE — PROGRESS NOTES
Chief Complaint  Shortness of Breath and Dizziness    Subjective      History of Present Illness {CC  Problem List  Visit  Diagnosis   Encounters  Notes  Medications  Labs  Result Review Imaging  Media :23}     Bala Staley III, 63 y.o. male with past medical history significant for anemia, arthritis, hypertension, GERD, gout, hyperlipidemia, and tremor, who presents to [facility] for Shortness of Breath and Dizziness.  At time of last evaluation, patient was ordered undergone stress test, echocardiogram, and Holter monitor.  During the time that patient wore his Holter monitor, he also presented to the ED on 10/9/2023 with complaint of palpitations.  EKG at that time showed sinus rhythm with PACs, rate 84.    Since time of evaluation in ED, he continues to have palpitations daily, almost continuously. This has increased in frequency recently.  He denies chest pain/pressure, syncope, near syncope, and lower extremity edema.  He endorses shortness of air during episodes of palpitation and worsening fatigue.  Currently, patient is not checking blood pressure or heart rate consistently at home.  He does state that he has recently been taken off of his losartan due to borderline hypotension.    Holter monitor 10/16/2023:  Diagnostic time 5 days, 9 hours.  Minimum heart rate 60, average 91, maximum 170.  Patient with 420 episodes of SVT, longest lasting 11 beats, fastest lasting 3 beats.  Patient with PAC burden of 7%.  PVC burden less than 1%.  Patient with 19 triggers, 1 associated with SVT, 7 associated with sinus tach, 4 were associated with PVC, 7 associated with PAC, 1 associated to other arrhythmia, and 8 of which were unassociated.    Echocardiogram 10/30/2023:    Left ventricular systolic function is normal. Left ventricular ejection fraction appears to be 51 - 55%.    Left ventricular wall thickness is consistent with mild concentric hypertrophy.    Left ventricular  "diastolic function is consistent with (grade Ia w/high LAP) impaired relaxation.    Borderline dilation of the ascending aorta is present. Ascending aorta = 4.1 cm      Initial presentation:  The patient was evaluated by neurology on 9/19/2023 at which time patient endorsed dyspnea on exertion.  Patient stated at that time that minimal activity caused him to become acutely short of air.  Patient was referred to [facility] for evaluation of dyspnea on exertion.    Patient states shortness of air has been ongoing for months, which has been worse with minimal acitivity. He endorses some chest \"tightness\", but overall denies chest pain. He also endorses palpitations which occurs daily. He endorses fatigue, altered balance, increased sweating, near syncope, and heat intolerance. Denies any swelling to lower extremities. He is checking blood pressure at home which is running around 117/78, HR running in the 90's.     Caffeine intake of 1 soda daily  Water intake adequate  ETOH two daily drinks    Son with atrial fib and aortic valve replacement  Father with CAD/CABG in his 60's    Echocardiogram 11/13/2018:  Left ventricular systolic function is normal.  Estimated EF appears to be in the range of 56 - 60%.  Left ventricular wall thickness is consistent with mild concentric hypertrophy.    SPECT stress test 4/10/2018:  Exercise cardiolite within normal limits.  Left ventricular ejection fraction is normal (Calculated EF = 68%).  No significant ST or t wave changes noted  Occasional PVC and ventricular couplet noted.      Objective     Vital Signs:   Vitals:    10/20/23 1512   BP: 116/63   BP Location: Left arm   Patient Position: Sitting   Cuff Size: Adult   Pulse: 88   Resp: 18   Temp: 97 °F (36.1 °C)   TempSrc: Temporal   SpO2: 97%   Weight: 105 kg (231 lb 3.2 oz)   Height: 182.9 cm (72\")       Body mass index is 31.36 kg/m².  Physical Exam  Vitals and nursing note reviewed.   Constitutional:       " Appearance: Normal appearance.   HENT:      Head: Normocephalic.   Eyes:      Pupils: Pupils are equal, round, and reactive to light.   Cardiovascular:      Rate and Rhythm: Normal rate. Rhythm irregular.      Pulses: Normal pulses.      Heart sounds: Normal heart sounds. No murmur heard.  Pulmonary:      Effort: Pulmonary effort is normal.      Breath sounds: Normal breath sounds.   Abdominal:      General: Bowel sounds are normal.      Palpations: Abdomen is soft.   Musculoskeletal:         General: Normal range of motion.      Cervical back: Normal range of motion.      Right lower leg: No edema.      Left lower leg: No edema.   Skin:     General: Skin is warm and dry.      Capillary Refill: Capillary refill takes less than 2 seconds.   Neurological:      Mental Status: He is alert and oriented to person, place, and time.   Psychiatric:         Mood and Affect: Mood normal.         Thought Content: Thought content normal.                Data Reviewed:{ Labs  Result Review  Imaging  Med Tab  Media :23}   ECG 12 Lead (09/19/2022 15:41)  ECG 12 Lead (06/25/2021 08:51)  Adult Transthoracic Echo Complete W/ Cont if Necessary Per Protocol (11/13/2018 09:46)    Lab Results   Component Value Date    WBC 5.64 10/09/2023    HGB 17.6 10/09/2023    HCT 50.3 10/09/2023    .9 (H) 10/09/2023     10/09/2023      Lab Results   Component Value Date    GLUCOSE 120 (H) 10/09/2023    BUN 10 10/09/2023    CREATININE 0.92 10/09/2023    EGFR 93.5 10/09/2023    BCR 10.9 10/09/2023    K 3.9 10/09/2023    CO2 25.0 10/09/2023    CALCIUM 9.9 10/09/2023    PROTENTOTREF 6.9 10/17/2023    ALBUMIN 4.2 10/17/2023    BILITOT 1.0 10/09/2023     (H) 10/09/2023     (H) 10/09/2023      Lab Results   Component Value Date    CKTOTAL 190 08/30/2023    TROPONINI 0.017 11/13/2018    TROPONINT 19 (H) 10/09/2023      MRI Brain With & Without Contrast (09/05/2023 19:29)     Assessment & Plan   Assessment and Plan {CC Problem  List  Visit Diagnosis  ROS  Review (Popup)  [facility]  BestPractice  Medications  SmartSets  SnapShot Encounters  Media :23}     1. Dyspnea on exertion  -Results of echocardiogram discussed with patient today while in office.  Patient states that results were also discussed briefly with Dr. Kim's office.  -Echocardiogram 10/30/2023:    Left ventricular systolic function is normal. Left ventricular ejection fraction appears to be 51 - 55%.    Left ventricular wall thickness is consistent with mild concentric hypertrophy.    Left ventricular diastolic function is consistent with (grade Ia w/high LAP) impaired relaxation.    Borderline dilation of the ascending aorta is present. Ascending aorta = 4.1 cm  -Will obtain stress test to evaluate for any ischemic causes which could be contributing to patient's symptoms, still awaiting testing at today's visit  - Stress Test With Myocardial Perfusion (1 Day); Future      2. Palpitations  -Holter monitor 10/16/2023:  Diagnostic time 5 days, 9 hours.  Minimum heart rate 60, average 91, maximum 170.  Patient with 420 episodes of SVT, longest lasting 11 beats, fastest lasting 3 beats.  Patient with PAC burden of 7%.  PVC burden less than 1%.  Patient with 19 triggers, 1 associated with SVT, 7 associated with sinus tach, 4 were associated with PVC, 7 associated with PAC, 1 associated to other arrhythmia, and 8 of which were unassociated.  -Patient with significant PAC burden  -Will initiate metoprolol succinate 25 mg to be taken once a day  -Encouraged patient to begin ambulatory blood pressure monitoring and heart rate monitoring.  Patient was instructed to hold metoprolol for systolic blood pressure less than 100.    3. Premature atrial complexes  -Patient with significant PAC burden noted on recent Holter monitor  -We will await results of stress test prior to decision on prolonged heart monitoring  - Stress Test With Myocardial Perfusion (1  Day); Future    4. Other fatigue  -Patient continues to have ongoing fatigue with constant palpitations  -Plan to follow up with patient after stress test is completed via video visit          Follow Up {Instructions Charge Capture  Follow-up Communications :23}     Return in about 11 days (around 10/31/2023) for Video visit, Monitor Results.      Patient was given instructions and counseling regarding his condition or for health maintenance advice. Please see specific information pulled into the AVS if appropriate.  Patient was instructed to call the [facility] with any questions, concerns, or worsening symptoms.    Dictated Utilizing Dragon Dictation   Please note that portions of this note were completed with a voice recognition program.   Part of this note may be an electronic transcription/translation of spoken language to printed text using the Dragon Dictation System.

## 2023-10-20 NOTE — PATIENT INSTRUCTIONS
Hold metoprolol succinate the evening dose before stress test on 10/26    Start checking blood pressure at home before dose of metoprolol. If blood pressure top number is less than 100 DO NOT TAKE metoprolol. If holding metoprolol more than one day, call clinic.

## 2023-10-23 ENCOUNTER — TREATMENT (OUTPATIENT)
Dept: PHYSICAL THERAPY | Facility: CLINIC | Age: 63
End: 2023-10-23
Payer: COMMERCIAL

## 2023-10-23 DIAGNOSIS — R26.89 IMPAIRMENT OF BALANCE: Primary | ICD-10-CM

## 2023-10-23 PROCEDURE — 97112 NEUROMUSCULAR REEDUCATION: CPT | Performed by: PHYSICAL THERAPIST

## 2023-10-23 PROCEDURE — 97110 THERAPEUTIC EXERCISES: CPT | Performed by: PHYSICAL THERAPIST

## 2023-10-23 NOTE — PROGRESS NOTES
Physical Therapy Daily Note  Visit: 2  Date of Initial Visit: Type: THERAPY  Noted: 10/16/2023    Patient: Bala Staley III   : 1960  Diagnosis/ICD-10 Code:  Impairment of balance [R26.89]  Referring practitioner: NORTH Garcia*  Date of Initial Visit: Type: THERAPY  Noted: 10/16/2023  Today's Date: 10/23/2023  Patient seen for 2 sessions    Subjective:   Patient reports: he has a stress test this week.   Pain: 0/10  Clinical Progress: unchanged  Home Program Compliance: Yes  Treatment has included: therapeutic exercise and neuromuscular re-education    Objective   See Exercise, Manual, and Modality Logs for complete treatment.    PT Neuro          Assessment & Plan       Assessment  Assessment details: Patient was very challenged during light warm up on a nu-step at level 5 x 5 min. He immediately began sweating with a BP of 158/87 and HR of 100. Later in the session he HR was 58 with a BP of 130/70. Patient will have a stress test this week and follow up with cardiology. He will continue to be progressed with balance exercises.     Plan  Plan details: Patient to continue with PT services to improve gait, balance, strength, transfers and overall functional mobility.          Timed:  Manual Therapy:            0     mins  57230;  Therapeutic Exercise:    8    mins  53668;     Neuromuscular Sanjuana:    30    mins  82938;    Therapeutic Activity:      0     mins  51195;     Gait Trainin    mins  51114;     Electrical Stimulation:    0    mins  37034 ( );     Untimed:  Canalith Repositioning techniques _[id number]      Timed Treatment:   38   mins   Total Treatment:     38   mins      Luli Sanabria, PT, DPT, MSCS, CDP, CSRS  [place] License #: [id number]  Physical Therapist

## 2023-10-24 LAB
ALBUMIN 24H MFR UR ELPH: 19.8 %
ALPHA1 GLOB 24H MFR UR ELPH: 6.1 %
ALPHA2 GLOB 24H MFR UR ELPH: 19 %
B-GLOBULIN 24H MFR UR ELPH: 34.4 %
GAMMA GLOB 24H MFR UR ELPH: 20.6 %
LABORATORY COMMENT REPORT: ABNORMAL
M PROTEIN 24H MFR UR ELPH: ABNORMAL %
PROT 24H UR-MRATE: 186 MG/24 HR (ref 30–150)
PROT UR-MCNC: 8.1 MG/DL

## 2023-10-26 ENCOUNTER — HOSPITAL ENCOUNTER (OUTPATIENT)
Dept: CARDIOLOGY | Facility: HOSPITAL | Age: 63
Discharge: HOME | End: 2023-10-26
Payer: COMMERCIAL

## 2023-10-26 VITALS
HEART RATE: 88 BPM | WEIGHT: 231 LBS | DIASTOLIC BLOOD PRESSURE: 90 MMHG | SYSTOLIC BLOOD PRESSURE: 124 MMHG | HEIGHT: 72 IN | BODY MASS INDEX: 31.29 KG/M2

## 2023-10-26 DIAGNOSIS — R06.09 DYSPNEA ON EXERTION: ICD-10-CM

## 2023-10-26 DIAGNOSIS — I49.1 PREMATURE ATRIAL COMPLEXES: ICD-10-CM

## 2023-10-26 DIAGNOSIS — R00.2 PALPITATIONS: ICD-10-CM

## 2023-10-26 LAB
BH CV REST NUCLEAR ISOTOPE DOSE: 9.9 MCI
BH CV STRESS BP STAGE 1: NORMAL
BH CV STRESS BP STAGE 2: NORMAL
BH CV STRESS BP STAGE 3: NORMAL
BH CV STRESS DURATION MIN STAGE 1: 3
BH CV STRESS DURATION MIN STAGE 2: 3
BH CV STRESS DURATION MIN STAGE 3: 2
BH CV STRESS DURATION SEC STAGE 1: 0
BH CV STRESS DURATION SEC STAGE 2: 0
BH CV STRESS DURATION SEC STAGE 3: 11
BH CV STRESS GRADE STAGE 1: 10
BH CV STRESS GRADE STAGE 2: 12
BH CV STRESS GRADE STAGE 3: 14
BH CV STRESS HR STAGE 1: 111
BH CV STRESS HR STAGE 2: 126
BH CV STRESS HR STAGE 3: 171
BH CV STRESS METS STAGE 1: 5
BH CV STRESS METS STAGE 2: 7.5
BH CV STRESS METS STAGE 3: 10
BH CV STRESS NUCLEAR ISOTOPE DOSE: 33 MCI
BH CV STRESS O2 STAGE 1: 100
BH CV STRESS O2 STAGE 2: 95
BH CV STRESS O2 STAGE 3: 98
BH CV STRESS PROTOCOL 1: NORMAL
BH CV STRESS RECOVERY BP: NORMAL MMHG
BH CV STRESS RECOVERY HR: 104 BPM
BH CV STRESS SPEED STAGE 1: 1.7
BH CV STRESS SPEED STAGE 2: 2.5
BH CV STRESS SPEED STAGE 3: 3.4
BH CV STRESS STAGE 1: 1
BH CV STRESS STAGE 2: 2
BH CV STRESS STAGE 3: 3
LV EF NUC BP: 79 %
MAXIMAL PREDICTED HEART RATE: 157 BPM
PERCENT MAX PREDICTED HR: 108.92 %
STRESS BASELINE BP: NORMAL MMHG
STRESS BASELINE HR: 83 BPM
STRESS O2 SAT REST: 99 %
STRESS PERCENT HR: 128 %
STRESS POST ESTIMATED WORKLOAD: 10.1 METS
STRESS POST EXERCISE DUR MIN: 8 MIN
STRESS POST EXERCISE DUR SEC: 11 SEC
STRESS POST O2 SAT PEAK: 97 %
STRESS POST PEAK BP: NORMAL MMHG
STRESS POST PEAK HR: 171 BPM
STRESS TARGET HR: 133 BPM

## 2023-10-26 PROCEDURE — 93017 CV STRESS TEST TRACING ONLY: CPT

## 2023-10-26 PROCEDURE — A9500 TC99M SESTAMIBI: HCPCS | Performed by: NURSE PRACTITIONER

## 2023-10-26 PROCEDURE — 78452 HT MUSCLE IMAGE SPECT MULT: CPT

## 2023-10-26 PROCEDURE — 0 TECHNETIUM SESTAMIBI: Performed by: NURSE PRACTITIONER

## 2023-10-26 RX ADMIN — TECHNETIUM TC 99M SESTAMIBI 1 DOSE: 1 INJECTION INTRAVENOUS at 10:37

## 2023-10-26 RX ADMIN — TECHNETIUM TC 99M SESTAMIBI 1 DOSE: 1 INJECTION INTRAVENOUS at 12:40

## 2023-10-27 NOTE — PROGRESS NOTES
Your stress test was normal and showed no blockages.  Your ejection fraction is 70% which shows an efficient heart

## 2023-10-30 NOTE — PROGRESS NOTES
Mode of visit: Video  Location of patient: Home   You have chosen to receive care through the use of telemedicine. Telemedicine enables health care providers at different locations to provide safe, effective, and convenient care through the use of technology. As with any health care service, there are risks associated with the use of telemedicine, including equipment failure, poor connections, and  issues.  Do you understand the risks and benefits of telemedicine as I have explained them to you? Yes  Have your questions regarding telemedicine been answered? Yes  Do you consent to the use of telemedicine in your medical care today? Yes    [facility]  [facility]  Telemedicine note    This was a video enabled telemedicine encounter.  Chief Complaint  Palpitations    Subjective      History of Present Illness {CC  Problem List  Visit  Diagnosis   Encounters  Notes  Medications  Labs  Result Review Imaging  Media :23}     Bala Staley III, 63 y.o. male with past medical history significant for anemia, arthritis, hypertension, GERD, gout, hyperlipidemia, and tremor, who presents to [facility] for Palpitations.  At time of last visit, results of recent Holter monitor were discussed with patient.  Due to frequent episodes of SVT and PAC burden of 7%, patient was started on metoprolol.  Since initiation of that medication, patient states that symptoms have improved.  He currently denies any chest pain or pressure, shortness of air, syncope, near syncope, lower extremity edema, or fatigue.    Patient states that he has been checking blood pressure at home intermittently.  Blood pressure has been as low as 95/68 but does not consistently run this low.    SPECT stress test 10/26/2023:    Normal exercise capacity with normal hemodynamic response to exercise.    Expected exercise duration = 8:20 Actual = 8:11 BESSIE = 0. Impressions are consistent  with a low risk study.    Negative treadmill stress test for anginal chest pain or diagnostic ST segment changes at high workload and target heart rate.    Myocardial perfusion imaging indicates a normal myocardial perfusion study with no evidence of ischemia.    Left ventricular ejection fraction is hyperdynamic (Calculated EF > 70%).    Impressions are consistent with a low risk study.      At time of last evaluation, patient was ordered  stress test, echocardiogram, and Holter monitor.  During the time that patient wore his Holter monitor, he also presented to the ED on 10/9/2023 with complaint of palpitations.  EKG at that time showed sinus rhythm with PACs, rate 84.    Since time of evaluation in ED, he continues to have palpitations daily, almost continuously. This has increased in frequency recently.  He denies chest pain/pressure, syncope, near syncope, and lower extremity edema.  He endorses shortness of air during episodes of palpitation and worsening fatigue.  Currently, patient is not checking blood pressure or heart rate consistently at home.  He does state that he has recently been taken off of his losartan due to borderline hypotension.    Holter monitor 10/16/2023:  Diagnostic time 5 days, 9 hours.  Minimum heart rate 60, average 91, maximum 170.  Patient with 420 episodes of SVT, longest lasting 11 beats, fastest lasting 3 beats.  Patient with PAC burden of 7%.  PVC burden less than 1%.  Patient with 19 triggers, 1 associated with SVT, 7 associated with sinus tach, 4 were associated with PVC, 7 associated with PAC, 1 associated to other arrhythmia, and 8 of which were unassociated.    Echocardiogram 10/30/2023:    Left ventricular systolic function is normal. Left ventricular ejection fraction appears to be 51 - 55%.    Left ventricular wall thickness is consistent with mild concentric hypertrophy.    Left ventricular diastolic function is consistent with (grade Ia w/high LAP) impaired relaxation.    " Borderline dilation of the ascending aorta is present. Ascending aorta = 4.1 cm      Initial presentation:  The patient was evaluated by neurology on 9/19/2023 at which time patient endorsed dyspnea on exertion.  Patient stated at that time that minimal activity caused him to become acutely short of air.  Patient was referred to heart and valve Center for evaluation of dyspnea on exertion.    Patient states shortness of air has been ongoing for months, which has been worse with minimal acitivity. He endorses some chest \"tightness\", but overall denies chest pain. He also endorses palpitations which occurs daily. He endorses fatigue, altered balance, increased sweating, near syncope, and heat intolerance. Denies any swelling to lower extremities. He is checking blood pressure at home which is running around 117/78, HR running in the 90's.     Caffeine intake of 1 soda daily  Water intake adequate  ETOH two daily drinks    Son with atrial fib and aortic valve replacement  Father with CAD/CABG in his 60's    Echocardiogram 11/13/2018:  Left ventricular systolic function is normal.  Estimated EF appears to be in the range of 56 - 60%.  Left ventricular wall thickness is consistent with mild concentric hypertrophy.    SPECT stress test 4/10/2018:  Exercise cardiolite within normal limits.  Left ventricular ejection fraction is normal (Calculated EF = 68%).  No significant ST or t wave changes noted  Occasional PVC and ventricular couplet noted.      Objective     Vital Signs:   Vitals:         There is no height or weight on file to calculate BMI.  Physical Exam  Vitals and nursing note reviewed.   Constitutional:       Appearance: Normal appearance.   HENT:      Head: Normocephalic.   Pulmonary:      Effort: Pulmonary effort is normal. No respiratory distress.   Musculoskeletal:         General: Normal range of motion.      Cervical back: Normal range of motion.   Skin:     General: Skin is warm and dry.   Neurological: "      Mental Status: He is alert and oriented to person, place, and time.   Psychiatric:         Mood and Affect: Mood normal.         Thought Content: Thought content normal.                Data Reviewed:{ Labs  Result Review  Imaging  Med Tab  Media :23}   ECG 12 Lead (09/19/2022 15:41)  ECG 12 Lead (06/25/2021 08:51)  Adult Transthoracic Echo Complete W/ Cont if Necessary Per Protocol (11/13/2018 09:46)    Lab Results   Component Value Date    WBC 5.64 10/09/2023    HGB 17.6 10/09/2023    HCT 50.3 10/09/2023    .9 (H) 10/09/2023     10/09/2023      Lab Results   Component Value Date    GLUCOSE 120 (H) 10/09/2023    BUN 10 10/09/2023    CREATININE 0.92 10/09/2023    EGFR 93.5 10/09/2023    BCR 10.9 10/09/2023    K 3.9 10/09/2023    CO2 25.0 10/09/2023    CALCIUM 9.9 10/09/2023    PROTENTOTREF 6.9 10/17/2023    ALBUMIN 4.2 10/17/2023    BILITOT 1.0 10/09/2023     (H) 10/09/2023     (H) 10/09/2023      Lab Results   Component Value Date    CKTOTAL 190 08/30/2023    TROPONINI 0.017 11/13/2018    TROPONINT 19 (H) 10/09/2023      MRI Brain With & Without Contrast (09/05/2023 19:29)     Assessment & Plan   Assessment and Plan {CC Problem List  Visit Diagnosis  ROS  Review (Popup)  [facility] Maintenance  Quality  BestPractice  Medications  SmartSets  SnapShot Encounters  Media :23}     1. Dyspnea on exertion  -Shortness of air has recently improved slightly  -Echocardiogram 10/30/2023:    Left ventricular systolic function is normal. Left ventricular ejection fraction appears to be 51 - 55%.    Left ventricular wall thickness is consistent with mild concentric hypertrophy.    Left ventricular diastolic function is consistent with (grade Ia w/high LAP) impaired relaxation.    Borderline dilation of the ascending aorta is present. Ascending aorta = 4.1 cm  -SPECT stress test 10/26/2023:    Normal exercise capacity with normal hemodynamic response to exercise.    Expected exercise  duration = 8:20 Actual = 8:11 BESSIE = 0. Impressions are consistent with a low risk study.    Negative treadmill stress test for anginal chest pain or diagnostic ST segment changes at high workload and target heart rate.    Myocardial perfusion imaging indicates a normal myocardial perfusion study with no evidence of ischemia.    Left ventricular ejection fraction is hyperdynamic (Calculated EF > 70%).    Impressions are consistent with a low risk study.      2. Palpitations  -Holter monitor 10/16/2023:  Diagnostic time 5 days, 9 hours.  Minimum heart rate 60, average 91, maximum 170.  Patient with 420 episodes of SVT, longest lasting 11 beats, fastest lasting 3 beats.  Patient with PAC burden of 7%.  PVC burden less than 1%.  Patient with 19 triggers, 1 associated with SVT, 7 associated with sinus tach, 4 were associated with PVC, 7 associated with PAC, 1 associated to other arrhythmia, and 8 of which were unassociated.  -Patient with significant PAC burden  -Will continue metoprolol succinate 25 mg to be taken once a day  -Encouraged patient to begin ambulatory blood pressure monitoring and heart rate monitoring.  Patient was instructed to hold metoprolol for systolic blood pressure less than 100.  -We will refer patient to cardiology for numerous episodes of SVT and significant AC burden despite short monitoring time.    3. Premature atrial complexes  -Patient with significant PAC burden noted on recent Holter monitor    4. Other fatigue  -Fatigue has slightly improved recently    5.  Paroxysmal SVT  -Patient with numerous episodes of SVT noted during Holter monitor.  Holter monitor was terminated at 5 days, 9 hours after patient had visit to the emergency room at which time he was concern for atrial fibrillation.  -We will refer patient to cardiology due to numerous episodes of SVT and significant PAC burden  -Defer further monitoring to cardiology  -Patient may follow-up in valve center as needed          Follow  Up {Instructions Charge Capture  Follow-up Communications :23}     No follow-ups on file.      Patient was given instructions and counseling regarding his condition or for health maintenance advice. Please see specific information pulled into the AVS if appropriate.  Patient was instructed to call the Heart and Val[facility] Center with any questions, concerns, or worsening symptoms.    Dictated Utilizing Dragon Dictation   Please note that portions of this note were completed with a voice recognition program.   Part of this note may be an electronic transcription/translation of spoken language to printed text using the Dragon Dictation System.

## 2023-10-31 ENCOUNTER — TELEMEDICINE (OUTPATIENT)
Dept: CARDIOLOGY | Facility: HOSPITAL | Age: 63
End: 2023-10-31
Payer: COMMERCIAL

## 2023-10-31 DIAGNOSIS — I49.1 PREMATURE ATRIAL COMPLEXES: Primary | ICD-10-CM

## 2023-10-31 DIAGNOSIS — I47.10 PAROXYSMAL SVT (SUPRAVENTRICULAR TACHYCARDIA): ICD-10-CM

## 2023-10-31 PROCEDURE — 99213 OFFICE O/P EST LOW 20 MIN: CPT | Performed by: NURSE PRACTITIONER

## 2023-11-06 ENCOUNTER — TREATMENT (OUTPATIENT)
Dept: PHYSICAL THERAPY | Facility: CLINIC | Age: 63
End: 2023-11-06
Payer: COMMERCIAL

## 2023-11-06 DIAGNOSIS — R26.89 IMPAIRMENT OF BALANCE: Primary | ICD-10-CM

## 2023-11-06 PROCEDURE — 97110 THERAPEUTIC EXERCISES: CPT | Performed by: PHYSICAL THERAPIST

## 2023-11-06 PROCEDURE — 97112 NEUROMUSCULAR REEDUCATION: CPT | Performed by: PHYSICAL THERAPIST

## 2023-11-07 NOTE — PROGRESS NOTES
Physical Therapy Daily Note  Visit: 3  Date of Initial Visit: Type: THERAPY  Noted: 10/16/2023    Patient: Bala Staley III   : 1960  Diagnosis/ICD-10 Code:  Impairment of balance [R26.89]  Referring practitioner: NORTH Garcia*  Date of Initial Visit: Type: THERAPY  Noted: 10/16/2023  Today's Date: 2023  Patient seen for 3 sessions      Subjective:   Patient reports: he has been doing decent but the last 2 days he has been all over the place with his balance and hand tremors.   Pain: 0/10  Clinical Progress: unchanged  Home Program Compliance: Yes  Treatment has included: therapeutic exercise and neuromuscular re-education    Objective   See Exercise, Manual, and Modality Logs for complete treatment.    PT Neuro          Assessment & Plan       Assessment  Assessment details: Patient demonstrates continued balance deficits, however they seem to correspond to systemic systems, particularly related to cardiac function. Pt continues to be very diaphoretic with quick onset, along with swollen B ankles/feet that do tend to be purple in color. Patient will continue with therapy to address symptoms, however I have my concerns at how effective treatment will be until cardiac issues are resolved.     Plan  Plan details: Patient to continue with PT services to improve gait, balance, strength, transfers and overall functional mobility.          Timed:  Manual Therapy:            0     mins  78348;  Therapeutic Exercise:    8    mins  16486;     Neuromuscular Sanjuana:    28    mins  92931;    Therapeutic Activity:      0     mins  32346;     Gait Trainin    mins  97439;     Electrical Stimulation:    0    mins  33689 ( );     Untimed:  Canalith Repositioning techniques [id number]      Timed Treatment:   38   mins   Total Treatment:     38   mins      Luli Sanabria, PT, DPT, MSCS, CDP, CSRS  [place] License #: [id number]  Physical Therapist

## 2023-11-09 ENCOUNTER — LAB (OUTPATIENT)
Dept: LAB | Facility: HOSPITAL | Age: 63
End: 2023-11-09
Payer: COMMERCIAL

## 2023-11-09 ENCOUNTER — OFFICE VISIT (OUTPATIENT)
Dept: GASTROENTEROLOGY | Facility: CLINIC | Age: 63
End: 2023-11-09
Payer: COMMERCIAL

## 2023-11-09 VITALS
DIASTOLIC BLOOD PRESSURE: 82 MMHG | SYSTOLIC BLOOD PRESSURE: 130 MMHG | WEIGHT: 227.4 LBS | BODY MASS INDEX: 30.83 KG/M2 | OXYGEN SATURATION: 97 % | HEART RATE: 83 BPM | TEMPERATURE: 97.3 F

## 2023-11-09 DIAGNOSIS — Z78.9 ALCOHOL USE: ICD-10-CM

## 2023-11-09 DIAGNOSIS — R74.8 ELEVATED LIVER ENZYMES: ICD-10-CM

## 2023-11-09 DIAGNOSIS — K76.0 FATTY LIVER: Primary | ICD-10-CM

## 2023-11-09 DIAGNOSIS — K76.0 FATTY LIVER: ICD-10-CM

## 2023-11-09 LAB
ALBUMIN SERPL-MCNC: 4.4 G/DL (ref 3.5–5.2)
ALBUMIN/GLOB SERPL: 1.6 G/DL
ALP SERPL-CCNC: 97 U/L (ref 39–117)
ALT SERPL W P-5'-P-CCNC: 128 U/L (ref 1–41)
ANION GAP SERPL CALCULATED.3IONS-SCNC: 13.4 MMOL/L (ref 5–15)
AST SERPL-CCNC: 130 U/L (ref 1–40)
BASOPHILS # BLD AUTO: 0.04 10*3/MM3 (ref 0–0.2)
BASOPHILS NFR BLD AUTO: 1.1 % (ref 0–1.5)
BILIRUB SERPL-MCNC: 0.6 MG/DL (ref 0–1.2)
BUN SERPL-MCNC: 9 MG/DL (ref 8–23)
BUN/CREAT SERPL: 10.7 (ref 7–25)
CALCIUM SPEC-SCNC: 10.1 MG/DL (ref 8.6–10.5)
CHLORIDE SERPL-SCNC: 101 MMOL/L (ref 98–107)
CO2 SERPL-SCNC: 24.6 MMOL/L (ref 22–29)
CREAT SERPL-MCNC: 0.84 MG/DL (ref 0.76–1.27)
DEPRECATED RDW RBC AUTO: 45 FL (ref 37–54)
EGFRCR SERPLBLD CKD-EPI 2021: 98 ML/MIN/1.73
EOSINOPHIL # BLD AUTO: 0.17 10*3/MM3 (ref 0–0.4)
EOSINOPHIL NFR BLD AUTO: 4.7 % (ref 0.3–6.2)
ERYTHROCYTE [DISTWIDTH] IN BLOOD BY AUTOMATED COUNT: 11.9 % (ref 12.3–15.4)
GLOBULIN UR ELPH-MCNC: 2.7 GM/DL
GLUCOSE SERPL-MCNC: 107 MG/DL (ref 65–99)
HCT VFR BLD AUTO: 48.5 % (ref 37.5–51)
HGB BLD-MCNC: 17.4 G/DL (ref 13–17.7)
IMM GRANULOCYTES # BLD AUTO: 0.01 10*3/MM3 (ref 0–0.05)
IMM GRANULOCYTES NFR BLD AUTO: 0.3 % (ref 0–0.5)
INR PPP: 1.04 (ref 0.89–1.12)
LYMPHOCYTES # BLD AUTO: 0.74 10*3/MM3 (ref 0.7–3.1)
LYMPHOCYTES NFR BLD AUTO: 20.6 % (ref 19.6–45.3)
MCH RBC QN AUTO: 37 PG (ref 26.6–33)
MCHC RBC AUTO-ENTMCNC: 35.9 G/DL (ref 31.5–35.7)
MCV RBC AUTO: 103.2 FL (ref 79–97)
MONOCYTES # BLD AUTO: 0.5 10*3/MM3 (ref 0.1–0.9)
MONOCYTES NFR BLD AUTO: 13.9 % (ref 5–12)
NEUTROPHILS NFR BLD AUTO: 2.13 10*3/MM3 (ref 1.7–7)
NEUTROPHILS NFR BLD AUTO: 59.4 % (ref 42.7–76)
NRBC BLD AUTO-RTO: 0 /100 WBC (ref 0–0.2)
PLATELET # BLD AUTO: 153 10*3/MM3 (ref 140–450)
PMV BLD AUTO: 11.3 FL (ref 6–12)
POTASSIUM SERPL-SCNC: 4 MMOL/L (ref 3.5–5.2)
PROT SERPL-MCNC: 7.1 G/DL (ref 6–8.5)
PROTHROMBIN TIME: 13.7 SECONDS (ref 12.2–14.5)
RBC # BLD AUTO: 4.7 10*6/MM3 (ref 4.14–5.8)
SODIUM SERPL-SCNC: 139 MMOL/L (ref 136–145)
WBC NRBC COR # BLD: 3.59 10*3/MM3 (ref 3.4–10.8)

## 2023-11-09 PROCEDURE — 85025 COMPLETE CBC W/AUTO DIFF WBC: CPT

## 2023-11-09 PROCEDURE — 80053 COMPREHEN METABOLIC PANEL: CPT | Performed by: PHYSICIAN ASSISTANT

## 2023-11-09 PROCEDURE — 85610 PROTHROMBIN TIME: CPT | Performed by: PHYSICIAN ASSISTANT

## 2023-11-09 PROCEDURE — 36415 COLL VENOUS BLD VENIPUNCTURE: CPT | Performed by: PHYSICIAN ASSISTANT

## 2023-11-09 NOTE — PROGRESS NOTES
[facility] Cardiology  Consultation H&P  Bala MELY Lukass III  1960  [address]  [place] [address]     VISIT DATE:  11/10/23    PCP: Wilfred Kim MD  [address] [facility] [address]  [place] [id number]    IDENTIFICATION: A 63 y.o. male   resident of [place], [place].   [facility] 2018    Problem List:  Dyspnea on Exertion  6/2011 normal cardiolite, EF 66%  10/23 Echo: LVEF 51-55%, mild conc LVH, grade Ia LV DD, ao 4.1 cm, no VHD  10/23 MPS normal, low risk, normal exercise capacity  10/23 BNP normal  Palpitations  10/23 with reported high PAC burden and multiple brief SVT episodes - idb  Hypertension  Dyslipidemia  10/23 171/87/80/75  A1c 5.2      CC:  Chief Complaint   Patient presents with    Shortness of Breath     always    Chest Pain     Fluttering in chest that is constant, happening currently    Tremors     In hand       Allergies  Allergies   Allergen Reactions    Contrast Dye (Echo Or Unknown Ct/Mr) Rash       Current Medications    Current Outpatient Medications:     allopurinol (ZYLOPRIM) 100 MG tablet, TAKE 1 TABLET BY MOUTH EVERY DAY, Disp: 90 tablet, Rfl: 1    amitriptyline (ELAVIL) 10 MG tablet, Take 1 tablet by mouth Every Night., Disp: 30 tablet, Rfl: 11    ASCORBIC ACID PO, Take 1 tablet by mouth Daily., Disp: , Rfl:     aspirin 81 MG EC tablet, Take 1 tablet by mouth Daily., Disp: 30 tablet, Rfl: 11    cetirizine (zyrTEC) 5 MG tablet, Take 1 tablet by mouth Daily., Disp: , Rfl:     colchicine 0.6 MG tablet, TAKE 1 TABLET BY MOUTH EVERY DAY, Disp: 30 tablet, Rfl: 1    esomeprazole (NexIUM) 40 MG packet, Take 1 capsule by mouth Every Night., Disp: , Rfl:     ferrous sulfate 325 (65 FE) MG EC tablet, Take 1 tablet by mouth Daily With Breakfast., Disp: 90 tablet, Rfl: 1    furosemide (LASIX) 20 MG tablet, TAKE 1 TABLET BY MOUTH TWICE A DAY (Patient taking differently: Take 1 tablet by mouth 2 (Two) Times a Day.), Disp: 180 tablet, Rfl: 1    MAGNESIUM  OXIDE PO, Take 250 mg by mouth Daily., Disp: , Rfl:     metoprolol succinate XL (TOPROL-XL) 25 MG 24 hr tablet, Take 1 tablet by mouth Daily., Disp: 30 tablet, Rfl: 1    potassium chloride (KLOR-CON) 20 MEQ CR tablet, Take 1 tablet by mouth Daily., Disp: 90 tablet, Rfl: 1    spironolactone (ALDACTONE) 25 MG tablet, Take 1 tablet by mouth Daily., Disp: , Rfl:     vitamin D (ERGOCALCIFEROL) 1.25 MG (89647 UT) capsule capsule, Take 1 capsule by mouth Every 30 (Thirty) Days., Disp: , Rfl:      History of Present Illness   HPI  Bala Staley III is a 63 y.o. year old male with the above mentioned PMH who presents for consult from No ref. provider found for reevaluation with complaints of palpitations, peripheral edema and shortness of breath.  He notes diffuse tremor and balance instability as well.  States that his son has history of atrial fibrillation  Pt denies any chest pain, dyspnea at rest, dyspnea on exertion, orthopnea, PNDlower extremity edema, or claudication. Pt denies history of CHF, DVT, PE, MI, CVA, TIA, or rheumatic fever.       ROS  Review of Systems   Cardiovascular:  Positive for dyspnea on exertion and palpitations.   Neurological:  Positive for loss of balance.   All other systems reviewed and are negative.      SOCIAL HX  Social History     Socioeconomic History    Marital status:    Tobacco Use    Smoking status: Never     Passive exposure: Never    Smokeless tobacco: Former     Types: Chew     Quit date: 2002   Vaping Use    Vaping Use: Never used   Substance and Sexual Activity    Alcohol use: Yes     Alcohol/week: 14.0 standard drinks of alcohol     Types: 14 Shots of liquor per week     Comment: per day, [place]     Drug use: No    Sexual activity: Yes     Partners: Female     Birth control/protection: None       FAMILY HX  Family History   Problem Relation Age of Onset    Alzheimer's disease Mother     Dementia Mother     Hypertension Mother     High cholesterol Mother     Colon polyps  "Father     Heart disease Father     Heart attack Father     Colon cancer Father     Coronary artery disease Father     Lung disease Sister     No Known Problems Maternal Grandmother     Alzheimer's disease Maternal Grandfather     Cancer Paternal Grandmother     Lung cancer Paternal Grandmother     Heart disease Paternal Grandfather     Heart disease Son     Esophageal cancer Neg Hx        Vitals:    11/10/23 1253   BP: 118/78   BP Location: Left arm   Patient Position: Sitting   Pulse: 93   SpO2: 97%   Weight: 104 kg (229 lb 12.8 oz)   Height: 182.9 cm (72\")     Body mass index is 31.17 kg/m².     PHYSICAL EXAMINATION:  Constitutional:       Appearance: Healthy appearance. Not in distress.   Neck:      Vascular: No JVR. JVD normal.   Pulmonary:      Effort: Pulmonary effort is normal.      Breath sounds: Normal breath sounds. No wheezing. No rhonchi. No rales.   Chest:      Chest wall: Not tender to palpatation.   Cardiovascular:      PMI at left midclavicular line. Normal rate. Regular rhythm. Normal S1. Normal S2.       Murmurs: There is no murmur.      No gallop.  No click. No rub.   Pulses:     Intact distal pulses.   Edema:     Peripheral edema absent.   Abdominal:      General: Bowel sounds are normal.      Palpations: Abdomen is soft.      Tenderness: There is no abdominal tenderness.   Musculoskeletal: Normal range of motion.         General: No tenderness. Skin:     General: Skin is warm and dry.   Neurological:      General: No focal deficit present.      Mental Status: Alert and oriented to person, place and time.         Diagnostic Data:    ECG 12 Lead    Date/Time: 11/10/2023 1:16 PM  Performed by: Pawel Leahy MD    Authorized by: Pawel Leahy MD  Comparison: compared with previous ECG from 10/17/2023  Similar to previous ECG  Rhythm: sinus rhythm  Ectopy: atrial premature contractions  BPM: 93        Lab Results   Component Value Date    TRIG 87 10/05/2023    HDL 80 (H) 10/05/2023     Lab " Results   Component Value Date    GLUCOSE 107 (H) 11/09/2023    BUN 9 11/09/2023    CREATININE 0.84 11/09/2023     11/09/2023    K 4.0 11/09/2023     11/09/2023    CO2 24.6 11/09/2023     Lab Results   Component Value Date    HGBA1C 5.20 05/31/2023     Lab Results   Component Value Date    WBC 3.59 11/09/2023    HGB 17.4 11/09/2023    HCT 48.5 11/09/2023     11/09/2023         Advance Care Planning            ASSESSMENT:   Diagnosis Plan   1. Paroxysmal SVT (supraventricular tachycardia)        2. Primary hypertension        3. Mixed hyperlipidemia            PLAN:  Hypertensive cardiomyopathy patient with excessive alcohol intake.  Discussed with patient and his wife in the office today.  He is on cardioselective beta-blockade at low-dose at current.  Would document 7-day E patch to delineate candidacy for other medical management.  If indeed he is having avoidable SVT this would be a consideration however given his alcohol intake this would likely not be effective.    Hypertension controlled on current spironolactone metoprolol    Mixed dyslipidemia patient states he discontinued statin therapy.  He has elevated HDL consistent with his alcoholism        No ref. provider found, thank you for referring Mr. Staley for evaluation.  I have forwarded my electronically generated recommendations to you for review.  Please do not hesitate to call with any questions.      Pawel Leahy MD, [facility]

## 2023-11-09 NOTE — PROGRESS NOTES
Follow Up      Patient Name: Bala Staley III  : 1960   MRN: [id number]     Chief Complaint:  No chief complaint on file.      History of Present Illness: Bala Staley III is a 63 y.o. male who is here today for follow up on fatty liver. Wife is with patient for visit today.    Pt reports issues in the last few months with palpitations, tremors, SOA. He has had extensive outpatient cardiac workup and is scheduled to see Dr. Leahy, cardiologist, tomorrow.     He discontinued use of atorvastatin about 4-6 weeks ago. He continues to drink 2-3 bourbon drinks per day. Patient denies associated fever, chills, abdominal pain, indigestion, nausea, vomiting, diarrhea, constipation, hematemesis, dysphagia, hematochezia, melena, bloating, weight loss or gain, dysuria, jaundice or bruising. GERD continues to be well controlled with esomeprazole 40mg daily.     Labs 10/2023: total bili 1.0, , , alk phos 122, Hb 17.6, .9, MCH 36.4, plt 167.     Liver US 2023: Diffusely increased echogenicity of the liver parenchyma suggesting steatosis.     Subjective      Review of Systems:   Review of Systems   Constitutional:  Negative for appetite change, chills, diaphoresis, fatigue, fever, unexpected weight gain and unexpected weight loss.   HENT:  Negative for drooling, facial swelling, mouth sores, nosebleeds, rhinorrhea, sore throat, swollen glands, tinnitus and trouble swallowing.    Eyes: Negative.    Respiratory:  Positive for shortness of breath. Negative for choking and chest tightness.    Cardiovascular:  Positive for palpitations.   Gastrointestinal:  Negative for abdominal pain, anal bleeding, blood in stool, constipation, diarrhea, nausea, vomiting, GERD and indigestion.   Endocrine: Negative.    Genitourinary:  Negative for dysuria, flank pain and hematuria.   Musculoskeletal:  Negative for arthralgias, myalgias and neck pain.   Skin:  Negative for color change, dry skin, pallor and  bruise.   Neurological:  Negative for dizziness, tremors, syncope, weakness and numbness.   Psychiatric/Behavioral:  Negative for decreased concentration, hallucinations and self-injury. The patient is not nervous/anxious.    All other systems reviewed and are negative.      Medications:     Current Outpatient Medications:     allopurinol (ZYLOPRIM) 100 MG tablet, TAKE 1 TABLET BY MOUTH EVERY DAY, Disp: 90 tablet, Rfl: 1    amitriptyline (ELAVIL) 10 MG tablet, Take 1 tablet by mouth Every Night., Disp: 30 tablet, Rfl: 11    ASCORBIC ACID PO, Take 1 tablet by mouth Daily., Disp: , Rfl:     aspirin 81 MG EC tablet, Take 1 tablet by mouth Daily., Disp: 30 tablet, Rfl: 11    atorvastatin (LIPITOR) 40 MG tablet, TAKE 1 TABLET BY MOUTH EVERY DAY, Disp: 90 tablet, Rfl: 1    cetirizine (zyrTEC) 5 MG tablet, Take 1 tablet by mouth Daily., Disp: , Rfl:     colchicine 0.6 MG tablet, TAKE 1 TABLET BY MOUTH EVERY DAY, Disp: 30 tablet, Rfl: 1    esomeprazole (NexIUM) 40 MG packet, Take 1 capsule by mouth Every Night., Disp: , Rfl:     ferrous sulfate 325 (65 FE) MG EC tablet, Take 1 tablet by mouth Daily With Breakfast., Disp: 90 tablet, Rfl: 1    furosemide (LASIX) 20 MG tablet, TAKE 1 TABLET BY MOUTH TWICE A DAY (Patient taking differently: Take 1 tablet by mouth 2 (Two) Times a Day.), Disp: 180 tablet, Rfl: 1    MAGNESIUM OXIDE PO, Take 250 mg by mouth Daily., Disp: , Rfl:     metoprolol succinate XL (TOPROL-XL) 25 MG 24 hr tablet, Take 1 tablet by mouth Daily., Disp: 30 tablet, Rfl: 1    potassium chloride (KLOR-CON) 20 MEQ CR tablet, Take 1 tablet by mouth Daily., Disp: 90 tablet, Rfl: 1    spironolactone (ALDACTONE) 25 MG tablet, Take 1 tablet by mouth Daily., Disp: , Rfl:     vitamin D (ERGOCALCIFEROL) 1.25 MG (63402 UT) capsule capsule, Take 1 capsule by mouth Every 30 (Thirty) Days., Disp: , Rfl:     Allergies:   Allergies   Allergen Reactions    Contrast Dye (Echo Or Unknown Ct/Mr) Rash       Social History:   Social  History     Socioeconomic History    Marital status:    Tobacco Use    Smoking status: Never     Passive exposure: Never    Smokeless tobacco: Former     Types: Chew     Quit date: 2002   Vaping Use    Vaping Use: Never used   Substance and Sexual Activity    Alcohol use: Yes     Alcohol/week: 14.0 standard drinks of alcohol     Types: 14 Shots of liquor per week     Comment: per day, [place]     Drug use: No    Sexual activity: Yes     Partners: Female     Birth control/protection: None        Surgical History:   Past Surgical History:   Procedure Laterality Date    APPENDECTOMY      CARDIAC CATHETERIZATION  2011    COLONOSCOPY      ELBOW ARTHROPLASTY      Right     ELBOW PROCEDURE  2003    ENDOSCOPY  2011    ENDOSCOPY  2008    with biopsy    ESOPHAGEAL MOTILITY STUDY N/A 06/27/2019    Procedure: MANOMETRY;  Surgeon: Mark Lowery MD;  Location: [facility] ENDOSCOPY;  Service: Gastroenterology    FOOT SURGERY      left foot     HAND SURGERY  2022    UPPER GASTROINTESTINAL ENDOSCOPY      WRIST SURGERY  2022        Medical History:   Past Medical History:   Diagnosis Date    Anemia     iron deficiency    Arthritis of back 2003    Chronic low back pain 03/14/2018    Colonic polyp     Difficulty walking 2022    ballence issue    Difficulty walking     Dizziness     ED (erectile dysfunction)     Environmental allergies     Esophagitis     Essential hypertension 03/14/2018    Gastroesophageal reflux disease without esophagitis 03/14/2018    Gout     Hernia of abdominal cavity     Hip arthrosis 2003    HL (hearing loss) 2010    left ear    Hyperlipidemia     Hypertension     Low back strain     off and on    Periarthritis of shoulder 2020    Reactive depression 03/14/2018    Rotator cuff syndrome 2020    Tennis elbow     Vitamin D deficiency 03/14/2018    Weakness         Objective     Physical Exam:  Vital Signs: There were no vitals filed for this visit.  There is no height or weight on file to calculate BMI.      Physical Exam  Vitals and nursing note reviewed.   Constitutional:       General: He is not in acute distress.     Appearance: Normal appearance. He is not diaphoretic.      Comments: BMI 30.83   HENT:      Head: Normocephalic and atraumatic.      Right Ear: External ear normal.      Left Ear: External ear normal.      Nose: Nose normal.      Mouth/Throat:      Mouth: Mucous membranes are moist.      Pharynx: Oropharynx is clear.   Eyes:      Conjunctiva/sclera: Conjunctivae normal.      Pupils: Pupils are equal, round, and reactive to light.   Cardiovascular:      Rate and Rhythm: Normal rate and regular rhythm.      Pulses: Normal pulses.      Heart sounds: Normal heart sounds.   Pulmonary:      Effort: Pulmonary effort is normal.      Breath sounds: Normal breath sounds.   Abdominal:      General: Abdomen is flat. There is no distension.      Tenderness: There is no abdominal tenderness. There is no guarding or rebound.   Musculoskeletal:         General: Normal range of motion.      Cervical back: Normal range of motion.      Right lower leg: No edema.      Left lower leg: No edema.   Skin:     General: Skin is warm and dry.      Coloration: Skin is not jaundiced or pale.      Comments: Telangectasias noted to nose and bilateral cheeks   Neurological:      General: No focal deficit present.      Mental Status: He is alert and oriented to person, place, and time. Mental status is at baseline.      Motor: Tremor (bilateral hands) present.   Psychiatric:         Mood and Affect: Mood normal.         Assessment / Plan      Assessment/Plan:   There are no diagnoses linked to this encounter.     Fatty liver  Elevated liver enzymes  GERD  Ongoing EtOH use   - continue esomeprazole 40mg daily    - pt given GERD / Mediterranean diet instructions, advised to avoid GI irritants such as caffeine, carbonation, EtOH, tobacco, chocolate, peppermint, acid-based foods   - again encouraged patient to significant limit / stop  EtOH consumption, due to suspicion of his consumption of such fueling ongoing elevated LFTs   - obtain CBC, CMP, PT/INR   - liver US reviewed   - follow up in clinic in 6mo, or after completion of above studies   - call clinic at any time for questions or new / worsened sx    Follow Up:   Return in about 6 months (around 5/9/2024).    Plan of care reviewed with the patient at the conclusion of today's visit.  Education was provided regarding diagnosis, management, and any prescribed or recommended OTC medications.  Patient verbalized understanding of and agreement with management plan.     NOTE TO PATIENT: The 21st Century Cures Act makes medical notes like these available to patients in the interest of transparency. However, be advised this is a medical document. It is intended as peer to peer communication. It is written in medical language and may contain abbreviations or verbiage that are unfamiliar. It may appear blunt or direct. Medical documents are intended to carry relevant information, facts as evident, and the clinical opinion of the practitioner.     Time Statement:   Discussed plan of care in detail with patient today. Patient verbally understands and agrees. I have spent 30 minutes reviewing available diagnostics, obtaining history, examining the patient, developing a treatment plan, and educating the patient on disease process and plan of care.     Alba Gomes PA-C   [facility]

## 2023-11-10 ENCOUNTER — OFFICE VISIT (OUTPATIENT)
Dept: CARDIOLOGY | Facility: CLINIC | Age: 63
End: 2023-11-10
Payer: COMMERCIAL

## 2023-11-10 VITALS
HEIGHT: 72 IN | DIASTOLIC BLOOD PRESSURE: 78 MMHG | HEART RATE: 93 BPM | WEIGHT: 229.8 LBS | OXYGEN SATURATION: 97 % | SYSTOLIC BLOOD PRESSURE: 118 MMHG | BODY MASS INDEX: 31.13 KG/M2

## 2023-11-10 DIAGNOSIS — I10 ESSENTIAL HYPERTENSION: ICD-10-CM

## 2023-11-10 DIAGNOSIS — I10 PRIMARY HYPERTENSION: ICD-10-CM

## 2023-11-10 DIAGNOSIS — I47.10 PAROXYSMAL SVT (SUPRAVENTRICULAR TACHYCARDIA): Primary | ICD-10-CM

## 2023-11-10 DIAGNOSIS — E78.2 MIXED HYPERLIPIDEMIA: ICD-10-CM

## 2023-11-10 DIAGNOSIS — I47.10 PAROXYSMAL SVT (SUPRAVENTRICULAR TACHYCARDIA): ICD-10-CM

## 2023-11-10 DIAGNOSIS — R06.09 DYSPNEA ON EXERTION: ICD-10-CM

## 2023-11-10 NOTE — PROGRESS NOTES
Please let Mueller know that his CBC reveals elevated MCV (size of RBC) and CMP reveals significantly elevated AST / ALT, despite holding statin medication x 1 month. This pattern of elevation is consistent with ongoing and significant EtOH consumption. Recommend cessation of such and repeat labs in 3mo.

## 2023-11-13 ENCOUNTER — TREATMENT (OUTPATIENT)
Dept: PHYSICAL THERAPY | Facility: CLINIC | Age: 63
End: 2023-11-13
Payer: COMMERCIAL

## 2023-11-13 DIAGNOSIS — R26.89 IMPAIRMENT OF BALANCE: Primary | ICD-10-CM

## 2023-11-13 PROCEDURE — 97112 NEUROMUSCULAR REEDUCATION: CPT | Performed by: PHYSICAL THERAPIST

## 2023-11-13 PROCEDURE — 97110 THERAPEUTIC EXERCISES: CPT | Performed by: PHYSICAL THERAPIST

## 2023-11-13 NOTE — PROGRESS NOTES
PT Progress Note  Visit: 4  Date of Initial Visit: Type: THERAPY  Noted: 10/16/2023  [facility] [place]  [address]    Patient: Bala Staley III   : 1960  Diagnosis/ICD-10 Code:  Impairment of balance [R26.89]  Referring practitioner: NORTH Garcia*  Date of Initial Visit: Type: THERAPY  Noted: 10/16/2023  Today's Date: 2023  Patient seen for 4 sessions    Subjective:   Patient reports: he is feeling ok. He states doctors are now thinking he has a-fib.  Pain: 0/10  Clinical Progress: improved  Home Program Compliance: Yes  Treatment has included: therapeutic exercise and neuromuscular re-education    Objective   See Exercise, Manual, and Modality Logs for complete treatment.    PT Neuro         Assessment & Plan       Assessment  Impairments: abnormal coordination, abnormal gait, activity intolerance, impaired balance, impaired physical strength and safety issue   Functional limitations: carrying objects, walking, standing and stooping   Assessment details: Patient continues to demonstrate difficulty maintaining balance with narrow stance and compromised balance situations. Patient compliant with HEP and is continually placing himself in positions throughout the day. He is actively seeing cardiology to work on finding the answer to his symptoms. He will continue to be progressed as indicated.   Prognosis: fair    Goals  Plan Goals: STG (3 visits)  1. Patient will report compliance with initial HEP. MET  2. Patient to improve GAMBINO balance score to >/= 53 /56 to decrease patient's risk of falls. ONGOING  3. Patient to improve FGA score to >/= 26 /30 to decrease patient's risk of falls and improve community ambulation.ONGOING    LTG (6 visits)  1. Patient will be I with final HEP. ONGOING  2. Patient to improve GAMBINO balance score to >/= 56 /56 to decrease patient's risk of falls.ONGOING  3. Patient to improve FGA score to >/= 28/30 to decrease patient's risk of  falls and improve community ambulation.ONGOING        Plan  Therapy options: will be seen for skilled therapy services  Planned modality interventions: TENS  Planned therapy interventions: ADL retraining, balance/weight-bearing training, flexibility, gait training, manual therapy, neuromuscular re-education, motor coordination training, postural training, strengthening, stretching, therapeutic activities, transfer training and home exercise program  Frequency: 1x week  Duration in visits: 4  Treatment plan discussed with: patient  Plan details: Patient will be seen 1x/wk x 4 wks with treatment to include strengthening, stretching, manual therapy, neuromuscular re-education, balance, gait and endurance training.           Visit Diagnoses:    ICD-10-CM ICD-9-CM   1. Impairment of balance  R26.89 781.2       Progress toward previous goals: Partially Met      Recommendations: Continue as planned  Timeframe: 1 month    PT Signature: Luli Sanabria, PT, DPT, MSCS, CDP, CSRS  [place] License #: [id number]    Based upon review of the patient's progress and continued therapy plan, it is my medical opinion that Bala Staley should continue physical therapy treatment at [facility] PHYSICAL THERAPY  [facility] [address] [facility] [id number] [phone].    Signature: __________________________________  Sophia Amos APRN    Timed:  Manual Therapy:    0     mins  36281;  Therapeutic Exercise:    10     mins  70670;     Neuromuscular Sanjuana:    28    mins  77769;    Therapeutic Activity:     0     mins  95126;     Gait Trainin     mins  04033;     Electrical Stimulation:    0     mins  88812 ( );    Untimed:  Canalith Repositioning   0 mins  15472    Timed Treatment:   38   mins   Total Treatment:     38   mins

## 2023-11-24 DIAGNOSIS — M10.9 ACUTE GOUT INVOLVING TOE OF LEFT FOOT, UNSPECIFIED CAUSE: ICD-10-CM

## 2023-11-27 RX ORDER — COLCHICINE 0.6 MG/1
TABLET ORAL
Qty: 30 TABLET | Refills: 1 | Status: SHIPPED | OUTPATIENT
Start: 2023-11-27

## 2023-12-08 ENCOUNTER — TREATMENT (OUTPATIENT)
Dept: PHYSICAL THERAPY | Facility: CLINIC | Age: 63
End: 2023-12-08
Payer: COMMERCIAL

## 2023-12-08 DIAGNOSIS — R26.89 IMPAIRMENT OF BALANCE: Primary | ICD-10-CM

## 2023-12-08 PROCEDURE — 97112 NEUROMUSCULAR REEDUCATION: CPT | Performed by: PHYSICAL THERAPIST

## 2023-12-08 PROCEDURE — 97110 THERAPEUTIC EXERCISES: CPT | Performed by: PHYSICAL THERAPIST

## 2023-12-08 RX ORDER — AMITRIPTYLINE HYDROCHLORIDE 10 MG/1
10 TABLET, FILM COATED ORAL NIGHTLY
Qty: 90 TABLET | Refills: 3 | Status: SHIPPED | OUTPATIENT
Start: 2023-12-08 | End: 2024-12-07

## 2023-12-08 NOTE — PROGRESS NOTES
Physical Therapy Progress Note  Visit: 5  Date of Initial Visit: Type: THERAPY  Noted: 10/16/2023    Patient: Bala Staley III   : 1960  Diagnosis/ICD-10 Code:  Impairment of balance [R26.89]  Referring practitioner: NORTH Garcia*  Date of Initial Visit: Type: THERAPY  Noted: 10/16/2023  Today's Date: 2023  Patient seen for 5 sessions    Subjective:   Patient reports: he is feeling ok. Goes back to heart doctor on January.   Pain: 0/10  Clinical Progress: improved  Home Program Compliance: Yes  Treatment has included: therapeutic exercise and neuromuscular re-education    Objective   See Exercise, Manual, and Modality Logs for complete treatment.    PT Neuro  GAMBINO:56  FGA:         Assessment & Plan       Assessment  Impairments: abnormal coordination, abnormal gait, activity intolerance, impaired balance, impaired physical strength and safety issue   Functional limitations: carrying objects, walking, standing and stooping   Assessment details: Patient demonstrates improved performance of static and dynamic balance as seen with improvements in FGA and GAMBINO balance testing. He will continue to work towards static standing on his L foot at home and propping R foot due to inability to stand on L side while showering or placing shoes on. Patient would benefit from ongoing skilled PT intervention to address aforementioned deficits.   Prognosis: fair    Goals  Plan Goals: STG (3 visits)  1. Patient will report compliance with initial HEP. MET  2. Patient to improve GAMBINO balance score to >/= 53 /56 to decrease patient's risk of falls. MET  3. Patient to improve FGA score to >/= 26 /30 to decrease patient's risk of falls and improve community ambulation. MET    LTG (6 visits)  1. Patient will be I with final HEP. ONGOING  2. Patient to improve GAMBINO balance score to >/= 56 /56 to decrease patient's risk of falls. ONGOING  3. Patient to improve FGA score to >/= 28/30 to decrease patient's risk  of falls and improve community ambulation. ONGOING        Plan  Therapy options: will be seen for skilled therapy services  Planned modality interventions: TENS  Planned therapy interventions: ADL retraining, balance/weight-bearing training, flexibility, gait training, manual therapy, neuromuscular re-education, motor coordination training, postural training, strengthening, stretching, therapeutic activities, transfer training and home exercise program  Frequency: 1x week  Duration in visits: 4  Treatment plan discussed with: patient  Plan details: Patient to continue with PT services to improve gait, balance, strength, transfers and overall functional mobility for 4 more visits.           Timed:  Manual Therapy:            0     mins  34256;  Therapeutic Exercise:    8    mins  43895;     Neuromuscular Sanjuana:    30    mins  96803;    Therapeutic Activity:      0     mins  56337;     Gait Trainin    mins  88031;     Electrical Stimulation:    0    mins  66572 ( );     Untimed:  Canalith Repositioning techniques _0_ [id number]      Timed Treatment:   38   mins   Total Treatment:     38   mins      Luli Sanabria, PT, DPT, MSCS, CDP, CSRS  [place] License #: [id number]  Physical Therapist

## 2023-12-12 RX ORDER — METOPROLOL SUCCINATE 25 MG/1
25 TABLET, EXTENDED RELEASE ORAL DAILY
Qty: 90 TABLET | Refills: 1 | Status: SHIPPED | OUTPATIENT
Start: 2023-12-12

## 2023-12-15 ENCOUNTER — TREATMENT (OUTPATIENT)
Dept: PHYSICAL THERAPY | Facility: CLINIC | Age: 63
End: 2023-12-15
Payer: COMMERCIAL

## 2023-12-15 DIAGNOSIS — R26.89 IMPAIRMENT OF BALANCE: Primary | ICD-10-CM

## 2023-12-15 PROCEDURE — 97110 THERAPEUTIC EXERCISES: CPT | Performed by: PHYSICAL THERAPIST

## 2023-12-15 PROCEDURE — 97112 NEUROMUSCULAR REEDUCATION: CPT | Performed by: PHYSICAL THERAPIST

## 2023-12-15 NOTE — PROGRESS NOTES
Physical Therapy Daily Note  Visit: 6  Date of Initial Visit: Type: THERAPY  Noted: 10/16/2023    Patient: Bala Staley III   : 1960  Diagnosis/ICD-10 Code:  Impairment of balance [R26.89]  Referring practitioner: NORTH Garcia*  Date of Initial Visit: Type: THERAPY  Noted: 10/16/2023  Today's Date: 12/15/2023  Patient seen for 6 sessions    Subjective:   Patient reports: his balance continues to wax and wane.   Pain: 0/10  Clinical Progress: improved  Home Program Compliance: Yes  Treatment has included: therapeutic exercise and neuromuscular re-education    Objective   See Exercise, Manual, and Modality Logs for complete treatment.    PT Neuro          Assessment & Plan       Assessment  Assessment details: Patient demonstrates improved L single leg stance, however increased movement or unstable surface causes LOB. He will continue to add movement of the right or trunk while standing on SL. He also will attempt unstable surface standing such as grass.     Plan  Plan details: Patient to continue with PT services to improve gait, balance, strength, transfers and overall functional mobility.          Timed:  Manual Therapy:            0     mins  93765;  Therapeutic Exercise:    8    mins  17760;     Neuromuscular Sanjuana:    30    mins  32240;    Therapeutic Activity:      0     mins  89470;     Gait Trainin    mins  45204;     Electrical Stimulation:    0    mins  72274 ( );     Untimed:  Canalith Repositioning techniques _0_ [id number]      Timed Treatment:   38   mins   Total Treatment:     38   mins      Luli Sanabria, PT, DPT, MSCS, CDP, CSRS  [place] License #: [id number]  Physical Therapist

## 2023-12-18 RX ORDER — LOSARTAN POTASSIUM 25 MG/1
TABLET ORAL
Qty: 90 TABLET | Refills: 1 | OUTPATIENT
Start: 2023-12-18

## 2023-12-18 NOTE — TELEPHONE ENCOUNTER
Rx Refill Note  Requested Prescriptions     Pending Prescriptions Disp Refills    losartan (COZAAR) 25 MG tablet [Pharmacy Med Name: LOSARTAN POTASSIUM 25 MG TAB] 90 tablet 1     Sig: TAKE 1 TABLET BY MOUTH EVERY DAY      Last office visit with prescribing clinician: 10/4/2023   Last telemedicine visit with prescribing clinician: Visit date not found   Next office visit with prescribing clinician: 4/11/2024                         Would you like a call back once the refill request has been completed: [] Yes [] No    If the office needs to give you a call back, can they leave a voicemail: [] Yes [] No    Alba Lazaro LPN  12/18/23, 10:15 EST

## 2023-12-19 ENCOUNTER — OFFICE VISIT (OUTPATIENT)
Dept: NEUROLOGY | Facility: CLINIC | Age: 63
End: 2023-12-19
Payer: COMMERCIAL

## 2023-12-19 VITALS
BODY MASS INDEX: 30.96 KG/M2 | HEART RATE: 92 BPM | DIASTOLIC BLOOD PRESSURE: 76 MMHG | OXYGEN SATURATION: 96 % | SYSTOLIC BLOOD PRESSURE: 122 MMHG | HEIGHT: 72 IN | WEIGHT: 228.6 LBS

## 2023-12-19 DIAGNOSIS — R25.1 TREMOR: Primary | ICD-10-CM

## 2023-12-19 DIAGNOSIS — R26.89 BALANCE PROBLEM: ICD-10-CM

## 2023-12-19 PROCEDURE — 99214 OFFICE O/P EST MOD 30 MIN: CPT | Performed by: NURSE PRACTITIONER

## 2023-12-19 NOTE — PROGRESS NOTES
Neuro Office Visit      Encounter Date: 2023   Patient Name: Bala Staley III  : 1960   MRN: [id number]   PCP:  Wilfred Kim MD     Chief Complaint:    Chief Complaint   Patient presents with    Dyspnea on exertion     F/U       History of Present Illness: Bala Staley III is a 63 y.o. male who is here today in Neurology for tremor.    Last visit with me on 2023 with DaTscan ordered, Elavil started, referral to [facility] heart and valve [facility].    Tremor:  DaTscan negative.    Started on metoprolol by cardiology and feels as though the tremor is a little better since that time.    Tremor comes and goes.    Occasional difficulty with using utensils or cups.    Weakness:  Physical therapy has helped to improve weakness.    Gait imbalance:  Balance has improved with physical therapy.    NM Brain Scan w SPECT/CT (10/03/2023 14:18)     Subjective      Past Medical History:   Past Medical History:   Diagnosis Date    Anemia     iron deficiency    Arthritis of back     Chronic low back pain 2018    Colonic polyp     Difficulty walking     ballence issue    Difficulty walking     Dizziness     ED (erectile dysfunction)     Environmental allergies     Esophagitis     Essential hypertension 2018    Gastroesophageal reflux disease without esophagitis 2018    Gout     Hernia of abdominal cavity     Hip arthrosis     HL (hearing loss)     left ear    Hyperlipidemia     Hypertension     Low back strain     off and on    Periarthritis of shoulder     Reactive depression 2018    Rotator cuff syndrome     Tennis elbow     Vasovagal syncope 2019    Vitamin D deficiency 2018    Weakness        Past Surgical History:   Past Surgical History:   Procedure Laterality Date    APPENDECTOMY      CARDIAC CATHETERIZATION      COLONOSCOPY      ELBOW ARTHROPLASTY      Right     ELBOW PROCEDURE      ENDOSCOPY      ENDOSCOPY      with  biopsy    ESOPHAGEAL MOTILITY STUDY N/A 06/27/2019    Procedure: MANOMETRY;  Surgeon: Mark Lowery MD;  [facility];  Service: Gastroenterology    FOOT SURGERY      left foot     HAND SURGERY  2022    UPPER GASTROINTESTINAL ENDOSCOPY      WRIST SURGERY  2022       Family History:   Family History   Problem Relation Age of Onset    Alzheimer's disease Mother     Dementia Mother     Hypertension Mother     High cholesterol Mother     Colon polyps Father     Heart disease Father     Heart attack Father     Colon cancer Father     Coronary artery disease Father     Lung disease Sister     No Known Problems Maternal Grandmother     Alzheimer's disease Maternal Grandfather     Cancer Paternal Grandmother     Lung cancer Paternal Grandmother     Heart disease Paternal Grandfather     Heart disease Son     Esophageal cancer Neg Hx        Social History:   Social History     Socioeconomic History    Marital status:    Tobacco Use    Smoking status: Never     Passive exposure: Never    Smokeless tobacco: Former     Types: Chew     Quit date: 2002   Vaping Use    Vaping Use: Never used   Substance and Sexual Activity    Alcohol use: Yes     Alcohol/week: 14.0 standard drinks of alcohol     Types: 14 Shots of liquor per week     Comment: per day, [place]     Drug use: No    Sexual activity: Yes     Partners: Female     Birth control/protection: None       Medications:     Current Outpatient Medications:     allopurinol (ZYLOPRIM) 100 MG tablet, TAKE 1 TABLET BY MOUTH EVERY DAY, Disp: 90 tablet, Rfl: 1    amitriptyline (ELAVIL) 10 MG tablet, Take 1 tablet by mouth Every Night., Disp: 90 tablet, Rfl: 3    ASCORBIC ACID PO, Take 1 tablet by mouth Daily., Disp: , Rfl:     aspirin 81 MG EC tablet, Take 1 tablet by mouth Daily., Disp: 30 tablet, Rfl: 11    cetirizine (zyrTEC) 5 MG tablet, Take 1 tablet by mouth Daily., Disp: , Rfl:     colchicine 0.6 MG tablet, TAKE 1 TABLET BY MOUTH EVERY DAY, Disp: 30  tablet, Rfl: 1    esomeprazole (NexIUM) 40 MG packet, Take 1 capsule by mouth Every Night., Disp: , Rfl:     ferrous sulfate 325 (65 FE) MG EC tablet, Take 1 tablet by mouth Daily With Breakfast., Disp: 90 tablet, Rfl: 1    furosemide (LASIX) 20 MG tablet, TAKE 1 TABLET BY MOUTH TWICE A DAY (Patient taking differently: Take 1 tablet by mouth 2 (Two) Times a Day.), Disp: 180 tablet, Rfl: 1    MAGNESIUM OXIDE PO, Take 250 mg by mouth Daily., Disp: , Rfl:     metoprolol succinate XL (TOPROL-XL) 25 MG 24 hr tablet, TAKE 1 TABLET BY MOUTH EVERY DAY, Disp: 90 tablet, Rfl: 1    potassium chloride (KLOR-CON) 20 MEQ CR tablet, Take 1 tablet by mouth Daily., Disp: 90 tablet, Rfl: 1    spironolactone (ALDACTONE) 25 MG tablet, Take 1 tablet by mouth Daily., Disp: , Rfl:     vitamin D (ERGOCALCIFEROL) 1.25 MG (73862 UT) capsule capsule, Take 1 capsule by mouth Every 30 (Thirty) Days., Disp: , Rfl:     Allergies:   Allergies   Allergen Reactions    Contrast Dye (Echo Or Unknown Ct/Mr) Rash       PHQ-9 Total Score:     STEADI Fall Risk Assessment has not been completed.    Objective     Physical Exam:   Physical Exam  Vitals reviewed.   Neurological:      Mental Status: He is oriented to person, place, and time.      Cranial Nerves: Cranial nerves 2-12 are intact.      Gait: Gait is intact.   Psychiatric:         Speech: Speech normal.         Neurologic Exam     Mental Status   Oriented to person, place, and time.   Attention: normal. Concentration: normal.   Speech: speech is normal   Level of consciousness: alert  Knowledge: good.     Cranial Nerves   Cranial nerves II through XII intact.     Motor Exam     Strength   Right neck flexion: 5/5  Left neck flexion: 5/5  Right neck extension: 5/5  Left neck extension: 5/5  Right deltoid: 5/5  Left deltoid: 5/5  Right biceps: 5/5  Left biceps: 5/5  Right triceps: 5/5  Left triceps: 5/5  Right wrist flexion: 5/5  Left wrist flexion: 5/5  Right wrist extension: 5/5  Left wrist  "extension: 5/5  Right interossei: 5/5  Left interossei: 5/5  Right abdominals: 5/5  Left abdominals: 5/5  Right iliopsoas: 5/5  Left iliopsoas: 5/5  Right quadriceps: 5/5  Left quadriceps: 5/5  Right hamstrin/5  Left hamstrin/5  Right glutei: 5/5  Left glutei: 5/5  Right anterior tibial: 5/5  Left anterior tibial: 5/5  Right posterior tibial: 5/5  Left posterior tibial: 5/5  Right peroneal: 5/5  Left peroneal: 5/5  Right gastroc: 5/5  Left gastroc: 5/5    Sensory Exam   Light touch normal.     Gait, Coordination, and Reflexes     Gait  Gait: normal       Vital Signs:   Vitals:    23 1320   BP: 122/76   Pulse: 92   SpO2: 96%   Weight: 104 kg (228 lb 9.6 oz)   Height: 182.9 cm (72\")     Body mass index is 31 kg/m².     Results:   Imaging:   XR Chest 1 View    Result Date: 10/9/2023  Impression: No acute cardiopulmonary process. Electronically Signed: Luli Ohara MD  10/9/2023 4:01 PM CDT  Workstation ID: [id number]    NM Brain Scan w SPECT/CT    Result Date: 10/3/2023  Normal pattern, not consistent with Parkinson's disease or other presynaptic parkinsonian condition. CRITICAL RESULT: No. COMMUNICATION: Per this written report. Preliminary report signed by Eldon Suero DO on 10/3/2023 4:59 PM By electronically signing this report, I, the attending physician, attest that I have personally reviewed the images/data for the above examination(s) and agree with the final edited report. Drafted by Eldon Suero DO on 10/3/2023 4:34 PM Final report signed by Judi Lennon MD on 10/3/2023 5:23 PM    MRI Brain With & Without Contrast    Result Date: 2023  No intracranial hemorrhage. No acute infarction. Involutional and ischemic gliotic changes. Fluid in the right mastoid air cells. Electronically Signed: Lavelle Vora MD  2023 7:39 PM EDT  Workstation ID: [id number]       Labs:    No results found for: \"CMP\", \"PROTEIN\", \"ANTIMOGAB\", \"IKIUUL3BZZA\", \"JCVRESULT\", \"QUANTTBGOLD\", \"CBCDIF\", " "\"IGGALBSER\"     Assessment / Plan      Assessment/Plan:   Diagnoses and all orders for this visit:    1. Tremor (Primary)  Comments:  Increased dose of metoprolol if okay with cardiology    2. Balance problem  Comments:  Continue physical therapy           Patient Education:     Reviewed medications, potential side effects and signs and symptoms to report. Discussed risk versus benefits of treatment plan with patient and/or family-including medications, labs and radiology that may be ordered. Addressed questions and concerns during visit. Patient and/or family verbalized understanding and agree with plan. Instructed to call the office with any questions and report to ER with any life-threatening symptoms.     Follow Up:   Return in about 3 months (around 3/19/2024).    I spent 30  minutes face to face with the patient and family. I personally spent 50 percent of this time counseling and discussing diagnosis, diagnostic testing, driving, treatment options, and management .       During this visit the following were done:  Labs Reviewed []    Labs Ordered []    Radiology Reports Reviewed [x]    Radiology Ordered []    PCP Records Reviewed []    Referring Provider Records Reviewed []    ER Records Reviewed []    Hospital Records Reviewed []    History Obtained From Family []    Radiology Images Reviewed []    Other Reviewed [x]    Records Requested []      SY Garcia  [facility] NEUROLOGY  [address]  [facility] [address] [phone]  "

## 2023-12-22 ENCOUNTER — TREATMENT (OUTPATIENT)
Dept: PHYSICAL THERAPY | Facility: CLINIC | Age: 63
End: 2023-12-22
Payer: COMMERCIAL

## 2023-12-22 DIAGNOSIS — R26.89 IMPAIRMENT OF BALANCE: Primary | ICD-10-CM

## 2023-12-22 PROCEDURE — 97110 THERAPEUTIC EXERCISES: CPT | Performed by: PHYSICAL THERAPIST

## 2023-12-22 PROCEDURE — 97112 NEUROMUSCULAR REEDUCATION: CPT | Performed by: PHYSICAL THERAPIST

## 2023-12-22 NOTE — PROGRESS NOTES
PT Progress Note  Visit: 7  Date of Initial Visit: Type: THERAPY  Noted: 10/16/2023  [facility] [place] [facility]  [address]    Patient: Bala Staley III   : 1960  Diagnosis/ICD-10 Code:  Impairment of balance [R26.89]  Referring practitioner: NORTH Garcia*  Date of Initial Visit: Type: THERAPY  Noted: 10/16/2023  Today's Date: 2023  Patient seen for 7 sessions    Subjective:   Patient reports: he continues to have episodes of tremors to the point he has difficulty eating.   Pain: 0/10  Clinical Progress: improved  Home Program Compliance: Yes  Treatment has included: therapeutic exercise and neuromuscular re-education    Objective   See Exercise, Manual, and Modality Logs for complete treatment.    PT Neuro  GAMBINO/56  FGA:        Assessment & Plan       Assessment  Impairments: abnormal coordination, abnormal gait, activity intolerance, impaired balance, impaired physical strength and safety issue   Functional limitations: carrying objects, walking, standing and stooping   Assessment details: Patient is making good progress with his balance. He continues to have episodes where his B UE tremors along with BLE shakiness with LOB. He cannot figure out a rhyme or reasoning behind it. He has cut caffeine and has decreased his alcohol intake. Patient would benefit from continued skilled PT intervention to address ongoing balance deficits that decrease his mobility and function.   Prognosis: fair    Goals  Plan Goals: STG (3 visits)  1. Patient will report compliance with initial HEP. MET  2. Patient to improve GAMBINO balance score to >/= 53 /56 to decrease patient's risk of falls. MET  3. Patient to improve FGA score to >/= 26 /30 to decrease patient's risk of falls and improve community ambulation.MET    LTG (6 visits)  1. Patient will be I with final HEP. ONGOING  2. Patient to improve GAMBINO balance score to >/= 56 /56 to decrease patient's risk of falls.ONGOING  3.  Patient to improve FGA score to >/= 28/30 to decrease patient's risk of falls and improve community ambulation.ONGOING        Plan  Therapy options: will be seen for skilled therapy services  Planned modality interventions: TENS  Planned therapy interventions: ADL retraining, balance/weight-bearing training, flexibility, gait training, manual therapy, neuromuscular re-education, motor coordination training, postural training, strengthening, stretching, therapeutic activities, transfer training and home exercise program  Frequency: 1x week  Duration in visits: 4  Treatment plan discussed with: patient  Plan details: Patient will be seen 1x/wk x 4 wks with treatment to include strengthening, stretching, manual therapy, neuromuscular re-education, balance, gait and endurance training.           Visit Diagnoses:    ICD-10-CM ICD-9-CM   1. Impairment of balance  R26.89 781.2       Progress toward previous goals: Partially Met      Recommendations: Continue as planned  Timeframe: 4 weeks    PT Signature: Luli Sanabria, PT, DPT, MSCS, CDP, CSRS  [place] License #: [id number]    Based upon review of the patient's progress and continued therapy plan, it is my medical opinion that Bala Staley should continue physical therapy treatment at [facility] PHYSICAL THERAPY  [facility] [address] [id number] [phone].    Signature: __________________________________  Sophia Amos APRN    Timed:  Manual Therapy:    0     mins  93982;  Therapeutic Exercise:    8     mins  82214;     Neuromuscular Sanjuana:    30    mins  29131;    Therapeutic Activity:     0     mins  14127;     Gait Trainin     mins  47975;     Electrical Stimulation:    0     mins  41017 ( );    Untimed:  Canalith Repositioning   0 mins  87156    Timed Treatment:   38   mins   Total Treatment:     38   mins

## 2023-12-27 RX ORDER — ATORVASTATIN CALCIUM 40 MG/1
TABLET, FILM COATED ORAL
Qty: 90 TABLET | Refills: 1 | OUTPATIENT
Start: 2023-12-27

## 2023-12-29 ENCOUNTER — TREATMENT (OUTPATIENT)
Dept: PHYSICAL THERAPY | Facility: CLINIC | Age: 63
End: 2023-12-29
Payer: COMMERCIAL

## 2023-12-29 DIAGNOSIS — R26.89 IMPAIRMENT OF BALANCE: Primary | ICD-10-CM

## 2023-12-29 PROCEDURE — 97110 THERAPEUTIC EXERCISES: CPT | Performed by: PHYSICAL THERAPIST

## 2023-12-29 PROCEDURE — 97112 NEUROMUSCULAR REEDUCATION: CPT | Performed by: PHYSICAL THERAPIST

## 2023-12-29 NOTE — PROGRESS NOTES
Physical Therapy Daily Note  Visit: 8  Date of Initial Visit: Type: THERAPY  Noted: 10/16/2023    Patient: Bala Staley III   : 1960  Diagnosis/ICD-10 Code:  Impairment of balance [R26.89]  Referring practitioner: NORTH Garcia*  Date of Initial Visit: Type: THERAPY  Noted: 10/16/2023  Today's Date: 2023  Patient seen for 8 sessions    Subjective:   Patient reports: he is feeling pretty good.   Pain: 0/10  Clinical Progress: improved  Home Program Compliance: Yes  Treatment has included: therapeutic exercise and neuromuscular re-education    Objective   See Exercise, Manual, and Modality Logs for complete treatment.    PT Neuro          Assessment & Plan       Assessment  Assessment details: Patient is able to perform high level balance exercises with little LOB. He does demonstrate challenge with retro stepping with added balance difficulty. Patient will continue to be progressed as indicated.     Plan  Plan details: Patient to continue with PT services to improve gait, balance, strength, transfers and overall functional mobility.          Timed:  Manual Therapy:            0     mins  74640;  Therapeutic Exercise:    8    mins  61417;     Neuromuscular Sanjuana:    30    mins  81257;    Therapeutic Activity:      0     mins  69437;     Gait Trainin    mins  75240;     Electrical Stimulation:    0    mins  44667 ( );     Untimed:  Canalith Repositioning techniques _0_ 97859      Timed Treatment:   38   mins   Total Treatment:     38   mins      Luli Sanabria, PT, DPT, MSCS, CDP, CSRS  [place] License #: [id number]  Physical Therapist

## 2024-01-03 ENCOUNTER — TREATMENT (OUTPATIENT)
Dept: PHYSICAL THERAPY | Facility: CLINIC | Age: 64
End: 2024-01-03
Payer: COMMERCIAL

## 2024-01-03 DIAGNOSIS — R26.89 IMPAIRMENT OF BALANCE: Primary | ICD-10-CM

## 2024-01-03 PROCEDURE — 97112 NEUROMUSCULAR REEDUCATION: CPT | Performed by: PHYSICAL THERAPIST

## 2024-01-03 PROCEDURE — 97110 THERAPEUTIC EXERCISES: CPT | Performed by: PHYSICAL THERAPIST

## 2024-01-03 NOTE — PROGRESS NOTES
Physical Therapy Daily Note  Visit: 9  Date of Initial Visit: Type: THERAPY  Noted: 10/16/2023    Patient: Bala Staley III   : 1960  Diagnosis/ICD-10 Code:  Impairment of balance [R26.89]  Referring practitioner: NORTH Garcia*  Date of Initial Visit: Type: THERAPY  Noted: 10/16/2023  Today's Date: 1/3/2024  Patient seen for 9 sessions    Subjective:   Patient reports: he returns to his cardiologist tomorrow.   Pain: 0/10  Clinical Progress: improved  Home Program Compliance: Yes  Treatment has included: therapeutic exercise and neuromuscular re-education    Objective   See Exercise, Manual, and Modality Logs for complete treatment.    PT Neuro          Assessment & Plan       Assessment  Assessment details: Patient seems to be making global improvement with balance. He is demonstrating less LOB in clinic with exercise and reports less LOB at home with activities such as showering. Patient will continue to be progressed as indicated.     Plan  Plan details: Patient to continue with PT services to improve gait, balance, strength, transfers and overall functional mobility.          Timed:  Manual Therapy:            0     mins  88998;  Therapeutic Exercise:    8    mins  12003;     Neuromuscular Sanjuana:    30    mins  94978;    Therapeutic Activity:      0     mins  62260;     Gait Trainin    mins  25813;     Electrical Stimulation:    0    mins  18157 ( );     Untimed:  Canalith Repositioning techniques _0_ 13408      Timed Treatment:   38   mins   Total Treatment:     38   mins      Luli Sanabria, PT, DPT, MSCS, CDP, CSRS  [place] License #: [id number]  Physical Therapist

## 2024-01-04 ENCOUNTER — OFFICE VISIT (OUTPATIENT)
Dept: CARDIOLOGY | Facility: CLINIC | Age: 64
End: 2024-01-04
Payer: COMMERCIAL

## 2024-01-04 VITALS
WEIGHT: 225.4 LBS | DIASTOLIC BLOOD PRESSURE: 62 MMHG | HEIGHT: 72 IN | OXYGEN SATURATION: 97 % | BODY MASS INDEX: 30.53 KG/M2 | SYSTOLIC BLOOD PRESSURE: 124 MMHG | HEART RATE: 89 BPM

## 2024-01-04 DIAGNOSIS — R00.2 PALPITATIONS: Primary | ICD-10-CM

## 2024-01-04 DIAGNOSIS — I10 PRIMARY HYPERTENSION: ICD-10-CM

## 2024-01-04 PROCEDURE — 99213 OFFICE O/P EST LOW 20 MIN: CPT | Performed by: INTERNAL MEDICINE

## 2024-01-04 RX ORDER — METOPROLOL SUCCINATE 25 MG/1
50 TABLET, EXTENDED RELEASE ORAL DAILY
Qty: 90 TABLET | Refills: 1 | Status: SHIPPED | OUTPATIENT
Start: 2024-01-04

## 2024-01-04 NOTE — PROGRESS NOTES
[facility] Cardiology  Office Progress Note  Bala Gaytans III  1960  [address] [place] [address]       Visit Date: 01/04/24    PCP: Wilfred Kim MD  [address]  [place] [id number]    IDENTIFICATION: A 63 y.o. male   resident of [place], [place].        Problem List:  Dyspnea on Exertion  6/2011 normal Ex/cardiolite EF 66%  10/23 Echo: LVEF 51-55%, mild conc LVH, grade Ia LV DD, ao 4.1 cm, no VHD  10/23 Ex/card 8:11  low risk, normal exercise capacity EF >70%  10/23 BNP normal  Aortic sclerosis     10/23 echo AV not well-visualized (was tricuspid echocardiogram 2018) ascending ao 4.1 cm.  Son with historical Bentall procedure(Sekela)  Palpitations  10/23 with reported high PAC burden and multiple brief SVT episodes - idb  11/23 1 week holter: 61/83/141  406 episodes of PSVT, longest 10 beats, max rate 194, 3.91% SVE   Hypertension  Dyslipidemia  2018 CT chest no cor calcium  10/23 171/87/80/75  A1c 5.2          CC:   Chief Complaint   Patient presents with    Paroxysmal SVT (supraventricular tachycardia)       Allergies  Allergies   Allergen Reactions    Contrast Dye (Echo Or Unknown Ct/Mr) Rash       Current Medications  Current Outpatient Medications   Medication Instructions    allopurinol (ZYLOPRIM) 100 MG tablet TAKE 1 TABLET BY MOUTH EVERY DAY    amitriptyline (ELAVIL) 10 mg, Oral, Nightly    ASCORBIC ACID PO 1 tablet, Oral, Daily    aspirin 81 mg, Oral, Daily    cetirizine (ZYRTEC) 5 mg, Oral, Daily    colchicine 0.6 MG tablet TAKE 1 TABLET BY MOUTH EVERY DAY    esomeprazole (NexIUM) 40 MG packet 1 capsule, Oral, Nightly    ferrous sulfate 325 mg, Oral, Daily With Breakfast    furosemide (LASIX) 20 MG tablet TAKE 1 TABLET BY MOUTH TWICE A DAY    MAGNESIUM OXIDE  mg, Oral, Daily    metoprolol succinate XL (TOPROL-XL) 25 mg, Oral, Daily    potassium chloride (KLOR-CON) 20 MEQ CR tablet 20 mEq, Oral, Daily    spironolactone  "(ALDACTONE) 25 mg, Oral, Daily    vitamin D (ERGOCALCIFEROL) 50,000 Units, Oral, Every 30 Days        History of Present Illness   Bala Staley III is a 63 y.o. year old male here for follow up.  States that his palpitations have improved significantly.  He has attempted to decrease his alcohol intake somewhat.  He has not had a formal sleep evaluation in several years.      OBJECTIVE:  Vitals:    01/04/24 0859   BP: 124/62   BP Location: Left arm   Patient Position: Sitting   Cuff Size: Adult   Pulse: 89   SpO2: 97%   Weight: 102 kg (225 lb 6.4 oz)   Height: 182.9 cm (72\")     Body mass index is 30.57 kg/m².    Constitutional:       Appearance: Healthy appearance. Not in distress.   Neck:      Vascular: No JVR. JVD normal.   Pulmonary:      Effort: Pulmonary effort is normal.      Breath sounds: Normal breath sounds. No wheezing. No rhonchi. No rales.   Chest:      Chest wall: Not tender to palpatation.   Cardiovascular:      PMI at left midclavicular line. Normal rate. Regular rhythm. Normal S1. Normal S2.       Murmurs: There is no murmur.      No gallop.  No click. No rub.   Pulses:     Intact distal pulses.   Edema:     Peripheral edema absent.   Abdominal:      General: Bowel sounds are normal.      Palpations: Abdomen is soft.      Tenderness: There is no abdominal tenderness.   Musculoskeletal: Normal range of motion.         General: No tenderness. Skin:     General: Skin is warm and dry.   Neurological:      General: No focal deficit present.      Mental Status: Alert and oriented to person, place and time.         Diagnostic Data:  Procedures        ASSESSMENT:   Diagnosis Plan   1. Palpitations        2. Primary hypertension            PLAN:  Palpitations will increase metoprolol 50 mg daily at baseline heart rate was in the 80s on his monitor.    Hypertension currently controlled on spironolactone    Questionable sleep apnea formal sleep evaluation with home study should be offered            Pawel YOUNG" MD Tosin, FACC

## 2024-01-10 ENCOUNTER — DOCUMENTATION (OUTPATIENT)
Dept: CARDIOLOGY | Facility: CLINIC | Age: 64
End: 2024-01-10
Payer: COMMERCIAL

## 2024-01-12 ENCOUNTER — TREATMENT (OUTPATIENT)
Dept: PHYSICAL THERAPY | Facility: CLINIC | Age: 64
End: 2024-01-12
Payer: COMMERCIAL

## 2024-01-12 DIAGNOSIS — R26.89 IMPAIRMENT OF BALANCE: Primary | ICD-10-CM

## 2024-01-12 PROCEDURE — 97112 NEUROMUSCULAR REEDUCATION: CPT | Performed by: PHYSICAL THERAPIST

## 2024-01-12 PROCEDURE — 97110 THERAPEUTIC EXERCISES: CPT | Performed by: PHYSICAL THERAPIST

## 2024-01-12 RX ORDER — FUROSEMIDE 20 MG/1
TABLET ORAL
Qty: 180 TABLET | Refills: 1 | Status: SHIPPED | OUTPATIENT
Start: 2024-01-12

## 2024-01-12 NOTE — PROGRESS NOTES
Physical Therapy Daily Note  Visit: 10  Date of Initial Visit: Type: THERAPY  Noted: 10/16/2023    Patient: Bala Staley III   : 1960  Diagnosis/ICD-10 Code:  Impairment of balance [R26.89]  Referring practitioner: NORTH Garcia*  Date of Initial Visit: Type: THERAPY  Noted: 10/16/2023  Today's Date: 2024  Patient seen for 10 sessions    Subjective:   Patient reports: he is feeling ok. Cardiology upped a medication and now he is jumping and walking in his sleep.   Pain: 0/10  Clinical Progress: improved  Home Program Compliance: Yes  Treatment has included: therapeutic exercise and neuromuscular re-education    Objective   See Exercise, Manual, and Modality Logs for complete treatment.    PT Neuro          Assessment & Plan       Assessment  Assessment details: Patient demonstrates good performance of exercises with increased repetition. He Is doing exceedingly better on altered surfaces and is feeling less body tremors. He has one remaining visit.     Plan  Plan details: Patient to continue with PT services to improve gait, balance, strength, transfers and overall functional mobility.          Timed:  Manual Therapy:            0     mins  67062;  Therapeutic Exercise:    8    mins  60831;     Neuromuscular Sanjuana:    30    mins  50417;    Therapeutic Activity:      0     mins  68418;     Gait Trainin    mins  64254;     Electrical Stimulation:    0    mins  71332 ( );     Untimed:  Canalith Repositioning techniques _0_ 56710      Timed Treatment:   38   mins   Total Treatment:     38   mins      Luli Sanabria, PT, DPT, MSCS, CDP, CSRS  [place] License #: [id number]  Physical Therapist

## 2024-01-17 ENCOUNTER — TREATMENT (OUTPATIENT)
Dept: PHYSICAL THERAPY | Facility: CLINIC | Age: 64
End: 2024-01-17
Payer: COMMERCIAL

## 2024-01-17 DIAGNOSIS — R26.89 IMPAIRMENT OF BALANCE: Primary | ICD-10-CM

## 2024-01-17 PROCEDURE — 97112 NEUROMUSCULAR REEDUCATION: CPT | Performed by: PHYSICAL THERAPIST

## 2024-01-17 PROCEDURE — 97110 THERAPEUTIC EXERCISES: CPT | Performed by: PHYSICAL THERAPIST

## 2024-01-17 NOTE — PROGRESS NOTES
Physical Therapy Daily Note/Discharge Summary  Visit: 11  Date of Initial Visit: Type: THERAPY  Noted: 10/16/2023    Patient: Bala Gaytans III   : 1960  Diagnosis/ICD-10 Code:  Impairment of balance [R26.89]  Referring practitioner: NORTH Garcia*  Date of Initial Visit: Type: THERAPY  Noted: 10/16/2023  Today's Date: 2024  Patient seen for 11 sessions    Subjective:   Patient reports: he feels his balance is good.   Pain: 0/10  Clinical Progress: improved  Home Program Compliance: Yes  Treatment has included: therapeutic exercise and neuromuscular re-education    Objective   See Exercise, Manual, and Modality Logs for complete treatment.    PT Neuro          Assessment & Plan       Assessment  Assessment details: Patient demonstrates improved global improvement for balance. He is now able to stand on LLE without the use of UE's. Patient feels he is able to maintain his balance and adhere to his balance HEP. Patient will be discharged at this time to independent management of program.     Plan  Plan details: Patient will be discharged at this time to independent management of program.         Timed:  Manual Therapy:            0     mins  97208;  Therapeutic Exercise:    8    mins  46195;     Neuromuscular Sanjuana:    30    mins  12071;    Therapeutic Activity:      0     mins  72949;     Gait Trainin    mins  30398;     Electrical Stimulation:    0    mins  41071 ( );     Untimed:  Canalith Repositioning techniques _0_ 01108      Timed Treatment:   38   mins   Total Treatment:     38   mins      Luli Sanabria PT, DPT, MSCS, CDP, CSRS  [place] License #: [id number]  Physical Therapist

## 2024-01-22 RX ORDER — ALLOPURINOL 100 MG/1
TABLET ORAL
Qty: 90 TABLET | Refills: 1 | Status: SHIPPED | OUTPATIENT
Start: 2024-01-22

## 2024-01-23 DIAGNOSIS — M10.9 ACUTE GOUT INVOLVING TOE OF LEFT FOOT, UNSPECIFIED CAUSE: ICD-10-CM

## 2024-01-23 RX ORDER — COLCHICINE 0.6 MG/1
TABLET ORAL
Qty: 30 TABLET | Refills: 1 | Status: SHIPPED | OUTPATIENT
Start: 2024-01-23

## 2024-01-23 NOTE — TELEPHONE ENCOUNTER
Rx Refill Note  Requested Prescriptions     Pending Prescriptions Disp Refills    colchicine 0.6 MG tablet 30 tablet 1     Sig: TAKE 1 TABLET BY MOUTH EVERY DAY      Last office visit with prescribing clinician: 10/4/2023   Last telemedicine visit with prescribing clinician: Visit date not found   Next office visit with prescribing clinician: 4/11/2024                         Would you like a call back once the refill request has been completed: [] Yes [] No    If the office needs to give you a call back, can they leave a voicemail: [] Yes [] No    Prashant Grimm MA  01/23/24, 10:26 EST

## 2024-03-06 ENCOUNTER — OFFICE VISIT (OUTPATIENT)
Dept: INTERNAL MEDICINE | Facility: CLINIC | Age: 64
End: 2024-03-06
Payer: COMMERCIAL

## 2024-03-06 VITALS
DIASTOLIC BLOOD PRESSURE: 82 MMHG | BODY MASS INDEX: 30.85 KG/M2 | HEIGHT: 72 IN | HEART RATE: 125 BPM | TEMPERATURE: 97.8 F | SYSTOLIC BLOOD PRESSURE: 148 MMHG | OXYGEN SATURATION: 98 % | WEIGHT: 227.8 LBS

## 2024-03-06 DIAGNOSIS — J01.00 ACUTE NON-RECURRENT MAXILLARY SINUSITIS: Primary | ICD-10-CM

## 2024-03-06 RX ORDER — TRIAMCINOLONE ACETONIDE 40 MG/ML
80 INJECTION, SUSPENSION INTRA-ARTICULAR; INTRAMUSCULAR ONCE
Status: COMPLETED | OUTPATIENT
Start: 2024-03-06 | End: 2024-03-06

## 2024-03-06 RX ORDER — ATORVASTATIN CALCIUM 40 MG/1
1 TABLET, FILM COATED ORAL DAILY
COMMUNITY
Start: 2024-01-20

## 2024-03-06 RX ORDER — LEVOCETIRIZINE DIHYDROCHLORIDE 5 MG/1
5 TABLET, FILM COATED ORAL EVERY EVENING
COMMUNITY

## 2024-03-06 RX ORDER — AZITHROMYCIN 250 MG/1
TABLET, FILM COATED ORAL
Qty: 6 TABLET | Refills: 0 | Status: SHIPPED | OUTPATIENT
Start: 2024-03-06

## 2024-03-06 RX ADMIN — TRIAMCINOLONE ACETONIDE 80 MG: 40 INJECTION, SUSPENSION INTRA-ARTICULAR; INTRAMUSCULAR at 11:43

## 2024-03-06 NOTE — PROGRESS NOTES
[facility]     [facility]  1960   [id number]      Patient Care Team:  Wilfred Kim MD as PCP - General (Internal Medicine)  Pawel Leahy MD as Consulting Physician (Cardiology)    Chief Complaint::   Chief Complaint   Patient presents with    Sore Throat     X12 days    Cough        HPI  The patient is a 63-year-old male presents here with a complaint of cough and sore throat for 12 days.    The patient reports intermittent sore throat and persistent cough, which has been ongoing since last weekend when he visited [place]. He only managed to sleep for 2 hours on Sunday, 03/03/2024, due to severe coughing. Despite driving for 8 hours the following day, the coughing has persisted. He denies experiencing dyspnea or dysphagia. The cough intensifies during the night and is non-productive. He feels the condition is deteriorating and reports occasional postnasal drip. Over-the-counter medication, Tylenol Flu, has been ineffective in alleviating symptoms.    He also mentions that his cholesterol medication appears to be beneficial. He underwent physical therapy for a duration of 3 to 4 weeks, during which he engaged in intensive exercises. He reports that had has no balance.    Chronic Conditions:      Patient Active Problem List   Diagnosis    Essential hypertension    Mixed hyperlipidemia    Class 2 obesity due to excess calories without serious comorbidity in adult    Reactive depression    Gastroesophageal reflux disease without esophagitis    Vitamin D deficiency    Chronic low back pain    Dyspnea on exertion    Syncope, tussive    Hypokalemia    Cough syncope    Allergic rhinitis    Cough    Hypercholesterolemia    Iron deficiency anemia    Vasovagal syncope    Hernia of abdominal cavity    Right sided sciatica    Hepatic steatosis    Acute gout involving toe of left foot    Idiopathic chronic gout of multiple sites without tophus        Past Medical History:   Diagnosis  Date    Anemia     iron deficiency    Arthritis of back 2003    Chronic low back pain 03/14/2018    Colonic polyp     Difficulty walking 2022    ballence issue    Difficulty walking     Dizziness     ED (erectile dysfunction)     Environmental allergies     Esophagitis     Essential hypertension 03/14/2018    Gastroesophageal reflux disease without esophagitis 03/14/2018    Gout     Hernia of abdominal cavity     Hip arthrosis 2003    HL (hearing loss) 2010    left ear    Hyperlipidemia     Hypertension     Low back strain     off and on    Periarthritis of shoulder 2020    Reactive depression 03/14/2018    Rotator cuff syndrome 2020    Tennis elbow     Vasovagal syncope 02/13/2019    Vitamin D deficiency 03/14/2018    Weakness        Past Surgical History:   Procedure Laterality Date    APPENDECTOMY      CARDIAC CATHETERIZATION  2011    COLONOSCOPY      ELBOW ARTHROPLASTY      Right     ELBOW PROCEDURE  2003    ENDOSCOPY  2011    ENDOSCOPY  2008    with biopsy    ESOPHAGEAL MOTILITY STUDY N/A 06/27/2019    Procedure: MANOMETRY;  Surgeon: Mark Lowery MD;  Location: [facility] ENDOSCOPY;  Service: Gastroenterology    FOOT SURGERY      left foot     HAND SURGERY  2022    UPPER GASTROINTESTINAL ENDOSCOPY      WRIST SURGERY  2022       Family History   Problem Relation Age of Onset    Alzheimer's disease Mother     Dementia Mother     Hypertension Mother     High cholesterol Mother     Colon polyps Father     Heart disease Father     Heart attack Father     Colon cancer Father     Coronary artery disease Father     Lung disease Sister     No Known Problems Maternal Grandmother     Alzheimer's disease Maternal Grandfather     Cancer Paternal Grandmother     Lung cancer Paternal Grandmother     Heart disease Paternal Grandfather     Heart disease Son     Esophageal cancer Neg Hx        Social History     Socioeconomic History    Marital status:    Tobacco Use    Smoking status: Never     Passive exposure: Never     Smokeless tobacco: Former     Types: Chew     Quit date: 2002   Vaping Use    Vaping status: Never Used   Substance and Sexual Activity    Alcohol use: Yes     Alcohol/week: 14.0 standard drinks of alcohol     Types: 14 Shots of liquor per week     Comment: per day, [place]     Drug use: No    Sexual activity: Yes     Partners: Female     Birth control/protection: None       Allergies   Allergen Reactions    Contrast Dye (Echo Or Unknown Ct/Mr) Rash         Current Outpatient Medications:     allopurinol (ZYLOPRIM) 100 MG tablet, TAKE 1 TABLET BY MOUTH EVERY DAY, Disp: 90 tablet, Rfl: 1    ASCORBIC ACID PO, Take 1 tablet by mouth Daily., Disp: , Rfl:     aspirin 81 MG EC tablet, Take 1 tablet by mouth Daily., Disp: 30 tablet, Rfl: 11    atorvastatin (LIPITOR) 40 MG tablet, Take 1 tablet by mouth Daily., Disp: , Rfl:     colchicine 0.6 MG tablet, TAKE 1 TABLET BY MOUTH EVERY DAY, Disp: 30 tablet, Rfl: 1    esomeprazole (NexIUM) 40 MG packet, Take 1 capsule by mouth Every Night., Disp: , Rfl:     ferrous sulfate 325 (65 FE) MG EC tablet, Take 1 tablet by mouth Daily With Breakfast., Disp: 90 tablet, Rfl: 1    furosemide (LASIX) 20 MG tablet, TAKE 1 TABLET BY MOUTH TWICE A DAY, Disp: 180 tablet, Rfl: 1    levocetirizine (XYZAL) 5 MG tablet, Take 1 tablet by mouth Every Evening., Disp: , Rfl:     MAGNESIUM OXIDE PO, Take 250 mg by mouth Daily., Disp: , Rfl:     metoprolol succinate XL (TOPROL-XL) 25 MG 24 hr tablet, Take 2 tablets by mouth Daily., Disp: 90 tablet, Rfl: 1    potassium chloride (KLOR-CON) 20 MEQ CR tablet, Take 1 tablet by mouth Daily., Disp: 90 tablet, Rfl: 1    spironolactone (ALDACTONE) 25 MG tablet, Take 1 tablet by mouth Daily., Disp: , Rfl:     vitamin D (ERGOCALCIFEROL) 1.25 MG (60260 UT) capsule capsule, Take 1 capsule by mouth Every 30 (Thirty) Days., Disp: , Rfl:     azithromycin (Zithromax Z-Donovan) 250 MG tablet, Take 2 tablets the first day, then 1 tablet daily for 4 days., Disp: 6 tablet,  "Rfl: 0    Review of Systems   Constitutional:  Negative for chills, fatigue and fever.   HENT:  Positive for sore throat. Negative for congestion, ear pain and sinus pressure.    Respiratory:  Positive for cough. Negative for chest tightness, shortness of breath and wheezing.    Cardiovascular:  Negative for chest pain and palpitations.   Gastrointestinal:  Negative for abdominal pain, blood in stool and constipation.   Skin:  Negative for color change.   Allergic/Immunologic: Negative for environmental allergies.   Neurological:  Negative for dizziness, speech difficulty and headache.   Psychiatric/Behavioral:  Negative for decreased concentration. The patient is not nervous/anxious.         Vital Signs  Vitals:    03/06/24 1046   BP: 148/82   BP Location: Right arm   Patient Position: Sitting   Cuff Size: Adult   Pulse: (!) 125   Temp: 97.8 °F (36.6 °C)   TempSrc: Infrared   SpO2: 98%   Weight: 103 kg (227 lb 12.8 oz)   Height: 182.9 cm (72\")   PainSc: 0-No pain       Physical Exam  Constitutional:       General: He is not in acute distress.     Appearance: Normal appearance. He is not ill-appearing.   HENT:      Right Ear: Tympanic membrane and ear canal normal.      Left Ear: Tympanic membrane and ear canal normal.      Mouth/Throat:      Comments: There is mild posterior pharyngeal erythema with drainage present.  Cardiovascular:      Rate and Rhythm: Normal rate and regular rhythm.   Pulmonary:      Effort: Pulmonary effort is normal.      Breath sounds: Normal breath sounds.   Musculoskeletal:      Cervical back: Normal range of motion and neck supple.   Lymphadenopathy:      Cervical: No cervical adenopathy.   Neurological:      Mental Status: He is alert.          Procedures    ACE III MINI             Assessment/Plan:    Diagnoses and all orders for this visit:    1. Acute non-recurrent maxillary sinusitis (Primary)  -     triamcinolone acetonide (KENALOG-40) injection 80 mg    Other orders  -     " azithromycin (Zithromax Z-Donovan) 250 MG tablet; Take 2 tablets the first day, then 1 tablet daily for 4 days.  Dispense: 6 tablet; Refill: 0      1. Acute sinusitis.  This is most likely secondary to an initial viral upper respiratory infection. He is given azithromycin and parenteral triamcinolone.    2. Statin myalgia.  He had significant musculoskeletal pain and balance issues, which have for the most part resolved with cessation of statin therapy. At some point, we will need to revisit this.    Follow-up  He has a regular appointment next month; at which time we will repeat labs and consider a different statin.    Plan of care reviewed with patient at the conclusion of today's visit. Education was provided regarding diagnosis, management, and any prescribed or recommended OTC medications.Patient verbalizes understanding of and agreement with management plan.       Transcribed from ambient dictation for Wilfred Kim MD by Mona Thomas.  03/06/24   13:56 EST    Patient or patient representative verbalized consent to the visit recording.  I have personally performed the services described in this document as transcribed by the above individual, and it is both accurate and complete.

## 2024-03-12 RX ORDER — ATORVASTATIN CALCIUM 40 MG/1
40 TABLET, FILM COATED ORAL DAILY
Qty: 90 TABLET | Refills: 1 | Status: SHIPPED | OUTPATIENT
Start: 2024-03-12

## 2024-03-25 RX ORDER — POTASSIUM CHLORIDE 1500 MG/1
20 TABLET, EXTENDED RELEASE ORAL DAILY
Qty: 90 TABLET | Refills: 1 | Status: SHIPPED | OUTPATIENT
Start: 2024-03-25

## 2024-03-25 NOTE — TELEPHONE ENCOUNTER
Rx Refill Note  Requested Prescriptions     Pending Prescriptions Disp Refills    KLOR-CON 20 MEQ CR tablet [Pharmacy Med Name: KLOR-CON M20 TABLET] 90 tablet 1     Sig: TAKE 1 TABLET BY MOUTH EVERY DAY      Last office visit with prescribing clinician: 3/6/2024   Last telemedicine visit with prescribing clinician: Visit date not found   Next office visit with prescribing clinician: 4/11/2024                         Would you like a call back once the refill request has been completed: [] Yes [] No    If the office needs to give you a call back, can they leave a voicemail: [] Yes [] No    Alba Lazaro LPN  03/25/24, 11:08 EDT

## 2024-04-11 ENCOUNTER — LAB (OUTPATIENT)
Dept: LAB | Facility: HOSPITAL | Age: 64
End: 2024-04-11
Payer: COMMERCIAL

## 2024-04-11 ENCOUNTER — OFFICE VISIT (OUTPATIENT)
Dept: INTERNAL MEDICINE | Facility: CLINIC | Age: 64
End: 2024-04-11
Payer: COMMERCIAL

## 2024-04-11 VITALS
BODY MASS INDEX: 30.63 KG/M2 | SYSTOLIC BLOOD PRESSURE: 128 MMHG | OXYGEN SATURATION: 98 % | WEIGHT: 218.8 LBS | HEART RATE: 77 BPM | DIASTOLIC BLOOD PRESSURE: 74 MMHG | TEMPERATURE: 98.2 F | HEIGHT: 71 IN

## 2024-04-11 DIAGNOSIS — G62.9 NEUROPATHY: ICD-10-CM

## 2024-04-11 DIAGNOSIS — E55.9 VITAMIN D DEFICIENCY: ICD-10-CM

## 2024-04-11 DIAGNOSIS — E78.00 HYPERCHOLESTEROLEMIA: ICD-10-CM

## 2024-04-11 DIAGNOSIS — R73.09 ABNORMAL GLUCOSE: ICD-10-CM

## 2024-04-11 DIAGNOSIS — D50.0 IRON DEFICIENCY ANEMIA DUE TO CHRONIC BLOOD LOSS: ICD-10-CM

## 2024-04-11 DIAGNOSIS — Z00.00 PREVENTATIVE HEALTH CARE: Primary | ICD-10-CM

## 2024-04-11 DIAGNOSIS — I10 ESSENTIAL HYPERTENSION: ICD-10-CM

## 2024-04-11 DIAGNOSIS — M1A.09X0 IDIOPATHIC CHRONIC GOUT OF MULTIPLE SITES WITHOUT TOPHUS: ICD-10-CM

## 2024-04-11 DIAGNOSIS — K76.0 HEPATIC STEATOSIS: ICD-10-CM

## 2024-04-11 DIAGNOSIS — K21.9 GASTROESOPHAGEAL REFLUX DISEASE WITHOUT ESOPHAGITIS: ICD-10-CM

## 2024-04-11 LAB
25(OH)D3 SERPL-MCNC: 42.7 NG/ML (ref 30–100)
ALBUMIN SERPL-MCNC: 4.6 G/DL (ref 3.5–5.2)
ALBUMIN/GLOB SERPL: 1.8 G/DL
ALP SERPL-CCNC: 114 U/L (ref 39–117)
ALT SERPL W P-5'-P-CCNC: 53 U/L (ref 1–41)
ANION GAP SERPL CALCULATED.3IONS-SCNC: 11.3 MMOL/L (ref 5–15)
AST SERPL-CCNC: 55 U/L (ref 1–40)
BASOPHILS # BLD AUTO: 0.03 10*3/MM3 (ref 0–0.2)
BASOPHILS NFR BLD AUTO: 0.7 % (ref 0–1.5)
BILIRUB SERPL-MCNC: 0.8 MG/DL (ref 0–1.2)
BUN SERPL-MCNC: 11 MG/DL (ref 8–23)
BUN/CREAT SERPL: 13.4 (ref 7–25)
CALCIUM SPEC-SCNC: 9.5 MG/DL (ref 8.6–10.5)
CHLORIDE SERPL-SCNC: 104 MMOL/L (ref 98–107)
CHOLEST SERPL-MCNC: 219 MG/DL (ref 0–200)
CO2 SERPL-SCNC: 26.7 MMOL/L (ref 22–29)
CREAT SERPL-MCNC: 0.82 MG/DL (ref 0.76–1.27)
DEPRECATED RDW RBC AUTO: 46 FL (ref 37–54)
EGFRCR SERPLBLD CKD-EPI 2021: 98.7 ML/MIN/1.73
EOSINOPHIL # BLD AUTO: 0.09 10*3/MM3 (ref 0–0.4)
EOSINOPHIL NFR BLD AUTO: 2 % (ref 0.3–6.2)
ERYTHROCYTE [DISTWIDTH] IN BLOOD BY AUTOMATED COUNT: 12.4 % (ref 12.3–15.4)
FERRITIN SERPL-MCNC: 944 NG/ML (ref 30–400)
GLOBULIN UR ELPH-MCNC: 2.6 GM/DL
GLUCOSE SERPL-MCNC: 72 MG/DL (ref 65–99)
HBA1C MFR BLD: 5 % (ref 4.8–5.6)
HCT VFR BLD AUTO: 48.3 % (ref 37.5–51)
HDLC SERPL-MCNC: 117 MG/DL (ref 40–60)
HGB BLD-MCNC: 16.6 G/DL (ref 13–17.7)
IMM GRANULOCYTES # BLD AUTO: 0 10*3/MM3 (ref 0–0.05)
IMM GRANULOCYTES NFR BLD AUTO: 0 % (ref 0–0.5)
IRON 24H UR-MRATE: 92 MCG/DL (ref 59–158)
IRON SATN MFR SERPL: 28 % (ref 20–50)
LDLC SERPL CALC-MCNC: 91 MG/DL (ref 0–100)
LDLC/HDLC SERPL: 0.77 {RATIO}
LYMPHOCYTES # BLD AUTO: 0.91 10*3/MM3 (ref 0.7–3.1)
LYMPHOCYTES NFR BLD AUTO: 19.9 % (ref 19.6–45.3)
MCH RBC QN AUTO: 35 PG (ref 26.6–33)
MCHC RBC AUTO-ENTMCNC: 34.4 G/DL (ref 31.5–35.7)
MCV RBC AUTO: 101.9 FL (ref 79–97)
MONOCYTES # BLD AUTO: 0.45 10*3/MM3 (ref 0.1–0.9)
MONOCYTES NFR BLD AUTO: 9.8 % (ref 5–12)
NEUTROPHILS NFR BLD AUTO: 3.09 10*3/MM3 (ref 1.7–7)
NEUTROPHILS NFR BLD AUTO: 67.6 % (ref 42.7–76)
NRBC BLD AUTO-RTO: 0 /100 WBC (ref 0–0.2)
PLATELET # BLD AUTO: 157 10*3/MM3 (ref 140–450)
PMV BLD AUTO: 10.4 FL (ref 6–12)
POTASSIUM SERPL-SCNC: 4.2 MMOL/L (ref 3.5–5.2)
PROT SERPL-MCNC: 7.2 G/DL (ref 6–8.5)
RBC # BLD AUTO: 4.74 10*6/MM3 (ref 4.14–5.8)
SODIUM SERPL-SCNC: 142 MMOL/L (ref 136–145)
TIBC SERPL-MCNC: 325 MCG/DL (ref 298–536)
TRANSFERRIN SERPL-MCNC: 218 MG/DL (ref 200–360)
TRIGL SERPL-MCNC: 62 MG/DL (ref 0–150)
TSH SERPL DL<=0.05 MIU/L-ACNC: 1.31 UIU/ML (ref 0.27–4.2)
URATE SERPL-MCNC: 4.1 MG/DL (ref 3.4–7)
VIT B12 BLD-MCNC: 1354 PG/ML (ref 211–946)
VLDLC SERPL-MCNC: 11 MG/DL (ref 5–40)
WBC NRBC COR # BLD AUTO: 4.57 10*3/MM3 (ref 3.4–10.8)

## 2024-04-11 PROCEDURE — 82172 ASSAY OF APOLIPOPROTEIN: CPT

## 2024-04-11 PROCEDURE — 84550 ASSAY OF BLOOD/URIC ACID: CPT

## 2024-04-11 PROCEDURE — 85025 COMPLETE CBC W/AUTO DIFF WBC: CPT

## 2024-04-11 PROCEDURE — 82306 VITAMIN D 25 HYDROXY: CPT

## 2024-04-11 PROCEDURE — 82728 ASSAY OF FERRITIN: CPT

## 2024-04-11 PROCEDURE — 82607 VITAMIN B-12: CPT

## 2024-04-11 PROCEDURE — 36415 COLL VENOUS BLD VENIPUNCTURE: CPT

## 2024-04-11 PROCEDURE — 80053 COMPREHEN METABOLIC PANEL: CPT

## 2024-04-11 PROCEDURE — 80061 LIPID PANEL: CPT

## 2024-04-11 PROCEDURE — 83036 HEMOGLOBIN GLYCOSYLATED A1C: CPT

## 2024-04-11 PROCEDURE — 84466 ASSAY OF TRANSFERRIN: CPT

## 2024-04-11 PROCEDURE — 83540 ASSAY OF IRON: CPT

## 2024-04-11 PROCEDURE — 99396 PREV VISIT EST AGE 40-64: CPT | Performed by: INTERNAL MEDICINE

## 2024-04-11 PROCEDURE — 84443 ASSAY THYROID STIM HORMONE: CPT

## 2024-04-11 RX ORDER — LANOLIN ALCOHOL/MO/W.PET/CERES
1000 CREAM (GRAM) TOPICAL DAILY
COMMUNITY

## 2024-04-11 RX ORDER — SPIRONOLACTONE 25 MG/1
25 TABLET ORAL DAILY
Qty: 90 TABLET | Refills: 3 | Status: SHIPPED | OUTPATIENT
Start: 2024-04-11

## 2024-04-11 RX ORDER — LOSARTAN POTASSIUM 25 MG/1
25 TABLET ORAL DAILY
COMMUNITY

## 2024-04-11 RX ORDER — ERGOCALCIFEROL 1.25 MG/1
50000 CAPSULE ORAL
Qty: 5 CAPSULE | Refills: 5 | Status: SHIPPED | OUTPATIENT
Start: 2024-04-11

## 2024-04-11 NOTE — PROGRESS NOTES
[facility]     [facility] MELY [facility]  1960   [id number]      Patient Care Team:  Wilfred Kim MD as PCP - General (Internal Medicine)  Pawel Leahy MD as Consulting Physician (Cardiology)    Chief Complaint::   Chief Complaint   Patient presents with    Annual Exam    Hypertension        HPI  The patient is a 63-year-old male who comes in for annual examination and for follow-up of hypertension, hyperlipidemia, hepatic steatosis, GERD, and gout.    The patient reports a significant improvement in his balance and tremors, although not fully recovered. He has been prescribed tremor medications, which have proven beneficial. His strength has significantly improved, although he acknowledges that he is not at his desired level. He utilizes the handrail when carrying objects down the basement due to balance issues. He initiated chair yoga two weeks ago, which he performs every other day in his office. His physical therapist suspects the onset of neuropathy in his feet due to pain and numbness in areas. A prick test conducted by his physical therapist revealed 10 on each foot, but 3 incorrectly, prompting him to retain 70 percent capacity. He receives his B12 prescription and takes vitamin D monthly. He spends the majority of his day at a desk.    The patient's mother had a history of fatty liver, despite her minimal alcohol consumption.    Chronic Conditions:      Patient Active Problem List   Diagnosis    Essential hypertension    Mixed hyperlipidemia    Class 2 obesity due to excess calories without serious comorbidity in adult    Reactive depression    Gastroesophageal reflux disease without esophagitis    Vitamin D deficiency    Chronic low back pain    Dyspnea on exertion    Syncope, tussive    Hypokalemia    Cough syncope    Allergic rhinitis    Cough    Hypercholesterolemia    Iron deficiency anemia    Vasovagal syncope    Hernia of abdominal cavity    Right sided sciatica    Hepatic  steatosis    Acute gout involving toe of left foot    Idiopathic chronic gout of multiple sites without tophus        Past Medical History:   Diagnosis Date    Anemia     iron deficiency    Arthritis of back 2003    Chronic low back pain 03/14/2018    Colonic polyp     Difficulty walking 2022    ballence issue    Difficulty walking     Dizziness     ED (erectile dysfunction)     Environmental allergies     Esophagitis     Essential hypertension 03/14/2018    Gastroesophageal reflux disease without esophagitis 03/14/2018    Gout     Hernia of abdominal cavity     Hip arthrosis 2003    HL (hearing loss) 2010    left ear    Hyperlipidemia     Hypertension     Low back strain     off and on    Periarthritis of shoulder 2020    Reactive depression 03/14/2018    Rotator cuff syndrome 2020    Tennis elbow     Vasovagal syncope 02/13/2019    Vitamin D deficiency 03/14/2018    Weakness        Past Surgical History:   Procedure Laterality Date    APPENDECTOMY      CARDIAC CATHETERIZATION  2011    COLONOSCOPY      ELBOW ARTHROPLASTY      Right     ELBOW PROCEDURE  2003    ENDOSCOPY  2011    ENDOSCOPY  2008    with biopsy    ESOPHAGEAL MOTILITY STUDY N/A 06/27/2019    Procedure: MANOMETRY;  Surgeon: Mark Lowery MD;  Location: [facility] ENDOSCOPY;  Service: Gastroenterology    FOOT SURGERY      left foot     HAND SURGERY  2022    UPPER GASTROINTESTINAL ENDOSCOPY      WRIST SURGERY  2022       Family History   Problem Relation Age of Onset    Alzheimer's disease Mother     Dementia Mother     Hypertension Mother     High cholesterol Mother     Colon polyps Father     Heart disease Father     Heart attack Father     Colon cancer Father     Coronary artery disease Father     Lung disease Sister     No Known Problems Maternal Grandmother     Alzheimer's disease Maternal Grandfather     Cancer Paternal Grandmother     Lung cancer Paternal Grandmother     Heart disease Paternal Grandfather     Heart disease Son     Esophageal  cancer Neg Hx        Social History     Socioeconomic History    Marital status:    Tobacco Use    Smoking status: Never     Passive exposure: Never    Smokeless tobacco: Former     Types: Chew     Quit date: 2002   Vaping Use    Vaping status: Never Used   Substance and Sexual Activity    Alcohol use: Yes     Alcohol/week: 14.0 standard drinks of alcohol     Types: 14 Shots of liquor per week     Comment: per day, [place]     Drug use: No    Sexual activity: Yes     Partners: Female     Birth control/protection: None       Allergies   Allergen Reactions    Contrast Dye (Echo Or Unknown Ct/Mr) Rash         Current Outpatient Medications:     allopurinol (ZYLOPRIM) 100 MG tablet, TAKE 1 TABLET BY MOUTH EVERY DAY, Disp: 90 tablet, Rfl: 1    ASCORBIC ACID PO, Take 1 tablet by mouth Daily., Disp: , Rfl:     aspirin 81 MG EC tablet, Take 1 tablet by mouth Daily., Disp: 30 tablet, Rfl: 11    colchicine 0.6 MG tablet, TAKE 1 TABLET BY MOUTH EVERY DAY, Disp: 30 tablet, Rfl: 1    esomeprazole (NexIUM) 40 MG packet, Take 1 capsule by mouth Every Night., Disp: , Rfl:     ferrous sulfate 325 (65 FE) MG EC tablet, Take 1 tablet by mouth Daily With Breakfast., Disp: 90 tablet, Rfl: 1    furosemide (LASIX) 20 MG tablet, TAKE 1 TABLET BY MOUTH TWICE A DAY, Disp: 180 tablet, Rfl: 1    KLOR-CON 20 MEQ CR tablet, TAKE 1 TABLET BY MOUTH EVERY DAY, Disp: 90 tablet, Rfl: 1    losartan (COZAAR) 25 MG tablet, Take 1 tablet by mouth Daily., Disp: , Rfl:     MAGNESIUM OXIDE PO, Take 250 mg by mouth Daily., Disp: , Rfl:     spironolactone (ALDACTONE) 25 MG tablet, Take 1 tablet by mouth Daily., Disp: 90 tablet, Rfl: 3    vitamin B-12 (CYANOCOBALAMIN) 1000 MCG tablet, Take 1 tablet by mouth Daily., Disp: , Rfl:     vitamin D (ERGOCALCIFEROL) 1.25 MG (69244 UT) capsule capsule, Take 1 capsule by mouth Every 7 (Seven) Days., Disp: 5 capsule, Rfl: 5    Immunization History   Administered Date(s) Administered    COVID-19 (PFIZER) Purple Cap  "Monovalent 03/11/2021, 04/27/2021, 01/04/2022    Flu Vaccine Intradermal Quad 18-64YR 10/25/2018    Flu Vaccine Quad PF >36MO 10/04/2017    Flublok 18+yrs 11/30/2021, 12/09/2022    Fluzone (or Fluarix & Flulaval for VFC) >6mos 09/18/2020, 10/14/2023    Fluzone Quad >6mos (Multi-dose) 11/25/2015    Hepatitis A 09/05/2019, 09/18/2020    Influenza Quad Vaccine (Inpatient) 11/25/2015    Influenza TIV (IM) 09/08/2009, 10/31/2014    Influenza, Unspecified 10/25/2018    Tdap 03/03/2011, 07/17/2014    Zostavax 11/25/2015    flucelvax quad pfs =>4 YRS 10/11/2019        Health Maintenance Due   Topic Date Due    Pneumococcal Vaccine 0-64 (1 of 2 - PCV) Never done    ZOSTER VACCINE (2 of 3) 01/20/2016    RSV Vaccine - Adults (1 - 1-dose 60+ series) Never done    COVID-19 Vaccine (4 - 2023-24 season) 09/01/2023    ANNUAL PHYSICAL  03/27/2024        Review of Systems   A review of systems was performed, and positive findings are noted in the HPI.    Vital Signs  Vitals:    04/11/24 1012   BP: 128/74   BP Location: Left arm   Patient Position: Sitting   Cuff Size: Adult   Pulse: 77   Temp: 98.2 °F (36.8 °C)   SpO2: 98%   Weight: 99.2 kg (218 lb 12.8 oz)   Height: 180.3 cm (71\")   PainSc: 0-No pain   Laboratory Studies  Liver function test showed elevated liver function test.    Physical Exam  Vitals and nursing note reviewed.   Constitutional:       Appearance: He is well-developed. He is obese.   HENT:      Head: Normocephalic and atraumatic.      Right Ear: External ear normal.      Left Ear: External ear normal.      Nose: Nose normal.      Mouth/Throat:      Pharynx: No oropharyngeal exudate.   Eyes:      Conjunctiva/sclera: Conjunctivae normal.      Pupils: Pupils are equal, round, and reactive to light.   Neck:      Thyroid: No thyromegaly.      Vascular: No JVD.   Cardiovascular:      Rate and Rhythm: Normal rate and regular rhythm.      Heart sounds: Normal heart sounds. No murmur heard.     No friction rub. No gallop. "   Pulmonary:      Effort: Pulmonary effort is normal. No respiratory distress.      Breath sounds: Normal breath sounds. No wheezing or rales.   Chest:      Chest wall: No tenderness.   Abdominal:      General: Bowel sounds are normal. There is no distension.      Palpations: Abdomen is soft. There is no mass.      Tenderness: There is no abdominal tenderness. There is no guarding or rebound.      Hernia: A hernia is present. Hernia is present in the umbilical area.   Musculoskeletal:         General: No tenderness. Normal range of motion.      Cervical back: Normal range of motion and neck supple.   Lymphadenopathy:      Cervical: No cervical adenopathy.   Skin:     General: Skin is warm and dry.      Findings: No erythema or rash.   Neurological:      Mental Status: He is alert and oriented to person, place, and time.      Cranial Nerves: No cranial nerve deficit.      Sensory: No sensory deficit.      Motor: No abnormal muscle tone.      Coordination: Coordination normal.      Deep Tendon Reflexes: Reflexes normal.   Psychiatric:         Behavior: Behavior normal.         Thought Content: Thought content normal.         Judgment: Judgment normal.          Procedures     Fall Risk Screen:  [facility] Fall Risk Assessment has not been completed.    Health Habits and Functional and Cognitive Screening:       No data to display                Depression [place]  PHQ-9 Total Score: 0     ACE III MINI             Assessment/Plan:    Diagnoses and all orders for this visit:    1. Preventative health care (Primary)    2. Essential hypertension  -     CBC & Differential; Future    3. Hypercholesterolemia  -     Apolipoprotein B; Future  -     Comprehensive Metabolic Panel; Future  -     Lipid Panel; Future    4. Hepatic steatosis    5. Gastroesophageal reflux disease without esophagitis    6. Idiopathic chronic gout of multiple sites without tophus  -     Uric Acid; Future    7. Vitamin D deficiency  -     Vitamin  D,25-Hydroxy; Future    8. Iron deficiency anemia due to chronic blood loss  -     Iron Profile; Future  -     Ferritin; Future    9. Neuropathy  -     TSH; Future  -     Vitamin B12; Future    10. Abnormal glucose  -     Hemoglobin A1c; Future    Other orders  -     spironolactone (ALDACTONE) 25 MG tablet; Take 1 tablet by mouth Daily.  Dispense: 90 tablet; Refill: 3  -     vitamin D (ERGOCALCIFEROL) 1.25 MG (25375 UT) capsule capsule; Take 1 capsule by mouth Every 7 (Seven) Days.  Dispense: 5 capsule; Refill: 5    1. Preventive Care.  The patient's condition has significantly improved following the discontinuation of atorvastatin, and he has begun gentle exercise with chair yoga. His balance has improved with physical therapy. He is current with his colorectal cancer screening. The objective is to transition from chair yoga to more vigorous exercise, including cardiovascular and strength training.    2. Hypertension.  The patient's blood pressure is well-managed with losartan and spironolactone.    3. Hyperlipidemia.  A lipid panel is pending. Should lipid levels be elevated, ezetimibe will be considered.    4. Hepatic Steatosis.  The patient's liver function tests have been moderately elevated for some time. An ultrasound last summer revealed a fatty liver. The significance of adding choline in the form of eggs to his diet was discussed, along with the continued focus on weight loss, and the possibility of lipid-lowering therapy.    5. Gastroesophageal Reflux Disease (GERD).  The patient's GERD is well-managed with esomeprazole.    6. Gout.  The patient is asymptomatic on colchicine and allopurinol.    7. Vitamin D deficiency.  The patient's vitamin D level is pending. He has been advised to start taking his vitamin D weekly instead of monthly.    8. History of iron deficiency anemia.  The patient's Complete Blood Count (CBC) and iron levels are pending.    9. Potential early peripheral neuropathy.  Vitamin B12  and thyroid function tests have been obtained.    Follow-up  The patient is scheduled for a follow-up visit in 6 months.      Labs  Results for orders placed or performed in visit on 11/09/23   CBC Auto Differential    Specimen: Blood   Result Value Ref Range    WBC 3.59 3.40 - 10.80 10*3/mm3    RBC 4.70 4.14 - 5.80 10*6/mm3    Hemoglobin 17.4 13.0 - 17.7 g/dL    Hematocrit 48.5 37.5 - 51.0 %    .2 (H) 79.0 - 97.0 fL    MCH 37.0 (H) 26.6 - 33.0 pg    MCHC 35.9 (H) 31.5 - 35.7 g/dL    RDW 11.9 (L) 12.3 - 15.4 %    RDW-SD 45.0 37.0 - 54.0 fl    MPV 11.3 6.0 - 12.0 fL    Platelets 153 140 - 450 10*3/mm3    Neutrophil % 59.4 42.7 - 76.0 %    Lymphocyte % 20.6 19.6 - 45.3 %    Monocyte % 13.9 (H) 5.0 - 12.0 %    Eosinophil % 4.7 0.3 - 6.2 %    Basophil % 1.1 0.0 - 1.5 %    Immature Grans % 0.3 0.0 - 0.5 %    Neutrophils, Absolute 2.13 1.70 - 7.00 10*3/mm3    Lymphocytes, Absolute 0.74 0.70 - 3.10 10*3/mm3    Monocytes, Absolute 0.50 0.10 - 0.90 10*3/mm3    Eosinophils, Absolute 0.17 0.00 - 0.40 10*3/mm3    Basophils, Absolute 0.04 0.00 - 0.20 10*3/mm3    Immature Grans, Absolute 0.01 0.00 - 0.05 10*3/mm3    nRBC 0.0 0.0 - 0.2 /100 WBC        Counseling was given to patient for the following topics: appropriate exercise as he is doing, disease prevention, and healthy eating habits.    Plan of care reviewed with patient at the conclusion of today's visit. Education was provided regarding diagnosis, management, and any prescribed or recommended OTC medications.Patient verbalizes understanding of and agreement with management plan.         Wilfred Kim MD       Transcribed from ambient dictation for Wilfred Kim MD by Michelle CHRISTIANSON  04/11/24   13:19 EDT    Patient or patient representative verbalized consent to the visit recording.  I have personally performed the services described in this document as transcribed by the above individual, and it is both accurate and complete.

## 2024-04-16 LAB — APO B SERPL-MCNC: 83 MG/DL

## 2024-04-29 DIAGNOSIS — M10.9 ACUTE GOUT INVOLVING TOE OF LEFT FOOT, UNSPECIFIED CAUSE: ICD-10-CM

## 2024-04-29 RX ORDER — LOSARTAN POTASSIUM 25 MG/1
25 TABLET ORAL DAILY
Qty: 90 TABLET | Refills: 1 | Status: SHIPPED | OUTPATIENT
Start: 2024-04-29

## 2024-04-29 RX ORDER — ALLOPURINOL 100 MG/1
TABLET ORAL
Qty: 90 TABLET | Refills: 1 | Status: SHIPPED | OUTPATIENT
Start: 2024-04-29

## 2024-04-29 RX ORDER — COLCHICINE 0.6 MG/1
TABLET ORAL
Qty: 90 TABLET | Refills: 1 | Status: SHIPPED | OUTPATIENT
Start: 2024-04-29

## 2024-04-29 RX ORDER — FUROSEMIDE 20 MG/1
TABLET ORAL
Qty: 180 TABLET | Refills: 1 | Status: SHIPPED | OUTPATIENT
Start: 2024-04-29

## 2024-04-29 RX ORDER — SALICYLIC ACID 40 %
81 ADHESIVE PATCH, MEDICATED TOPICAL DAILY
Qty: 90 TABLET | Refills: 3 | Status: SHIPPED | OUTPATIENT
Start: 2024-04-29

## 2024-06-06 RX ORDER — METOPROLOL SUCCINATE 25 MG/1
50 TABLET, EXTENDED RELEASE ORAL DAILY
Qty: 180 TABLET | OUTPATIENT
Start: 2024-06-06

## 2024-07-15 ENCOUNTER — TELEPHONE (OUTPATIENT)
Dept: INTERNAL MEDICINE | Facility: CLINIC | Age: 64
End: 2024-07-15
Payer: COMMERCIAL

## 2024-07-15 DIAGNOSIS — M79.672 PAIN IN BOTH FEET: Primary | ICD-10-CM

## 2024-07-15 DIAGNOSIS — M79.671 PAIN IN BOTH FEET: Primary | ICD-10-CM

## 2024-07-15 NOTE — TELEPHONE ENCOUNTER
PATIENT CALLED AND NEEDS A REFERRAL TO A GOOD FOOT DOCTOR. HE SAID HIS FEET ARE STARTING TO GET VERY PAINFUL.

## 2024-09-04 RX ORDER — METOPROLOL SUCCINATE 25 MG/1
25 TABLET, EXTENDED RELEASE ORAL DAILY
Qty: 90 TABLET | Refills: 1 | Status: SHIPPED | OUTPATIENT
Start: 2024-09-04

## 2024-09-04 RX ORDER — ATORVASTATIN CALCIUM 40 MG/1
40 TABLET, FILM COATED ORAL DAILY
Qty: 90 TABLET | Refills: 1 | OUTPATIENT
Start: 2024-09-04

## 2024-09-09 RX ORDER — AMITRIPTYLINE HYDROCHLORIDE 10 MG/1
10 TABLET ORAL
Qty: 90 TABLET | Refills: 3 | OUTPATIENT
Start: 2024-09-09

## 2024-09-16 RX ORDER — POTASSIUM CHLORIDE 1500 MG/1
20 TABLET, EXTENDED RELEASE ORAL DAILY
Qty: 90 TABLET | Refills: 1 | Status: SHIPPED | OUTPATIENT
Start: 2024-09-16

## 2024-09-20 DIAGNOSIS — M54.9 RADIATING BACK PAIN: Primary | ICD-10-CM

## 2024-09-26 ENCOUNTER — TELEPHONE (OUTPATIENT)
Dept: INTERNAL MEDICINE | Facility: CLINIC | Age: 64
End: 2024-09-26
Payer: COMMERCIAL

## 2024-09-26 DIAGNOSIS — E55.9 VITAMIN D DEFICIENCY: ICD-10-CM

## 2024-09-26 DIAGNOSIS — R73.09 ABNORMAL GLUCOSE: ICD-10-CM

## 2024-09-26 DIAGNOSIS — I10 ESSENTIAL HYPERTENSION: Primary | ICD-10-CM

## 2024-09-26 DIAGNOSIS — M1A.09X0 IDIOPATHIC CHRONIC GOUT OF MULTIPLE SITES WITHOUT TOPHUS: ICD-10-CM

## 2024-09-26 DIAGNOSIS — E78.2 MIXED HYPERLIPIDEMIA: ICD-10-CM

## 2024-09-26 DIAGNOSIS — Z12.5 PROSTATE CANCER SCREENING: ICD-10-CM

## 2024-09-27 ENCOUNTER — LAB (OUTPATIENT)
Dept: LAB | Facility: HOSPITAL | Age: 64
End: 2024-09-27
Payer: COMMERCIAL

## 2024-09-27 DIAGNOSIS — M1A.09X0 IDIOPATHIC CHRONIC GOUT OF MULTIPLE SITES WITHOUT TOPHUS: ICD-10-CM

## 2024-09-27 DIAGNOSIS — E78.2 MIXED HYPERLIPIDEMIA: ICD-10-CM

## 2024-09-27 DIAGNOSIS — E55.9 VITAMIN D DEFICIENCY: ICD-10-CM

## 2024-09-27 DIAGNOSIS — I10 ESSENTIAL HYPERTENSION: ICD-10-CM

## 2024-09-27 DIAGNOSIS — R73.09 ABNORMAL GLUCOSE: ICD-10-CM

## 2024-09-27 DIAGNOSIS — Z12.5 PROSTATE CANCER SCREENING: ICD-10-CM

## 2024-09-27 LAB
25(OH)D3 SERPL-MCNC: 53.4 NG/ML (ref 30–100)
ALBUMIN SERPL-MCNC: 4.7 G/DL (ref 3.5–5.2)
ALBUMIN/GLOB SERPL: 1.7 G/DL
ALP SERPL-CCNC: 119 U/L (ref 39–117)
ALT SERPL W P-5'-P-CCNC: 87 U/L (ref 1–41)
ANION GAP SERPL CALCULATED.3IONS-SCNC: 13.5 MMOL/L (ref 5–15)
AST SERPL-CCNC: 91 U/L (ref 1–40)
BASOPHILS # BLD AUTO: 0.04 10*3/MM3 (ref 0–0.2)
BASOPHILS NFR BLD AUTO: 1 % (ref 0–1.5)
BILIRUB SERPL-MCNC: 0.7 MG/DL (ref 0–1.2)
BUN SERPL-MCNC: 9 MG/DL (ref 8–23)
BUN/CREAT SERPL: 9.8 (ref 7–25)
CALCIUM SPEC-SCNC: 9.9 MG/DL (ref 8.6–10.5)
CHLORIDE SERPL-SCNC: 100 MMOL/L (ref 98–107)
CHOLEST SERPL-MCNC: 197 MG/DL (ref 0–200)
CO2 SERPL-SCNC: 25.5 MMOL/L (ref 22–29)
CREAT SERPL-MCNC: 0.92 MG/DL (ref 0.76–1.27)
DEPRECATED RDW RBC AUTO: 49.9 FL (ref 37–54)
EGFRCR SERPLBLD CKD-EPI 2021: 92.9 ML/MIN/1.73
EOSINOPHIL # BLD AUTO: 0.18 10*3/MM3 (ref 0–0.4)
EOSINOPHIL NFR BLD AUTO: 4.5 % (ref 0.3–6.2)
ERYTHROCYTE [DISTWIDTH] IN BLOOD BY AUTOMATED COUNT: 13.1 % (ref 12.3–15.4)
GLOBULIN UR ELPH-MCNC: 2.8 GM/DL
GLUCOSE SERPL-MCNC: 74 MG/DL (ref 65–99)
HBA1C MFR BLD: 5.2 % (ref 4.8–5.6)
HCT VFR BLD AUTO: 48.8 % (ref 37.5–51)
HCYS SERPL-MCNC: 78 UMOL/L (ref 0–15)
HDLC SERPL-MCNC: 86 MG/DL (ref 40–60)
HGB BLD-MCNC: 17.3 G/DL (ref 13–17.7)
IMM GRANULOCYTES # BLD AUTO: 0.01 10*3/MM3 (ref 0–0.05)
IMM GRANULOCYTES NFR BLD AUTO: 0.2 % (ref 0–0.5)
LDLC SERPL CALC-MCNC: 97 MG/DL (ref 0–100)
LDLC/HDLC SERPL: 1.11 {RATIO}
LYMPHOCYTES # BLD AUTO: 1.23 10*3/MM3 (ref 0.7–3.1)
LYMPHOCYTES NFR BLD AUTO: 30.7 % (ref 19.6–45.3)
MCH RBC QN AUTO: 37 PG (ref 26.6–33)
MCHC RBC AUTO-ENTMCNC: 35.5 G/DL (ref 31.5–35.7)
MCV RBC AUTO: 104.5 FL (ref 79–97)
MONOCYTES # BLD AUTO: 0.43 10*3/MM3 (ref 0.1–0.9)
MONOCYTES NFR BLD AUTO: 10.7 % (ref 5–12)
NEUTROPHILS NFR BLD AUTO: 2.12 10*3/MM3 (ref 1.7–7)
NEUTROPHILS NFR BLD AUTO: 52.9 % (ref 42.7–76)
NRBC BLD AUTO-RTO: 0 /100 WBC (ref 0–0.2)
PLATELET # BLD AUTO: 182 10*3/MM3 (ref 140–450)
PMV BLD AUTO: 10.5 FL (ref 6–12)
POTASSIUM SERPL-SCNC: 3.9 MMOL/L (ref 3.5–5.2)
PROT SERPL-MCNC: 7.5 G/DL (ref 6–8.5)
PSA SERPL-MCNC: 2.17 NG/ML (ref 0–4)
RBC # BLD AUTO: 4.67 10*6/MM3 (ref 4.14–5.8)
SODIUM SERPL-SCNC: 139 MMOL/L (ref 136–145)
TRIGL SERPL-MCNC: 77 MG/DL (ref 0–150)
URATE SERPL-MCNC: 4 MG/DL (ref 3.4–7)
VLDLC SERPL-MCNC: 14 MG/DL (ref 5–40)
WBC NRBC COR # BLD AUTO: 4.01 10*3/MM3 (ref 3.4–10.8)

## 2024-09-27 PROCEDURE — 84550 ASSAY OF BLOOD/URIC ACID: CPT

## 2024-09-27 PROCEDURE — 82172 ASSAY OF APOLIPOPROTEIN: CPT

## 2024-09-27 PROCEDURE — 83090 ASSAY OF HOMOCYSTEINE: CPT

## 2024-09-27 PROCEDURE — 83036 HEMOGLOBIN GLYCOSYLATED A1C: CPT

## 2024-09-27 PROCEDURE — 85025 COMPLETE CBC W/AUTO DIFF WBC: CPT

## 2024-09-27 PROCEDURE — 82306 VITAMIN D 25 HYDROXY: CPT

## 2024-09-27 PROCEDURE — 36415 COLL VENOUS BLD VENIPUNCTURE: CPT

## 2024-09-27 PROCEDURE — 80061 LIPID PANEL: CPT

## 2024-09-27 PROCEDURE — 80053 COMPREHEN METABOLIC PANEL: CPT

## 2024-09-27 PROCEDURE — G0103 PSA SCREENING: HCPCS

## 2024-09-30 ENCOUNTER — OFFICE VISIT (OUTPATIENT)
Dept: INTERNAL MEDICINE | Facility: CLINIC | Age: 64
End: 2024-09-30
Payer: COMMERCIAL

## 2024-09-30 ENCOUNTER — TELEPHONE (OUTPATIENT)
Dept: INTERNAL MEDICINE | Facility: CLINIC | Age: 64
End: 2024-09-30

## 2024-09-30 VITALS
SYSTOLIC BLOOD PRESSURE: 118 MMHG | DIASTOLIC BLOOD PRESSURE: 82 MMHG | HEART RATE: 78 BPM | HEIGHT: 71 IN | WEIGHT: 225 LBS | TEMPERATURE: 98.4 F | BODY MASS INDEX: 31.5 KG/M2

## 2024-09-30 DIAGNOSIS — M54.17 LUMBOSACRAL RADICULOPATHY: ICD-10-CM

## 2024-09-30 DIAGNOSIS — E78.2 MIXED HYPERLIPIDEMIA: ICD-10-CM

## 2024-09-30 DIAGNOSIS — K76.0 NON-ALCOHOLIC FATTY LIVER DISEASE: ICD-10-CM

## 2024-09-30 DIAGNOSIS — I10 ESSENTIAL HYPERTENSION: Primary | ICD-10-CM

## 2024-09-30 PROBLEM — E78.00 HYPERCHOLESTEROLEMIA: Status: RESOLVED | Noted: 2019-02-13 | Resolved: 2024-09-30

## 2024-09-30 PROCEDURE — 99214 OFFICE O/P EST MOD 30 MIN: CPT | Performed by: INTERNAL MEDICINE

## 2024-09-30 RX ORDER — GABAPENTIN 300 MG/1
300 CAPSULE ORAL 2 TIMES DAILY
COMMUNITY

## 2024-09-30 NOTE — TELEPHONE ENCOUNTER
Please see if we can get records from Dr David Johnston, podiatry at the [facility], including EMG results.

## 2024-09-30 NOTE — PROGRESS NOTES
[facility]     Bala MELY [facility]  1960   [id number]      Patient Care Team:  Wilfred Kim MD as PCP - General (Internal Medicine)  Pawel Leahy MD as Consulting Physician (Cardiology)    Chief Complaint::   Chief Complaint   Patient presents with    Hypertension        HPI  History of Present Illness  The patient is a 64-year-old male who comes in for follow-up of hypertension, hyperlipidemia, nonalcoholic fatty liver disease, and back pain.    He reports experiencing back pain that radiates into both feet.  He saw podiatrist in August who diagnosed him with neuropathy.  An EMG was performed which showed left S1 and right L5 radiculopathy without evidence of polyneuropathy.  The pain originates from the L5-S1 region and shoots down to his left foot. The intensity of the pain is constant, rated as a 6 or 7 throughout the day, but can escalate to an 8 during flare-ups. Despite taking gabapentin twice daily, but this is not giving him much relief.  The pain in his feet is constant but radiating pain particularly down the right lower extremity is worse on standing.    Additionally, he mentions a recent weight gain over the past month, which he attributes to muscle gain from physical therapy sessions held twice a week.      Chronic Conditions:      Patient Active Problem List   Diagnosis    Essential hypertension    Mixed hyperlipidemia    Class 2 obesity due to excess calories without serious comorbidity in adult    Reactive depression    Gastroesophageal reflux disease without esophagitis    Vitamin D deficiency    Chronic low back pain    Dyspnea on exertion    Syncope, tussive    Hypokalemia    Cough syncope    Allergic rhinitis    Cough    Iron deficiency anemia    Vasovagal syncope    Hernia of abdominal cavity    Right sided sciatica    Non-alcoholic fatty liver disease    Acute gout involving toe of left foot    Idiopathic chronic gout of multiple sites without tophus         Past Medical History:   Diagnosis Date    Anemia     iron deficiency    Arthritis of back 2003    Chronic low back pain 03/14/2018    Colonic polyp     Difficulty walking 2022    ballence issue    Difficulty walking     Dizziness     ED (erectile dysfunction)     Environmental allergies     Esophagitis     Essential hypertension 03/14/2018    Gastroesophageal reflux disease without esophagitis 03/14/2018    Gout     Hernia of abdominal cavity     Hip arthrosis 2003    HL (hearing loss) 2010    left ear    Hyperlipidemia     Hypertension     Low back strain     off and on    Periarthritis of shoulder 2020    Reactive depression 03/14/2018    Rotator cuff syndrome 2020    Tennis elbow     Vasovagal syncope 02/13/2019    Vitamin D deficiency 03/14/2018    Weakness        Past Surgical History:   Procedure Laterality Date    APPENDECTOMY      CARDIAC CATHETERIZATION  2011    COLONOSCOPY      ELBOW ARTHROPLASTY      Right     ELBOW PROCEDURE  2003    ENDOSCOPY  2011    ENDOSCOPY  2008    with biopsy    ESOPHAGEAL MOTILITY STUDY N/A 06/27/2019    Procedure: MANOMETRY;  Surgeon: Mark Lowery MD;  Location: [facility] ENDOSCOPY;  Service: Gastroenterology    FOOT SURGERY      left foot     HAND SURGERY  2022    UPPER GASTROINTESTINAL ENDOSCOPY      WRIST SURGERY  2022       Family History   Problem Relation Age of Onset    Alzheimer's disease Mother     Dementia Mother     Hypertension Mother     High cholesterol Mother     Colon polyps Father     Heart disease Father     Heart attack Father     Colon cancer Father     Coronary artery disease Father     Lung disease Sister     No Known Problems Maternal Grandmother     Alzheimer's disease Maternal Grandfather     Cancer Paternal Grandmother     Lung cancer Paternal Grandmother     Heart disease Paternal Grandfather     Heart disease Son     Esophageal cancer Neg Hx        Social History     Socioeconomic History    Marital status:    Tobacco Use    Smoking  status: Never     Passive exposure: Never    Smokeless tobacco: Former     Types: Chew     Quit date: 2002   Vaping Use    Vaping status: Never Used   Substance and Sexual Activity    Alcohol use: Yes     Alcohol/week: 14.0 standard drinks of alcohol     Types: 14 Shots of liquor per week     Comment: per day, [place]     Drug use: No    Sexual activity: Yes     Partners: Female     Birth control/protection: None       Allergies   Allergen Reactions    Contrast Dye (Echo Or Unknown Ct/Mr) Rash         Current Outpatient Medications:     allopurinol (ZYLOPRIM) 100 MG tablet, TAKE 1 TABLET BY MOUTH EVERY DAY, Disp: 90 tablet, Rfl: 1    ASCORBIC ACID PO, Take 1 tablet by mouth Daily., Disp: , Rfl:     colchicine 0.6 MG tablet, TAKE 1 TABLET BY MOUTH EVERY DAY, Disp: 90 tablet, Rfl: 1    [facility] Aspirin Low Dose 81 MG EC tablet, TAKE 1 TABLET BY MOUTH EVERY DAY, Disp: 90 tablet, Rfl: 3    esomeprazole (NexIUM) 40 MG packet, Take 1 capsule by mouth Every Night., Disp: , Rfl:     ferrous sulfate 325 (65 FE) MG EC tablet, Take 1 tablet by mouth Daily With Breakfast., Disp: 90 tablet, Rfl: 1    folic acid-vit B6-vit B12 (FOLGARD) 2.2-25-1 MG tablet tablet, Take  by mouth Daily., Disp: , Rfl:     furosemide (LASIX) 20 MG tablet, TAKE 1 TABLET BY MOUTH TWICE A DAY, Disp: 180 tablet, Rfl: 1    gabapentin (NEURONTIN) 300 MG capsule, Take 1 capsule by mouth 2 (Two) Times a Day., Disp: , Rfl:     Klor-Con M20 20 MEQ CR tablet, TAKE 1 TABLET BY MOUTH EVERY DAY, Disp: 90 tablet, Rfl: 1    losartan (COZAAR) 25 MG tablet, TAKE 1 TABLET BY MOUTH EVERY DAY, Disp: 90 tablet, Rfl: 1    MAGNESIUM OXIDE PO, Take 250 mg by mouth Daily., Disp: , Rfl:     spironolactone (ALDACTONE) 25 MG tablet, Take 1 tablet by mouth Daily., Disp: 90 tablet, Rfl: 3    vitamin B-12 (CYANOCOBALAMIN) 1000 MCG tablet, Take 1 tablet by mouth Daily., Disp: , Rfl:     vitamin D (ERGOCALCIFEROL) 1.25 MG (96626 UT) capsule capsule, Take 1 capsule by mouth Every 7 (Seven)  "Days., Disp: 5 capsule, Rfl: 5    metoprolol succinate XL (TOPROL-XL) 25 MG 24 hr tablet, TAKE 1 TABLET BY MOUTH EVERY DAY, Disp: 90 tablet, Rfl: 1    Review of Systems   Constitutional: Negative.    Respiratory: Negative.  Negative for chest tightness and shortness of breath.    Cardiovascular: Negative.  Negative for chest pain.   Gastrointestinal:  Negative for abdominal pain, blood in stool, constipation and diarrhea.        Vital Signs  Vitals:    09/30/24 0923   BP: 118/82   Pulse: 78   Temp: 98.4 °F (36.9 °C)   Weight: 102 kg (225 lb)   Height: 180.3 cm (70.98\")       Physical Exam  Vitals reviewed.   Constitutional:       Appearance: Normal appearance. He is well-developed.   HENT:      Head: Normocephalic and atraumatic.   Neck:      Thyroid: No thyromegaly.      Vascular: No carotid bruit.   Cardiovascular:      Rate and Rhythm: Normal rate and regular rhythm.      Heart sounds: Normal heart sounds. No murmur heard.     No friction rub. No gallop.   Pulmonary:      Effort: Pulmonary effort is normal.      Breath sounds: Normal breath sounds.   Musculoskeletal:      Cervical back: Normal range of motion and neck supple.      Right lower leg: No edema.      Left lower leg: No edema.   Lymphadenopathy:      Cervical: No cervical adenopathy.   Skin:     General: Skin is warm and dry.   Neurological:      Mental Status: He is alert and oriented to person, place, and time.      Cranial Nerves: No cranial nerve deficit.      Motor: No weakness.      Comments: There is diminished protective sensation in both feet.    Patellar reflex is absent on the right, 1+ on the left.    Strength in both lower extremities is normal to confrontation.   Psychiatric:         Mood and Affect: Mood normal.         Behavior: Behavior normal.        Physical Exam      Procedures    ACE III MINI        Results  Laboratory Studies  Glucose, kidney function, sodium, potassium are normal. Liver function tests are stable. A1c is normal. " Cholesterol numbers are normal. PSA prostate cancer screening is normal. Uric acid is down to normal. Vitamin D is normal. Blood counts are normal.           Assessment/Plan:    Diagnoses and all orders for this visit:    1. Essential hypertension (Primary)    2. Mixed hyperlipidemia    3. Non-alcoholic fatty liver disease    4. Lumbosacral radiculopathy      Assessment & Plan  1. Hypertension.  Blood pressure is well controlled on losartan and metoprolol.    2. Hyperlipidemia.  Lipids are very well controlled currently without medication. He was intolerant of statins.    3. Nonalcoholic fatty liver disease.  LFTs are stable. The treatment remains control of lipids and glucose and weight loss. Monitoring will continue.    4. Radiating back pain.  MRI is pending. He is experiencing pain radiating down both legs, with severe neuropathy and shooting pain down the left foot. Pain is rated 6-7 throughout the day, increasing to 80 during episodes. Currently on gabapentin 300 mg once in the morning and once at night, which is not effective. He is advised to increase the evening dose to two tablets (600 mg) and maintain one tablet in the morning. The dose can be increased by 300 mg per week if needed. Records from podiatry, including EMG, will be obtained. Imaging of the lumbar spine may be necessary.    5. Health Maintenance.  PSA prostate cancer screening is normal. Uric acid is normal. Vitamin D is normal. Recent evidence suggests that elevated levels of certain tests can be brain protective with methyl folate and methyl B12 instead of plain B12. He is advised to take methyl folate and methyl B12.    Follow-up  Return in 6 months for follow-up.      Plan of care reviewed with patient at the conclusion of today's visit. Education was provided regarding diagnosis, management, and any prescribed or recommended OTC medications.Patient verbalizes understanding of and agreement with management plan.     Patient or patient  representative verbalized consent for the use of Ambient Listening during the visit with  Wilfred Kim MD for chart documentation. 10/2/2024  07:55 EDT        Wilfred Kim MD

## 2024-10-01 LAB — APO B SERPL-MCNC: 70 MG/DL

## 2024-10-06 ENCOUNTER — TELEPHONE (OUTPATIENT)
Dept: INTERNAL MEDICINE | Facility: CLINIC | Age: 64
End: 2024-10-06
Payer: COMMERCIAL

## 2024-10-06 NOTE — TELEPHONE ENCOUNTER
MR RI of the lumbar spine was ordered on 9/20/2024.  Dr. Dominguez and I have been watching for results while Dr. Kim out of town.  Please check on results and let Dr. Kim or [facility]

## 2024-10-16 ENCOUNTER — HOSPITAL ENCOUNTER (OUTPATIENT)
Dept: MRI IMAGING | Facility: HOSPITAL | Age: 64
Discharge: HOME | End: 2024-10-16
Admitting: INTERNAL MEDICINE
Payer: COMMERCIAL

## 2024-10-16 DIAGNOSIS — M54.9 RADIATING BACK PAIN: ICD-10-CM

## 2024-10-16 PROCEDURE — 72148 MRI LUMBAR SPINE W/O DYE: CPT

## 2024-10-17 ENCOUNTER — TELEPHONE (OUTPATIENT)
Dept: INTERNAL MEDICINE | Facility: CLINIC | Age: 64
End: 2024-10-17
Payer: COMMERCIAL

## 2024-10-17 DIAGNOSIS — M48.07 SPINAL STENOSIS OF LUMBOSACRAL REGION: Primary | ICD-10-CM

## 2024-10-17 NOTE — TELEPHONE ENCOUNTER
Please call patient.  I reviewed the MRI of his lumbar spine done yesterday.  It shows degenerative disc disease and facet arthropathy with multilevel canal stenosis worse  at L4-L5.  Also Multilevel lumbar neural foraminal narrowing most prominent at L5-S1.    I have put in a referral to neurosurgery to see Dr. Paul.  He will get a phone call with a date and time from [facility]

## 2024-10-21 RX ORDER — LOSARTAN POTASSIUM 25 MG/1
25 TABLET ORAL DAILY
Qty: 90 TABLET | Refills: 1 | Status: SHIPPED | OUTPATIENT
Start: 2024-10-21

## 2024-10-28 DIAGNOSIS — M54.31 RIGHT SIDED SCIATICA: Primary | ICD-10-CM

## 2024-10-28 RX ORDER — GABAPENTIN 300 MG/1
300 CAPSULE ORAL 2 TIMES DAILY
Qty: 60 CAPSULE | Refills: 2 | Status: SHIPPED | OUTPATIENT
Start: 2024-10-28

## 2024-10-28 NOTE — TELEPHONE ENCOUNTER
Rx Refill Note  Requested Prescriptions     Pending Prescriptions Disp Refills    gabapentin (NEURONTIN) 300 MG capsule       Sig: Take 1 capsule by mouth 2 (Two) Times a Day.      Last office visit with prescribing clinician: 9/30/2024   Last telemedicine visit with prescribing clinician: Visit date not found   Next office visit with prescribing clinician: 4/18/2025     LA: N/A                          Would you like a call back once the refill request has been completed: [] Yes [] No    If the office needs to give you a call back, can they leave a voicemail: [] Yes [] No    Nohemy Swenson LPN  10/28/24, 10:39 EDT

## 2024-11-05 ENCOUNTER — OFFICE VISIT (OUTPATIENT)
Dept: NEUROSURGERY | Facility: CLINIC | Age: 64
End: 2024-11-05
Payer: COMMERCIAL

## 2024-11-05 VITALS — BODY MASS INDEX: 32.9 KG/M2 | HEIGHT: 71 IN | TEMPERATURE: 98 F | WEIGHT: 235 LBS

## 2024-11-05 DIAGNOSIS — M51.9 LUMBAR DISC DISEASE: ICD-10-CM

## 2024-11-05 DIAGNOSIS — G60.9 HEREDITARY AND IDIOPATHIC PERIPHERAL NEUROPATHY: ICD-10-CM

## 2024-11-05 DIAGNOSIS — M54.9 MECHANICAL BACK PAIN: Primary | ICD-10-CM

## 2024-11-05 DIAGNOSIS — M47.819 FACET ARTHROPATHY: ICD-10-CM

## 2024-11-05 PROCEDURE — 99204 OFFICE O/P NEW MOD 45 MIN: CPT | Performed by: NEUROLOGICAL SURGERY

## 2024-11-05 RX ORDER — AMITRIPTYLINE HYDROCHLORIDE 10 MG/1
10 TABLET ORAL
COMMUNITY
Start: 2024-09-04

## 2024-11-05 RX ORDER — GABAPENTIN 300 MG/1
300 CAPSULE ORAL 3 TIMES DAILY
Qty: 90 CAPSULE | Refills: 3 | Status: SHIPPED | OUTPATIENT
Start: 2024-11-05

## 2024-11-05 NOTE — PROGRESS NOTES
Patient: Bala Staley III  : 1960    Primary Care Provider: Wilfred Kim MD    Requesting Provider: As above        History    Chief Complaint: Low back and bilateral foot pain.    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  He has had severe dysesthetic pain involving his feet.  Apparently electrodiagnostic studies were performed which raised the specter of a neuropathy and possibly findings emanating from his back.  Unfortunately, I do not have his nerve test results.  Not long after that he started having low back pain.  He has no radicular spread of his pain.  His pain is not positional.  He takes gabapentin 300 mg twice a day and has done so for couple of months.  He has done physical therapy.  He has some popping and cracking in his neck on the right side.    Review of Systems   Constitutional:  Negative for activity change, appetite change, chills, diaphoresis, fatigue, fever and unexpected weight change.   HENT:  Negative for congestion, dental problem, drooling, ear discharge, ear pain, facial swelling, hearing loss, mouth sores, nosebleeds, postnasal drip, rhinorrhea, sinus pressure, sinus pain, sneezing, sore throat, tinnitus, trouble swallowing and voice change.    Eyes:  Negative for photophobia, pain, discharge, redness, itching and visual disturbance.   Respiratory:  Negative for apnea, cough, choking, chest tightness, shortness of breath, wheezing and stridor.    Cardiovascular:  Negative for chest pain, palpitations and leg swelling.   Gastrointestinal:  Negative for abdominal distention, abdominal pain, anal bleeding, blood in stool, constipation, diarrhea, nausea, rectal pain and vomiting.   Endocrine: Negative for cold intolerance, heat intolerance, polydipsia, polyphagia and polyuria.   Genitourinary:  Negative for decreased urine volume, difficulty urinating, dysuria, enuresis, flank  "pain, frequency, genital sores, hematuria, penile discharge, penile pain, penile swelling, scrotal swelling, testicular pain and urgency.   Musculoskeletal:  Positive for back pain and neck stiffness. Negative for arthralgias, gait problem, joint swelling, myalgias and neck pain.   Skin:  Negative for color change, pallor, rash and wound.   Allergic/Immunologic: Negative for environmental allergies, food allergies and immunocompromised state.   Neurological:  Negative for dizziness, tremors, seizures, syncope, facial asymmetry, speech difficulty, weakness, light-headedness, numbness and headaches.   Hematological:  Negative for adenopathy. Does not bruise/bleed easily.   Psychiatric/Behavioral:  Negative for agitation, behavioral problems, confusion, decreased concentration, dysphoric mood, hallucinations, self-injury, sleep disturbance and suicidal ideas. The patient is not nervous/anxious and is not hyperactive.        The patient's past medical history, past surgical history, family history, and social history have been reviewed at length in the electronic medical record.      Physical Exam:   Temp 98 °F (36.7 °C) (Infrared)   Ht 180.3 cm (71\")   Wt 107 kg (235 lb)   BMI 32.78 kg/m²   CONSTITUTIONAL: Patient is well-nourished, pleasant and appears stated age.  MUSCULOSKELETAL:  Straight leg raising is negative.  Landon's Sign is negative.  ROM in the low back is normal.  Tenderness in the back to palpation is not observed.  He has a stocking distribution swelling in his legs and feet.  NEUROLOGICAL:  Orientation, memory, attention span, language function, and cognition have been examined and are intact.  Strength is intact in the lower extremities to direct testing.  Muscle tone is normal throughout.  Station and gait are normal.  Sensation is intact to light touch testing throughout.  Deep tendon reflexes are 1+ and symmetrical.  Coordination is intact.      Medical Decision Making    Data Review:   (All " imaging studies were personally reviewed unless stated otherwise)  MRI of the lumbar spine dated 10/16/2024 demonstrates some mild diffuse degenerative disc disease and facet arthropathy.  There may be some mild to moderate canal narrowing at L4-5 and L5-S1.  High-grade stenosis is not observed.    Diagnosis:   1.  Mechanical low back pain.  2.  Moderate lumbar stenosis without neurogenic claudication.  3.  Peripheral neuropathy.    Treatment Options:   I bumped up his gabapentin to 300 mg 3 times a day.  If necessary that can be increased to try and ameliorate his feet symptoms.  He does not require surgical intervention on his back.  He will follow-up on an as-needed basis.    Addendum: Electrodiagnostic studies dated 8/30/2024 demonstrate an electrophysiologically right L5 radiculopathy and left S1 radiculopathy.  There was said to be no evidence of a myelopathy or generalized polyneuropathy.  The patient's current complaints are not suggestive of a radiculopathy.  Despite the EMG findings he certainly may harbor a small fiber neuropathy.      Scribed for Mulugeta Paul MD by Felipa Izquierdo CMA on 11/5/2024 11:57 EST       I, Dr. Paul, personally performed the services described in the documentation, as scribed in my presence, and it is both accurate and complete.

## 2024-11-07 ENCOUNTER — PATIENT ROUNDING (BHMG ONLY) (OUTPATIENT)
Dept: NEUROSURGERY | Facility: CLINIC | Age: 64
End: 2024-11-07
Payer: COMMERCIAL

## 2024-11-07 NOTE — PROGRESS NOTES
A THANK YOU LETTER HAS BEEN MAILED TO THE PATIENT FOR PATIENT ROUNDING.   Patient/Caregiver provided printed discharge information.

## 2024-11-21 DIAGNOSIS — G60.9 HEREDITARY AND IDIOPATHIC PERIPHERAL NEUROPATHY: ICD-10-CM

## 2024-11-21 RX ORDER — GABAPENTIN 300 MG/1
300 CAPSULE ORAL 3 TIMES DAILY
Qty: 90 CAPSULE | Refills: 3 | Status: CANCELLED | OUTPATIENT
Start: 2024-11-21

## 2024-11-21 RX ORDER — ERGOCALCIFEROL 1.25 MG/1
50000 CAPSULE, LIQUID FILLED ORAL
Qty: 5 CAPSULE | Refills: 5 | Status: SHIPPED | OUTPATIENT
Start: 2024-11-21

## 2024-11-21 NOTE — TELEPHONE ENCOUNTER
"Caller: LuísBala III \"Mueller\"    Relationship: Self    Best call back number: [phone]     Requested Prescriptions:   Requested Prescriptions     Pending Prescriptions Disp Refills    gabapentin (NEURONTIN) 300 MG capsule 90 capsule 3     Sig: Take 1 capsule by mouth 3 (Three) Times a Day.    vitamin D (ERGOCALCIFEROL) 1.25 MG (79230 UT) capsule capsule 5 capsule 5     Sig: Take 1 capsule by mouth Every 7 (Seven) Days.        Pharmacy where request should be sent: [facility]/PH[facility]ACY #[id number] - [place], [place] - [address]  - [phone]  - [phone] FX     Last office visit with prescribing clinician: 9/30/2024   Last telemedicine visit with prescribing clinician: Visit date not found   Next office visit with prescribing clinician: 4/18/2025     Additional details provided by patient: PATIENT HAS CALLED REQUESTING REFILLS ON ABOVE MEDICATIONS.     Does the patient have less than a 3 day supply:  [x] Yes  [] No    Would you like a call back once the refill request has been completed: [] Yes [x] No    If the office needs to give you a call back, can they leave a voicemail: [] Yes [x] No    Sarah Roth   11/21/24 16:16 EST         "

## 2024-12-05 ENCOUNTER — TELEPHONE (OUTPATIENT)
Dept: INTERNAL MEDICINE | Facility: CLINIC | Age: 64
End: 2024-12-05

## 2024-12-05 RX ORDER — ATORVASTATIN CALCIUM 40 MG/1
40 TABLET, FILM COATED ORAL DAILY
Qty: 90 TABLET | Refills: 3 | Status: SHIPPED | OUTPATIENT
Start: 2024-12-05

## 2024-12-05 NOTE — TELEPHONE ENCOUNTER
"Caller: Bala Staley III \"Mueller\"    Relationship: Self    Best call back number: [phone]     Requested Prescriptions:   Requested Prescriptions      No prescriptions requested or ordered in this encounter    [facility]    Pharmacy where request should be sent: [facility]/PHARMACY #[id number] - [place], [place] - [address] - [phone]  - [phone] FX     Last office visit with prescribing clinician: 9/30/2024   Last telemedicine visit with prescribing clinician: Visit date not found   Next office visit with prescribing clinician: 4/18/2025     Additional details provided by patient: PATIENT IS OUT OF MEDICATION AND IT IS NOT ON HIS MEDICATION LIST. IF HE IS NOT SUPPOSED TO BE ON THE MEDICATION PLEASE LET HIM KNOW.    Does the patient have less than a 3 day supply:  [] Yes  [] No    Would you like a call back once the refill request has been completed: [] Yes [] No    If the office needs to give you a call back, can they leave a voicemail: [] Yes [] No    Brooke Navarro Rep   12/05/24 10:25 EST       "

## 2025-01-02 RX ORDER — FUROSEMIDE 20 MG/1
TABLET ORAL
Qty: 180 TABLET | Refills: 1 | Status: SHIPPED | OUTPATIENT
Start: 2025-01-02

## 2025-01-14 RX ORDER — ALLOPURINOL 100 MG/1
TABLET ORAL
Qty: 90 TABLET | Refills: 1 | Status: SHIPPED | OUTPATIENT
Start: 2025-01-14

## 2025-01-14 NOTE — TELEPHONE ENCOUNTER
Rx Refill Note  Requested Prescriptions     Pending Prescriptions Disp Refills    allopurinol (ZYLOPRIM) 100 MG tablet [Pharmacy Med Name: ALLOPURINOL 100 MG TABLET] 90 tablet 1     Sig: TAKE 1 TABLET BY MOUTH EVERY DAY      Last office visit with prescribing clinician: 9/30/2024   Last telemedicine visit with prescribing clinician: Visit date not found   Next office visit with prescribing clinician: 4/18/2025                         Would you like a call back once the refill request has been completed: [] Yes [] No    If the office needs to give you a call back, can they leave a voicemail: [] Yes [] No    Layne Salazar MA  01/14/25, 08:35 EST

## 2025-01-15 ENCOUNTER — OFFICE VISIT (OUTPATIENT)
Dept: INTERNAL MEDICINE | Facility: CLINIC | Age: 65
End: 2025-01-15
Payer: COMMERCIAL

## 2025-01-15 VITALS
HEART RATE: 100 BPM | WEIGHT: 237 LBS | SYSTOLIC BLOOD PRESSURE: 140 MMHG | HEIGHT: 71 IN | DIASTOLIC BLOOD PRESSURE: 80 MMHG | BODY MASS INDEX: 33.18 KG/M2 | OXYGEN SATURATION: 99 %

## 2025-01-15 DIAGNOSIS — N41.0 ACUTE PROSTATITIS: ICD-10-CM

## 2025-01-15 DIAGNOSIS — R30.0 DYSURIA: Primary | ICD-10-CM

## 2025-01-15 DIAGNOSIS — R39.198 DIFFICULTY VOIDING: ICD-10-CM

## 2025-01-15 DIAGNOSIS — N30.01 ACUTE CYSTITIS WITH HEMATURIA: ICD-10-CM

## 2025-01-15 LAB
BILIRUB BLD-MCNC: ABNORMAL MG/DL
CLARITY, POC: ABNORMAL
COLOR UR: ABNORMAL
EXPIRATION DATE: ABNORMAL
GLUCOSE UR STRIP-MCNC: NEGATIVE MG/DL
KETONES UR QL: ABNORMAL
LEUKOCYTE EST, POC: ABNORMAL
Lab: ABNORMAL
NITRITE UR-MCNC: POSITIVE MG/ML
PH UR: 6.5 [PH] (ref 5–8)
PROT UR STRIP-MCNC: ABNORMAL MG/DL
RBC # UR STRIP: ABNORMAL /UL
SP GR UR: 1.02 (ref 1–1.03)
UROBILINOGEN UR QL: ABNORMAL

## 2025-01-15 PROCEDURE — 87086 URINE CULTURE/COLONY COUNT: CPT | Performed by: INTERNAL MEDICINE

## 2025-01-15 PROCEDURE — 87088 URINE BACTERIA CULTURE: CPT | Performed by: INTERNAL MEDICINE

## 2025-01-15 PROCEDURE — 99213 OFFICE O/P EST LOW 20 MIN: CPT | Performed by: INTERNAL MEDICINE

## 2025-01-15 PROCEDURE — 87186 SC STD MICRODIL/AGAR DIL: CPT | Performed by: INTERNAL MEDICINE

## 2025-01-15 PROCEDURE — 81003 URINALYSIS AUTO W/O SCOPE: CPT | Performed by: INTERNAL MEDICINE

## 2025-01-15 RX ORDER — CIPROFLOXACIN 500 MG/1
500 TABLET, FILM COATED ORAL 2 TIMES DAILY
Qty: 20 TABLET | Refills: 0 | Status: SHIPPED | OUTPATIENT
Start: 2025-01-15

## 2025-01-15 NOTE — PATIENT INSTRUCTIONS
POC urinalysis abnormal for blood and white cells  Treat prostate infection and urinary tract infection with ciprofloxacin 500 twice daily for 10 days  Continue all other medications  Encouraged to not take antihistamine or decongestant  Return visit to see PCP Dr. Kim in 1 week

## 2025-01-15 NOTE — ASSESSMENT & PLAN NOTE
Urine culture sensitivity pending  Ciprofloxacin 500 twice daily for 10 days  Return visit to see Dr. Kim in 1 week

## 2025-01-15 NOTE — ASSESSMENT & PLAN NOTE
POC urinalysis is abnormal with bilirubin blood and white cells  We will send for culture and sensitivity  Prostate is enlarged tender  And will treat with ciprofloxacin 500 twice daily for 10 days

## 2025-01-15 NOTE — PROGRESS NOTES
[facility]     Bala BOONE [facility]  1960   [id number]      Patient Care Team:  Wilfred Kim MD as PCP - General (Internal Medicine)  Pawel Leahy MD as Consulting Physician (Cardiology)    Chief Complaint::   Chief Complaint   Patient presents with    Difficulty Urinating     X 24 hours.  Some blood noted on underwear            HPI  History of Present Illness  The patient is a 64-year-old male who presents for evaluation of difficulty urinating for the past 24 hours with blood and an abnormal POC urinalysis showing moderate blood, moderate bilirubin, nitrate positive, and a large number of leukocytes.    He reports experiencing dysuria since yesterday, accompanied by hematuria. He has observed blood in his underwear but not in his urine. His urine appears dehydrated. He has no history of nephrolithiasis or urinary tract infections. He has not been on any decongestants or antihistamines recently and has not made any changes to his medication regimen. He also reports intermittent lower back pain and perineal discomfort during urination. He has no history of passing renal calculi or blood clots in his urine. He has no history of prostatitis. He experienced a fever the previous night. He has no testicular pain or soreness and has never consulted a urologist or nephrologist. He has no history of headaches, dizziness, dysphagia, or wheezing. He had a mild cough 3 weeks ago, which he managed with NyQuil.    Supplemental Information  He had surgery on his left hand and can not shoot a shotgun or fix things. They took a bone out because it was grinding. He has no strength in it. He will try to strengthen it. His signature is worse than it ever had been.    FAMILY HISTORY  His mother used to get urinary tract infections frequently.    ALLERGIES  The patient is allergic to ECHO DYE.    MEDICATIONS  Current: allopurinol, hydrochlorothiazide, atorvastatin, gabapentin, losartan, metoprolol,  amitriptyline      Patient Active Problem List   Diagnosis    Essential hypertension    Mixed hyperlipidemia    Class 2 obesity due to excess calories without serious comorbidity in adult    Reactive depression    Gastroesophageal reflux disease without esophagitis    Vitamin D deficiency    Chronic low back pain    Dyspnea on exertion    Syncope, tussive    Hypokalemia    Cough syncope    Allergic rhinitis    Cough    Iron deficiency anemia    Vasovagal syncope    Hernia of abdominal cavity    Right sided sciatica    Non-alcoholic fatty liver disease    Acute gout involving toe of left foot    Idiopathic chronic gout of multiple sites without tophus    Difficulty voiding    Acute cystitis with hematuria    Acute prostatitis        Past Medical History:   Diagnosis Date    Anemia     iron deficiency    Arthritis of back 2003    Chronic low back pain 03/14/2018    Colonic polyp     Difficulty walking 2022    ballence issue    Difficulty walking     Dizziness     ED (erectile dysfunction)     Environmental allergies     Esophagitis     Essential hypertension 03/14/2018    Gastroesophageal reflux disease without esophagitis 03/14/2018    Gout     Hernia of abdominal cavity     Hip arthrosis 2003    HL (hearing loss) 2010    left ear    Hyperlipidemia     Hypertension     Low back strain     off and on    Periarthritis of shoulder 2020    Reactive depression 03/14/2018    Rotator cuff syndrome 2020    Tennis elbow     Vasovagal syncope 02/13/2019    Vitamin D deficiency 03/14/2018    Weakness        Past Surgical History:   Procedure Laterality Date    APPENDECTOMY      CARDIAC CATHETERIZATION  2011    COLONOSCOPY      ELBOW ARTHROPLASTY      Right     ELBOW PROCEDURE  2003    ENDOSCOPY  2011    ENDOSCOPY  2008    with biopsy    ESOPHAGEAL MOTILITY STUDY N/A 06/27/2019    Procedure: MANOMETRY;  Surgeon: Mark Lowery MD;  Location: [facility] ENDOSCOPY;  Service: Gastroenterology    FOOT SURGERY      left foot     HAND  "SURGERY  2022    UPPER GASTROINTESTINAL ENDOSCOPY      WRIST SURGERY  2022       Family History   Problem Relation Age of Onset    Alzheimer's disease Mother     Dementia Mother     Hypertension Mother     High cholesterol Mother     Colon polyps Father     Heart disease Father     Heart attack Father     Colon cancer Father     Coronary artery disease Father     Lung disease Sister     No Known Problems Maternal Grandmother     Alzheimer's disease Maternal Grandfather     Cancer Paternal Grandmother     Lung cancer Paternal Grandmother     Heart disease Paternal Grandfather     Heart disease Son     Esophageal cancer Neg Hx        Social History     Socioeconomic History    Marital status:    Tobacco Use    Smoking status: Never     Passive exposure: Never    Smokeless tobacco: Former     Types: Chew     Quit date: 2002   Vaping Use    Vaping status: Never Used   Substance and Sexual Activity    Alcohol use: Yes     Alcohol/week: 14.0 standard drinks of alcohol     Types: 14 Shots of liquor per week     Comment: per day, [place]     Drug use: No    Sexual activity: Yes     Partners: Female     Birth control/protection: None       Allergies   Allergen Reactions    Contrast Dye (Echo Or Unknown Ct/Mr) Rash       Review of Systems     HEENT: Denies headache or dizzy  NECK: Denies dysphagia  CHEST: Denies cough or wheeze  CARDIAC: Denies chest pain or pressure  ABD: Denies nausea vomiting  : Complains of dysuria and fever and hematuria  NEURO: Denies syncope  PSYCH: Denies anxiety  EXTREM: Complains of right wrist lack of strength following surgery  SKIN: Denies rash    Vital Signs  Vitals:    01/15/25 0955   BP: 140/80   BP Location: Left arm   Patient Position: Sitting   Cuff Size: Adult   Pulse: 100   SpO2: 99%   Weight: 108 kg (237 lb)   Height: 180.3 cm (71\")   PainSc: 0-No pain     Body mass index is 33.05 kg/m².  BMI is >= 30 and <35. (Class 1 Obesity). The following options were offered after " discussion;: nutrition counseling/recommendations     Advance Care Planning         Current Outpatient Medications:     allopurinol (ZYLOPRIM) 100 MG tablet, TAKE 1 TABLET BY MOUTH EVERY DAY, Disp: 90 tablet, Rfl: 1    amitriptyline (ELAVIL) 10 MG tablet, Take 1 tablet by mouth every night at bedtime., Disp: , Rfl:     ASCORBIC ACID PO, Take 1 tablet by mouth Daily., Disp: , Rfl:     atorvastatin (Lipitor) 40 MG tablet, Take 1 tablet by mouth Daily., Disp: 90 tablet, Rfl: 3    colchicine 0.6 MG tablet, TAKE 1 TABLET BY MOUTH EVERY DAY, Disp: 90 tablet, Rfl: 1    [facility] Aspirin Low Dose 81 MG EC tablet, TAKE 1 TABLET BY MOUTH EVERY DAY, Disp: 90 tablet, Rfl: 3    esomeprazole (NexIUM) 40 MG packet, Take 1 capsule by mouth Every Night., Disp: , Rfl:     ferrous sulfate 325 (65 FE) MG EC tablet, Take 1 tablet by mouth Daily With Breakfast., Disp: 90 tablet, Rfl: 1    folic acid-vit B6-vit B12 (FOLGARD) 2.2-25-1 MG tablet tablet, Take  by mouth Daily., Disp: , Rfl:     furosemide (LASIX) 20 MG tablet, TAKE 1 TABLET BY MOUTH TWICE A DAY, Disp: 180 tablet, Rfl: 1    gabapentin (NEURONTIN) 300 MG capsule, Take 1 capsule by mouth 3 (Three) Times a Day., Disp: 90 capsule, Rfl: 3    Klor-Con M20 20 MEQ CR tablet, TAKE 1 TABLET BY MOUTH EVERY DAY, Disp: 90 tablet, Rfl: 1    losartan (COZAAR) 25 MG tablet, TAKE 1 TABLET BY MOUTH EVERY DAY, Disp: 90 tablet, Rfl: 1    MAGNESIUM OXIDE PO, Take 250 mg by mouth Daily., Disp: , Rfl:     metoprolol succinate XL (TOPROL-XL) 25 MG 24 hr tablet, TAKE 1 TABLET BY MOUTH EVERY DAY, Disp: 90 tablet, Rfl: 1    spironolactone (ALDACTONE) 25 MG tablet, Take 1 tablet by mouth Daily., Disp: 90 tablet, Rfl: 3    vitamin B-12 (CYANOCOBALAMIN) 1000 MCG tablet, Take 1 tablet by mouth Daily., Disp: , Rfl:     vitamin D (ERGOCALCIFEROL) 1.25 MG (49482 UT) capsule capsule, Take 1 capsule by mouth Every 7 (Seven) Days., Disp: 5 capsule, Rfl: 5    ciprofloxacin (Cipro) 500 MG tablet, Take 1 tablet by mouth 2  (Two) Times a Day., Disp: 20 tablet, Rfl: 0    Physical Exam     ACE III MINI        Physical Exam  Physical Exam  The prostate is slightly enlarged and tender.  HEENT: No asymmetry    : Rectal exam prostate enlarged and tender     Results Review:    Recent Results (from the past 4 weeks)   POC Urinalysis Dipstick, Automated    Collection Time: 01/15/25 10:07 AM    Specimen: Urine   Result Value Ref Range    Color Other (A) Yellow, Straw, Dark Yellow, Linda    Clarity, UA Slightly Cloudy (A) Clear    Specific Gravity  1.025 1.005 - 1.030    pH, Urine 6.5 5.0 - 8.0    Leukocytes Large (3+) (A) Negative    Nitrite, UA Positive (A) Negative    Protein,  mg/dL (A) Negative mg/dL    Glucose, UA Negative Negative mg/dL    Ketones, UA >80 mg/dL (A) Negative    Urobilinogen, UA 4 E.U./dL (A) Normal, 0.2 E.U./dL    Bilirubin Moderate (2+) (A) Negative    Blood, UA Large (A) Negative    Lot Number [id number]     Expiration Date 7,312,025        Procedures POC urinalysis abnormal for bilirubin blood and white cells    Medication Review: Medications reviewed and noted    Patient wellness counseling  Exercise: Continue ADL  Diet: Healthy cardiac diet  Screening: POC urinalysis abnormal Sent for culture and sensitivity  Social History     Socioeconomic History    Marital status:    Tobacco Use    Smoking status: Never     Passive exposure: Never    Smokeless tobacco: Former     Types: Chew     Quit date: 2002   Vaping Use    Vaping status: Never Used   Substance and Sexual Activity    Alcohol use: Yes     Alcohol/week: 14.0 standard drinks of alcohol     Types: 14 Shots of liquor per week     Comment: per day, [place]     Drug use: No    Sexual activity: Yes     Partners: Female     Birth control/protection: None        Assessment/Plan:    Assessment & Plan  1. Urinary Tract Infection.  The presence of blood, bilirubin, and white cells in the urine indicates a urinary tract infection. A urine culture will be sent to  the lab to identify the causative organism and determine the most effective antibiotic. Ciprofloxacin will be prescribed, with instructions to take 2 doses today (one upon receipt and one before bed), followed by a regimen of 1 dose in the morning and 1 in the afternoon for the subsequent 10 days. He is advised to avoid medications containing decongestants or antihistamines during this treatment period. Potential side effects, including joint tendon soreness, have been discussed. If he experiences any discomfort, he should discontinue the medication and inform the office immediately. If the urine culture results necessitate a change in antibiotic therapy, he will be contacted.    2. Prostatitis.  The prostate examination revealed tenderness and enlargement, consistent with prostatitis. The ciprofloxacin prescribed for the urinary tract infection will also address the prostate infection. He is advised to monitor for improvement within a day or two and report any lack of improvement.    Follow-up  The patient will follow up with Dr. King next week.    Problem List Items Addressed This Visit          Genitourinary and Reproductive     Difficulty voiding    Overview     Patient has noted dysuria in the past 24 hours with associated blood, low-grade fever last night denies chills  Denies previous problems or prostate infection or urinary tract infection and denies kidney stones his last PSA was normal         Current Assessment & Plan     POC urinalysis is abnormal with bilirubin blood and white cells  We will send for culture and sensitivity  Prostate is enlarged tender  And will treat with ciprofloxacin 500 twice daily for 10 days         Acute cystitis with hematuria    Overview     Complains of dysuria and hematuria in the last 24 hours with low-grade fever last p.m. no chills  POC urinalysis abnormal blood and white cells  Will send urine for culture and sensitivity  Prostate is enlarged and tender to exam  Will  treat with ciprofloxacin 500 twice daily for 10 days         Current Assessment & Plan     Ciprofloxacin 500 twice daily for 10 days and follow-up with patient's PCP Dr. Kim in 1 week         Acute prostatitis    Overview     Patient complains of dysuria and hematuria  POC urinalysis abnormal for blood and white cells will send for culture and sensitivity  Complains of dysuria denies history of kidney stones does not have nocturia with last PSA normal and has not been taking antihistamine decongestant in the recent past.  Prostate exam tenderness and enlargement with feeling of void with prostate exam consistent with prostatitis           Current Assessment & Plan     Urine culture sensitivity pending  Ciprofloxacin 500 twice daily for 10 days  Return visit to see Dr. Kim in 1 week          Other Visit Diagnoses       Dysuria    -  Primary    Relevant Orders    POC Urinalysis Dipstick, Automated (Completed)    Urine Culture - Urine, Urine, Clean Catch             Patient Instructions   POC urinalysis abnormal for blood and white cells  Treat prostate infection and urinary tract infection with ciprofloxacin 500 twice daily for 10 days  Continue all other medications  Encouraged to not take antihistamine or decongestant  Return visit to see PCP Dr. Kim in 1 week     Plan of care reviewed with patient at the conclusion of today's visit. Education was provided regarding diagnosis, management, and any prescribed or recommended OTC medications.Patient verbalizes understanding of and agreement with management plan.           01/15/25   10:06 EST    Patient or patient representative verbalized consent for the use of Ambient Listening during the visit with  Randy De Souza MD for chart documentation. 1/15/2025  10:42 EST

## 2025-01-18 LAB — BACTERIA SPEC AEROBE CULT: ABNORMAL

## 2025-01-23 ENCOUNTER — OFFICE VISIT (OUTPATIENT)
Dept: INTERNAL MEDICINE | Facility: CLINIC | Age: 65
End: 2025-01-23
Payer: COMMERCIAL

## 2025-01-23 ENCOUNTER — HOSPITAL ENCOUNTER (OUTPATIENT)
Dept: GENERAL RADIOLOGY | Facility: HOSPITAL | Age: 65
Discharge: HOME | End: 2025-01-23
Admitting: INTERNAL MEDICINE
Payer: COMMERCIAL

## 2025-01-23 VITALS
SYSTOLIC BLOOD PRESSURE: 130 MMHG | TEMPERATURE: 97.8 F | WEIGHT: 237.8 LBS | OXYGEN SATURATION: 100 % | BODY MASS INDEX: 33.17 KG/M2 | DIASTOLIC BLOOD PRESSURE: 72 MMHG | HEART RATE: 91 BPM

## 2025-01-23 DIAGNOSIS — M54.2 NECK PAIN: ICD-10-CM

## 2025-01-23 DIAGNOSIS — G60.9 HEREDITARY AND IDIOPATHIC PERIPHERAL NEUROPATHY: ICD-10-CM

## 2025-01-23 DIAGNOSIS — N41.0 ACUTE PROSTATITIS: Primary | ICD-10-CM

## 2025-01-23 PROCEDURE — 72040 X-RAY EXAM NECK SPINE 2-3 VW: CPT

## 2025-01-23 PROCEDURE — 99214 OFFICE O/P EST MOD 30 MIN: CPT | Performed by: INTERNAL MEDICINE

## 2025-01-23 RX ORDER — GABAPENTIN 300 MG/1
CAPSULE ORAL
Qty: 150 CAPSULE | Refills: 3 | Status: SHIPPED | OUTPATIENT
Start: 2025-01-23

## 2025-01-23 NOTE — PROGRESS NOTES
[facility]     Bala MELY [facility]  1960   [id number]      Patient Care Team:  Wilfred Kim MD as PCP - General (Internal Medicine)  Pawel Leahy MD as Consulting Physician (Cardiology)    Chief Complaint::   Chief Complaint   Patient presents with    Primary Care Follow-Up    Hyperlipidemia    Hypertension        HPI  History of Present Illness  The patient is a 64-year-old male who comes in for follow-up of prostatitis, peripheral neuropathy, neck pain, and weight management.    He reports an improvement in his prostatitis symptoms following the treatment regimen prescribed by Dr. De Souza, which included a 10-day course of medication. He expresses concern about the potential need for a repeat PSA test due to his recent prostatitis diagnosis. He also questions the possibility of developing kidney stones as a result of this condition. He maintains a high water intake until 6 PM daily. He recalls experiencing urinary frequency up to 16 times per night, accompanied by pain, prior to starting his current medication. However, he notes that these symptoms have significantly improved since initiating treatment.    He experiences persistent foot pain, which is only alleviated during sleep. He has been under the care of Dr. Umaña, a podiatrist, and has undergone an MRI scan. He reports that his current medication regimen, which includes gabapentin, appears to be effective in managing his symptoms. However, he expresses frustration with the pharmacy's policy of requiring him to consume all his pills before renewing his prescription, a process that typically takes 3 to 4 days, leaving him without medication in the interim. He also reports an episode of daytime sleepiness yesterday, which he attributes to the gabapentin. He does not anticipate achieving complete pain relief but believes the medication has been beneficial in reducing the severity of his symptoms. He is currently taking gabapentin  three times daily and prefers to increase the nighttime dose due to its sedative effects. He describes his morning routine as particularly challenging, requiring him to sit on the edge of the bed and manually stimulate blood flow to his feet, a process that is painful but necessary before he can stand and walk on the carpet.    He reports experiencing neck pain, characterized by a snapping sensation that is audible to others in the room. This is accompanied by pain upon head movement. He does not report any tenderness in the area but notes that massage provides some relief. He expresses interest in pursuing physical therapy for his neck symptoms.    He reports initial weight loss following the initiation of diuretic therapy but notes a recent trend towards weight gain and swelling. He is currently taking two diuretics and is seeking advice on how to manage his weight effectively.    MEDICATIONS  Current: gabapentin, furosemide      Chronic Conditions:      Patient Active Problem List   Diagnosis    Essential hypertension    Mixed hyperlipidemia    Class 2 obesity due to excess calories without serious comorbidity in adult    Reactive depression    Gastroesophageal reflux disease without esophagitis    Vitamin D deficiency    Chronic low back pain    Dyspnea on exertion    Syncope, tussive    Hypokalemia    Cough syncope    Allergic rhinitis    Cough    Iron deficiency anemia    Vasovagal syncope    Hernia of abdominal cavity    Right sided sciatica    Non-alcoholic fatty liver disease    Acute gout involving toe of left foot    Idiopathic chronic gout of multiple sites without tophus    Difficulty voiding    Acute cystitis with hematuria    Acute prostatitis        Past Medical History:   Diagnosis Date    Anemia     iron deficiency    Arthritis of back 2003    Chronic low back pain 03/14/2018    Colonic polyp     Difficulty walking 2022    ballence issue    Difficulty walking     Dizziness     ED (erectile  dysfunction)     Environmental allergies     Esophagitis     Essential hypertension 03/14/2018    Gastroesophageal reflux disease without esophagitis 03/14/2018    Gout     Hernia of abdominal cavity     Hip arthrosis 2003    HL (hearing loss) 2010    left ear    Hyperlipidemia     Hypertension     Low back strain     off and on    Periarthritis of shoulder 2020    Reactive depression 03/14/2018    Rotator cuff syndrome 2020    Tennis elbow     Vasovagal syncope 02/13/2019    Vitamin D deficiency 03/14/2018    Weakness        Past Surgical History:   Procedure Laterality Date    APPENDECTOMY      CARDIAC CATHETERIZATION  2011    COLONOSCOPY      ELBOW ARTHROPLASTY      Right     ELBOW PROCEDURE  2003    ENDOSCOPY  2011    ENDOSCOPY  2008    with biopsy    ESOPHAGEAL MOTILITY STUDY N/A 06/27/2019    Procedure: MANOMETRY;  Surgeon: Mark Lowery MD;  Location: [facility] ENDOSCOPY;  Service: Gastroenterology    FOOT SURGERY      left foot     HAND SURGERY  2022    UPPER GASTROINTESTINAL ENDOSCOPY      WRIST SURGERY  2022       Family History   Problem Relation Age of Onset    Alzheimer's disease Mother     Dementia Mother     Hypertension Mother     High cholesterol Mother     Colon polyps Father     Heart disease Father     Heart attack Father     Colon cancer Father     Coronary artery disease Father     Lung disease Sister     No Known Problems Maternal Grandmother     Alzheimer's disease Maternal Grandfather     Cancer Paternal Grandmother     Lung cancer Paternal Grandmother     Heart disease Paternal Grandfather     Heart disease Son     Esophageal cancer Neg Hx        Social History     Socioeconomic History    Marital status:    Tobacco Use    Smoking status: Never     Passive exposure: Never    Smokeless tobacco: Former     Types: Chew     Quit date: 2002   Vaping Use    Vaping status: Never Used   Substance and Sexual Activity    Alcohol use: Yes     Alcohol/week: 14.0 standard drinks of alcohol      Types: 14 Shots of liquor per week     Comment: per day, [place]     Drug use: No    Sexual activity: Yes     Partners: Female     Birth control/protection: None       Allergies   Allergen Reactions    Contrast Dye (Echo Or Unknown Ct/Mr) Rash         Current Outpatient Medications:     allopurinol (ZYLOPRIM) 100 MG tablet, TAKE 1 TABLET BY MOUTH EVERY DAY, Disp: 90 tablet, Rfl: 1    amitriptyline (ELAVIL) 10 MG tablet, Take 1 tablet by mouth every night at bedtime., Disp: , Rfl:     ASCORBIC ACID PO, Take 1 tablet by mouth Daily., Disp: , Rfl:     atorvastatin (Lipitor) 40 MG tablet, Take 1 tablet by mouth Daily., Disp: 90 tablet, Rfl: 3    ciprofloxacin (Cipro) 500 MG tablet, Take 1 tablet by mouth 2 (Two) Times a Day., Disp: 20 tablet, Rfl: 0    colchicine 0.6 MG tablet, TAKE 1 TABLET BY MOUTH EVERY DAY, Disp: 90 tablet, Rfl: 1    [facility] Aspirin Low Dose 81 MG EC tablet, TAKE 1 TABLET BY MOUTH EVERY DAY, Disp: 90 tablet, Rfl: 3    esomeprazole (NexIUM) 40 MG packet, Take 1 capsule by mouth Every Night., Disp: , Rfl:     ferrous sulfate 325 (65 FE) MG EC tablet, Take 1 tablet by mouth Daily With Breakfast., Disp: 90 tablet, Rfl: 1    folic acid-vit B6-vit B12 (FOLGARD) 2.2-25-1 MG tablet tablet, Take  by mouth Daily., Disp: , Rfl:     furosemide (LASIX) 20 MG tablet, TAKE 1 TABLET BY MOUTH TWICE A DAY, Disp: 180 tablet, Rfl: 1    gabapentin (NEURONTIN) 300 MG capsule, Take 2 tablets in the morning, 1 midday, 2 at bedtime, Disp: 150 capsule, Rfl: 3    Klor-Con M20 20 MEQ CR tablet, TAKE 1 TABLET BY MOUTH EVERY DAY, Disp: 90 tablet, Rfl: 1    losartan (COZAAR) 25 MG tablet, TAKE 1 TABLET BY MOUTH EVERY DAY, Disp: 90 tablet, Rfl: 1    MAGNESIUM OXIDE PO, Take 250 mg by mouth Daily., Disp: , Rfl:     metoprolol succinate XL (TOPROL-XL) 25 MG 24 hr tablet, TAKE 1 TABLET BY MOUTH EVERY DAY, Disp: 90 tablet, Rfl: 1    spironolactone (ALDACTONE) 25 MG tablet, Take 1 tablet by mouth Daily., Disp: 90 tablet, Rfl: 3    vitamin  B-12 (CYANOCOBALAMIN) 1000 MCG tablet, Take 1 tablet by mouth Daily., Disp: , Rfl:     vitamin D (ERGOCALCIFEROL) 1.25 MG (35167 UT) capsule capsule, Take 1 capsule by mouth Every 7 (Seven) Days., Disp: 5 capsule, Rfl: 5    Review of Systems   Constitutional: Negative.    Respiratory: Negative.  Negative for chest tightness and shortness of breath.    Cardiovascular: Negative.  Negative for chest pain.   Gastrointestinal:  Negative for abdominal pain, blood in stool, constipation and diarrhea.        Vital Signs  Vitals:    01/23/25 1056   BP: 130/72   BP Location: Left leg   Patient Position: Sitting   Cuff Size: Adult   Pulse: 91   Temp: 97.8 °F (36.6 °C)   TempSrc: Infrared   SpO2: 100%   Weight: 108 kg (237 lb 12.8 oz)   PainSc:   6   PainLoc: Foot  Comment: Both Sides       Physical Exam  Vitals reviewed.   Constitutional:       Appearance: He is well-developed.   HENT:      Head: Normocephalic and atraumatic.   Cardiovascular:      Rate and Rhythm: Normal rate and regular rhythm.      Heart sounds: Normal heart sounds. No murmur heard.  Pulmonary:      Effort: Pulmonary effort is normal.      Breath sounds: Normal breath sounds.   Musculoskeletal:      Cervical back: Normal range of motion and neck supple.   Neurological:      Mental Status: He is alert and oriented to person, place, and time.        Physical Exam      Procedures    ACE III MINI        Results  Laboratory Studies  PSA was within normal limits in September. Urine test showed blood and infection.    Imaging  MRI showed moderate spinal stenosis.    Testing  EMG test suggested pinched nerves in a couple of places.           Assessment/Plan:    Diagnoses and all orders for this visit:    1. Acute prostatitis (Primary)    2. Hereditary and idiopathic peripheral neuropathy  -     gabapentin (NEURONTIN) 300 MG capsule; Take 2 tablets in the morning, 1 midday, 2 at bedtime  Dispense: 150 capsule; Refill: 3    3. Neck pain  -     XR Spine Cervical 2 or  3 View; Future  -     Ambulatory Referral to Physical Therapy for Evaluation & Treatment      Assessment & Plan  1. Acute prostatitis.  His symptoms have shown significant improvement with the administration of ciprofloxacin. A 10-day course of ciprofloxacin has been prescribed, which he will complete. A repeat PSA test is not recommended at this time due to the potential for elevated levels caused by the recent prostatitis diagnosis. The presence of blood in the urine is consistent with the infection, and there is no indication of ketones, suggesting adequate hydration and nutrition. The urine abnormalities are likely attributable to the prostatitis.    2. Peripheral neuropathy.  The EMG results suggest the possibility of radiculopathy; however, the neurosurgical evaluation did not reveal any corresponding lesion on the MRI. He does present with moderate spinal stenosis. Gabapentin has been recommended and has proven effective in alleviating discomfort, although he continues to experience persistent pain throughout the day. The gabapentin dosage will be increased by 300 mg, with an additional 600 mg to be taken in the evening. The prescription will be adjusted to accommodate this change, with instructions for 2 pills in the morning, 1 midday, and 2 at night, but he will initially take 1 in the morning, 1 midday, and 2 at night. If drowsiness occurs, the dosage will be adjusted accordingly.    3. Neck pain.  The etiology of the pain is likely muscular in nature. The EMG did not indicate any cervical spine disease. A cervical spine x-ray will be obtained to rule out arthritis. He has been referred to physical therapy for further management.    4. Weight management.  He has been advised to temporarily double his furosemide dosage for a few days, then revert to the original dosage. If weight loss is observed, he may repeat the temporary increase as needed.      Plan of care reviewed with patient at the conclusion of  today's visit. Education was provided regarding diagnosis, management, and any prescribed or recommended OTC medications.Patient verbalizes understanding of and agreement with management plan.     Patient or patient representative verbalized consent for the use of Ambient Listening during the visit with  Wilfred Kim MD for chart documentation. 1/29/2025  17:19 STONEY Kim MD

## 2025-02-11 ENCOUNTER — TELEPHONE (OUTPATIENT)
Dept: INTERNAL MEDICINE | Facility: CLINIC | Age: 65
End: 2025-02-11

## 2025-02-11 DIAGNOSIS — G62.9 PERIPHERAL POLYNEUROPATHY: Primary | ICD-10-CM

## 2025-02-11 RX ORDER — AMITRIPTYLINE HYDROCHLORIDE 10 MG/1
10 TABLET ORAL
Qty: 90 TABLET | Refills: 1 | Status: SHIPPED | OUTPATIENT
Start: 2025-02-11

## 2025-02-11 RX ORDER — LOSARTAN POTASSIUM 25 MG/1
25 TABLET ORAL DAILY
Qty: 90 TABLET | Refills: 1 | Status: SHIPPED | OUTPATIENT
Start: 2025-02-11

## 2025-02-11 NOTE — TELEPHONE ENCOUNTER
"Caller: Bala Staley III \"[place]\"    Relationship: Self    Best call back number: [phone]     What is the medical concern/diagnosis: NEUROPATHY    What specialty or service is being requested: PODIATRY    What is the provider, practice or medical service name: N/A    What is the office location: N/A    What is the office phone number: N/A    Any additional details: PATIENT WANTS TO MAKE SURE THAT HE IS SEEN WITH SOMEONE IN [place].       "

## 2025-02-11 NOTE — TELEPHONE ENCOUNTER
"Caller: Luís Bala BOONE III \"[place]\"    Relationship: Self    Best call back number: [phone]     Requested Prescriptions:   Requested Prescriptions     Pending Prescriptions Disp Refills    losartan (COZAAR) 25 MG tablet 90 tablet 1     Sig: Take 1 tablet by mouth Daily.    amitriptyline (ELAVIL) 10 MG tablet       Sig: Take 1 tablet by mouth every night at bedtime.        Pharmacy where request should be sent: [facility]Y #[id number] - [place], [place] - [address]  - [phone]  - [phone] FX     Last office visit with prescribing clinician: 1/23/2025   Last telemedicine visit with prescribing clinician: Visit date not found   Next office visit with prescribing clinician: 4/18/2025     Additional details provided by patient: PATIENT IS OUT OF THE MEDICATIONS.     Does the patient have less than a 3 day supply:  [x] Yes  [] No    Would you like a call back once the refill request has been completed: [x] Yes [] No    If the office needs to give you a call back, can they leave a voicemail: [x] Yes [] No    Brooke Low Rep   02/11/25 15:51 EST         "

## 2025-02-12 NOTE — TELEPHONE ENCOUNTER
Let him know [facility] does not have a  podiatrist, but we do have podiatrists we work with.  He previously saw Dr Infante, is he asking to see someone different?

## 2025-02-12 NOTE — TELEPHONE ENCOUNTER
Pt feels like he was not treated very well at Dr. Alves office.  He would like to see a new podiatrist.  Please advise.

## 2025-02-24 ENCOUNTER — OFFICE VISIT (OUTPATIENT)
Dept: INTERNAL MEDICINE | Facility: CLINIC | Age: 65
End: 2025-02-24
Payer: COMMERCIAL

## 2025-02-24 VITALS
DIASTOLIC BLOOD PRESSURE: 90 MMHG | TEMPERATURE: 98 F | SYSTOLIC BLOOD PRESSURE: 140 MMHG | BODY MASS INDEX: 33.46 KG/M2 | HEIGHT: 71 IN | HEART RATE: 77 BPM | WEIGHT: 239 LBS | OXYGEN SATURATION: 99 %

## 2025-02-24 DIAGNOSIS — E78.2 MIXED HYPERLIPIDEMIA: ICD-10-CM

## 2025-02-24 DIAGNOSIS — I10 ESSENTIAL HYPERTENSION: ICD-10-CM

## 2025-02-24 DIAGNOSIS — N63.0 BREAST MASS IN MALE: Primary | ICD-10-CM

## 2025-02-24 PROCEDURE — 99214 OFFICE O/P EST MOD 30 MIN: CPT | Performed by: STUDENT IN AN ORGANIZED HEALTH CARE EDUCATION/TRAINING PROGRAM

## 2025-02-24 RX ORDER — SPIRONOLACTONE 25 MG/1
25 TABLET ORAL DAILY
Qty: 30 TABLET | Refills: 0 | Status: SHIPPED | OUTPATIENT
Start: 2025-02-24 | End: 2025-03-26

## 2025-02-24 RX ORDER — LOSARTAN POTASSIUM 25 MG/1
25 TABLET ORAL DAILY
Qty: 30 TABLET | Refills: 0 | Status: SHIPPED | OUTPATIENT
Start: 2025-02-24 | End: 2025-03-26

## 2025-02-24 RX ORDER — METOPROLOL SUCCINATE 25 MG/1
25 TABLET, EXTENDED RELEASE ORAL DAILY
Qty: 30 TABLET | Refills: 0 | Status: SHIPPED | OUTPATIENT
Start: 2025-02-24 | End: 2025-02-24

## 2025-02-24 RX ORDER — METOPROLOL SUCCINATE 25 MG/1
25 TABLET, EXTENDED RELEASE ORAL DAILY
Qty: 90 TABLET | Refills: 1 | Status: SHIPPED | OUTPATIENT
Start: 2025-02-24

## 2025-02-24 RX ORDER — ATORVASTATIN CALCIUM 40 MG/1
40 TABLET, FILM COATED ORAL DAILY
Qty: 90 TABLET | Refills: 3 | Status: SHIPPED | OUTPATIENT
Start: 2025-02-24

## 2025-02-24 NOTE — PROGRESS NOTES
Office Note     Name: Bala Staley III    : 1960     MRN: [id number]     Chief Complaint  Breast Mass (R)    Subjective     History of Present Illness:  Bala Staley III is a 64 y.o. male who presents today for breast tenderness    History of Present Illness  The patient is presenting for evaluation of a painful lump in his right breast.    He reports discovering the lump approximately 2 weeks ago during a shower. He describes the sensation as a soreness, with the right side being more affected than the left. He has not observed any changes in the skin or any discharge from the nipple. He also reports no pain in the axillary region. He expresses concern about the possibility of breast cancer, citing a friend's experience as a reference point.    He has been on spironolactone for about a year, taking one 25 mg tablet daily. He used to monitor his blood pressure at home but has not been doing so recently.    Review of Systems:   Review of Systems   Constitutional:  Negative for chills and fever.   All other systems reviewed and are negative.      Past Medical History:   Past Medical History:   Diagnosis Date    Anemia     iron deficiency    Arthritis of back     Chronic low back pain 2018    Colonic polyp     Difficulty walking     ballence issue    Difficulty walking     Dizziness     ED (erectile dysfunction)     Environmental allergies     Esophagitis     Essential hypertension 2018    Gastroesophageal reflux disease without esophagitis 2018    Gout     Hernia of abdominal cavity     Hip arthrosis     HL (hearing loss) 2010    left ear    Hyperlipidemia     Hypertension     Low back strain     off and on    Periarthritis of shoulder     Reactive depression 2018    Rotator cuff syndrome     Tennis elbow     Vasovagal syncope 2019    Vitamin D deficiency 2018    Weakness        Past Surgical History:   Past Surgical History:   Procedure  Laterality Date    APPENDECTOMY      CARDIAC CATHETERIZATION  2011    COLONOSCOPY      ELBOW ARTHROPLASTY      Right     ELBOW PROCEDURE  2003    ENDOSCOPY  2011    ENDOSCOPY  2008    with biopsy    ESOPHAGEAL MOTILITY STUDY N/A 06/27/2019    Procedure: MANOMETRY;  Surgeon: Mark Lowery MD;  Location: [facility]placed by [facility] ENDOSCOPY;  Service: Gastroenterology    FOOT SURGERY      left foot     HAND SURGERY  2022    UPPER GASTROINTESTINAL ENDOSCOPY      WRIST SURGERY  2022       Family History:   Family History   Problem Relation Age of Onset    Alzheimer's disease Mother     Dementia Mother     Hypertension Mother     High cholesterol Mother     Colon polyps Father     Heart disease Father     Heart attack Father     Colon cancer Father     Coronary artery disease Father     Lung disease Sister     No Known Problems Maternal Grandmother     Alzheimer's disease Maternal Grandfather     Cancer Paternal Grandmother     Lung cancer Paternal Grandmother     Heart disease Paternal Grandfather     Heart disease Son     Esophageal cancer Neg Hx        Social History:   Social History     Socioeconomic History    Marital status:    Tobacco Use    Smoking status: Never     Passive exposure: Never    Smokeless tobacco: Former     Types: Chew     Quit date: 2002   Vaping Use    Vaping status: Never Used   Substance and Sexual Activity    Alcohol use: Yes     Alcohol/week: 14.0 standard drinks of alcohol     Types: 14 Shots of liquor per week     Comment: per day, [place]     Drug use: No    Sexual activity: Yes     Partners: Female     Birth control/protection: None       Immunizations:   Immunization History   Administered Date(s) Administered    COVID-19 (PFIZER) Purple Cap Monovalent 03/11/2021, 04/27/2021, 01/04/2022    Flu Vaccine Intradermal Quad 18-64YR 10/25/2018    Flu Vaccine Quad PF >36MO 10/04/2017    Flublok 18+yrs 11/30/2021, 12/09/2022    Fluzone  >6mos 11/25/2015    Fluzone (or Fluarix & Flulaval for VFC) >6mos  09/18/2020, 10/14/2023    Fluzone Quad >6mos (Multi-dose) 11/25/2015    Hepatitis A 09/05/2019, 09/18/2020    Influenza TIV (IM) 09/08/2009, 10/31/2014    Influenza, Unspecified 10/25/2018    Tdap 03/03/2011, 07/17/2014    Zostavax 11/25/2015    flucelvax quad pfs =>4 YRS 10/11/2019        Medications:     Current Outpatient Medications:     atorvastatin (Lipitor) 40 MG tablet, Take 1 tablet by mouth Daily., Disp: 90 tablet, Rfl: 3    losartan (COZAAR) 25 MG tablet, Take 1 tablet by mouth Daily for 30 days., Disp: 30 tablet, Rfl: 0    metoprolol succinate XL (TOPROL-XL) 25 MG 24 hr tablet, Take 1 tablet by mouth Daily for 30 days., Disp: 30 tablet, Rfl: 0    spironolactone (ALDACTONE) 25 MG tablet, Take 1 tablet by mouth Daily for 30 days., Disp: 30 tablet, Rfl: 0    allopurinol (ZYLOPRIM) 100 MG tablet, TAKE 1 TABLET BY MOUTH EVERY DAY, Disp: 90 tablet, Rfl: 1    amitriptyline (ELAVIL) 10 MG tablet, Take 1 tablet by mouth every night at bedtime., Disp: 90 tablet, Rfl: 1    ASCORBIC ACID PO, Take 1 tablet by mouth Daily., Disp: , Rfl:     ciprofloxacin (Cipro) 500 MG tablet, Take 1 tablet by mouth 2 (Two) Times a Day., Disp: 20 tablet, Rfl: 0    colchicine 0.6 MG tablet, TAKE 1 TABLET BY MOUTH EVERY DAY, Disp: 90 tablet, Rfl: 1    [facility]S Aspirin Low Dose 81 MG EC tablet, TAKE 1 TABLET BY MOUTH EVERY DAY (Patient not taking: Reported on 2/24/2025), Disp: 90 tablet, Rfl: 3    esomeprazole (NexIUM) 40 MG packet, Take 1 capsule by mouth Every Night., Disp: , Rfl:     ferrous sulfate 325 (65 FE) MG EC tablet, Take 1 tablet by mouth Daily With Breakfast., Disp: 90 tablet, Rfl: 1    folic acid-vit B6-vit B12 (FOLGARD) 2.2-25-1 MG tablet tablet, Take  by mouth Daily., Disp: , Rfl:     furosemide (LASIX) 20 MG tablet, TAKE 1 TABLET BY MOUTH TWICE A DAY, Disp: 180 tablet, Rfl: 1    gabapentin (NEURONTIN) 300 MG capsule, Take 2 tablets in the morning, 1 midday, 2 at bedtime, Disp: 150 capsule, Rfl: 3    Klor-Con M20 20 MEQ CR  "tablet, TAKE 1 TABLET BY MOUTH EVERY DAY, Disp: 90 tablet, Rfl: 1    MAGNESIUM OXIDE PO, Take 250 mg by mouth Daily. (Patient not taking: Reported on 2/24/2025), Disp: , Rfl:     vitamin B-12 (CYANOCOBALAMIN) 1000 MCG tablet, Take 1 tablet by mouth Daily., Disp: , Rfl:     vitamin D (ERGOCALCIFEROL) 1.25 MG (79647 UT) capsule capsule, Take 1 capsule by mouth Every 7 (Seven) Days., Disp: 5 capsule, Rfl: 5    Allergies:   Allergies   Allergen Reactions    Contrast Dye (Echo Or Unknown Ct/Mr) Rash       Objective     Vital Signs  /90 (BP Location: Left arm, Patient Position: Sitting, Cuff Size: Adult)   Pulse 77   Temp 98 °F (36.7 °C) (Infrared)   Ht 180.3 cm (70.98\")   Wt 108 kg (239 lb)   SpO2 99%   BMI 33.35 kg/m²   Body mass index is 33.35 kg/m².            Physical Exam  Constitutional:       Appearance: Normal appearance.   HENT:      Head: Normocephalic and atraumatic.   Eyes:      Extraocular Movements: Extraocular movements intact.      Conjunctiva/sclera: Conjunctivae normal.   Chest:          Comments: Bilateral chest tenderness, most prominent in area outlined above. No overlying skin changes  Neurological:      General: No focal deficit present.      Mental Status: He is alert and oriented to person, place, and time.   Psychiatric:         Mood and Affect: Mood normal.         Behavior: Behavior normal.         Assessment and Plan     Assessment/Plan:  Diagnoses and all orders for this visit:    1. Breast mass in male (Primary)  -Patient presents for evaluation of breast mass noticed during a shower.  He also notes bilateral breast tenderness.    -Will obtain diagnostic mammogram for further evaluation to rule out malignancy.  If imaging is negative may consider spironolactone as contributing to gynecomastia causing his symptoms.  -     Mammo Diagnostic Digital Tomosynthesis Bilateral With CAD; Future    2. Essential hypertension  -Patient is requesting refills of his blood pressure " medication  -     losartan (COZAAR) 25 MG tablet; Take 1 tablet by mouth Daily for 30 days.  Dispense: 30 tablet; Refill: 0  -     metoprolol succinate XL (TOPROL-XL) 25 MG 24 hr tablet; Take 1 tablet by mouth Daily for 30 days.  Dispense: 30 tablet; Refill: 0  -     spironolactone (ALDACTONE) 25 MG tablet; Take 1 tablet by mouth Daily for 30 days.  Dispense: 30 tablet; Refill: 0    3. Mixed hyperlipidemia  -Patient is requesting refills of his cholesterol medication  -     atorvastatin (Lipitor) 40 MG tablet; Take 1 tablet by mouth Daily.  Dispense: 90 tablet; Refill: 3    Follow Up  No follow-ups on file.    Patient or patient representative verbalized consent for the use of Ambient Listening during the visit with  Kusum Dominguez MD for chart documentation. 2/24/2025  13:32 STONEY Dominguez MD   [facility] [place] [id number]

## 2025-03-11 ENCOUNTER — HOSPITAL ENCOUNTER (OUTPATIENT)
Dept: MAMMOGRAPHY | Facility: HOSPITAL | Age: 65
Discharge: HOME | End: 2025-03-11
Payer: COMMERCIAL

## 2025-03-11 ENCOUNTER — HOSPITAL ENCOUNTER (OUTPATIENT)
Dept: ULTRASOUND IMAGING | Facility: HOSPITAL | Age: 65
Discharge: HOME | End: 2025-03-11
Payer: COMMERCIAL

## 2025-03-11 DIAGNOSIS — N63.0 BREAST MASS IN MALE: ICD-10-CM

## 2025-03-11 PROCEDURE — 77066 DX MAMMO INCL CAD BI: CPT

## 2025-03-11 PROCEDURE — 76642 ULTRASOUND BREAST LIMITED: CPT

## 2025-03-11 PROCEDURE — G0279 TOMOSYNTHESIS, MAMMO: HCPCS

## 2025-03-11 RX ORDER — POTASSIUM CHLORIDE 1500 MG/1
20 TABLET, EXTENDED RELEASE ORAL DAILY
Qty: 90 TABLET | Refills: 1 | Status: SHIPPED | OUTPATIENT
Start: 2025-03-11

## 2025-03-15 DIAGNOSIS — I10 ESSENTIAL HYPERTENSION: ICD-10-CM

## 2025-03-17 RX ORDER — SPIRONOLACTONE 25 MG/1
25 TABLET ORAL DAILY
Qty: 90 TABLET | Refills: 3 | Status: SHIPPED | OUTPATIENT
Start: 2025-03-17

## 2025-03-17 NOTE — TELEPHONE ENCOUNTER
Rx Refill Note  Requested Prescriptions     Pending Prescriptions Disp Refills    spironolactone (ALDACTONE) 25 MG tablet [Pharmacy Med Name: SPIRONOLACTONE 25 MG TABLET] 90 tablet 3     Sig: TAKE 1 TABLET BY MOUTH EVERY DAY      Last office visit with prescribing clinician: 1/23/2025   Last telemedicine visit with prescribing clinician: Visit date not found   Next office visit with prescribing clinician: 4/18/2025                         Would you like a call back once the refill request has been completed: [] Yes [] No    If the office needs to give you a call back, can they leave a voicemail: [] Yes [] No    Carolina Maharaj MA  03/17/25, 07:51 EDT

## 2025-06-09 RX ORDER — ERGOCALCIFEROL 1.25 MG/1
50000 CAPSULE, LIQUID FILLED ORAL WEEKLY
Qty: 5 CAPSULE | Refills: 5 | Status: SHIPPED | OUTPATIENT
Start: 2025-06-09

## 2025-06-10 ENCOUNTER — TELEPHONE (OUTPATIENT)
Dept: INTERNAL MEDICINE | Facility: CLINIC | Age: 65
End: 2025-06-10

## 2025-06-10 NOTE — TELEPHONE ENCOUNTER
"Caller: Bala Staley III \"[place]\"    Relationship: Self    Best call back number: [phone]     What orders are you requesting (i.e. lab or imaging): CT SCAN    In what timeframe would the patient need to come in: AS SOON AS POSSIBLE    Additional notes: LOUD SNAPS IN NECK DURING PHYSICAL THERAPY, OCCURRING WHEN MOVING LEFT TO RIGHT, HIGHLY PAINFUL, MORE SNAPS THAN BEFORE. PHYSICAL THERAPY WANTS THIS IMAGING.        "

## 2025-06-11 ENCOUNTER — OFFICE VISIT (OUTPATIENT)
Dept: INTERNAL MEDICINE | Facility: CLINIC | Age: 65
End: 2025-06-11
Payer: MEDICARE

## 2025-06-11 VITALS
SYSTOLIC BLOOD PRESSURE: 128 MMHG | DIASTOLIC BLOOD PRESSURE: 72 MMHG | HEIGHT: 71 IN | OXYGEN SATURATION: 96 % | HEART RATE: 100 BPM | BODY MASS INDEX: 32.9 KG/M2 | WEIGHT: 235 LBS

## 2025-06-11 DIAGNOSIS — M47.22 OSTEOARTHRITIS OF SPINE WITH RADICULOPATHY, CERVICAL REGION: Primary | Chronic | ICD-10-CM

## 2025-06-11 DIAGNOSIS — G62.9 PERIPHERAL POLYNEUROPATHY: Chronic | ICD-10-CM

## 2025-06-11 DIAGNOSIS — R26.89 POOR BALANCE: ICD-10-CM

## 2025-06-11 DIAGNOSIS — M54.2 NECK PAIN: ICD-10-CM

## 2025-06-11 PROCEDURE — 3078F DIAST BP <80 MM HG: CPT | Performed by: STUDENT IN AN ORGANIZED HEALTH CARE EDUCATION/TRAINING PROGRAM

## 2025-06-11 PROCEDURE — 99214 OFFICE O/P EST MOD 30 MIN: CPT | Performed by: STUDENT IN AN ORGANIZED HEALTH CARE EDUCATION/TRAINING PROGRAM

## 2025-06-11 PROCEDURE — 1126F AMNT PAIN NOTED NONE PRSNT: CPT | Performed by: STUDENT IN AN ORGANIZED HEALTH CARE EDUCATION/TRAINING PROGRAM

## 2025-06-11 PROCEDURE — 3074F SYST BP LT 130 MM HG: CPT | Performed by: STUDENT IN AN ORGANIZED HEALTH CARE EDUCATION/TRAINING PROGRAM

## 2025-06-11 PROCEDURE — 1160F RVW MEDS BY RX/DR IN RCRD: CPT | Performed by: STUDENT IN AN ORGANIZED HEALTH CARE EDUCATION/TRAINING PROGRAM

## 2025-06-11 PROCEDURE — 1159F MED LIST DOCD IN RCRD: CPT | Performed by: STUDENT IN AN ORGANIZED HEALTH CARE EDUCATION/TRAINING PROGRAM

## 2025-06-11 NOTE — PROGRESS NOTES
"Chief Complaint  Bala Staley III is a 65 y.o. male presenting for neck problems (Has been doing PT for strengthening in his neck.  Is having large \"snaps\" in his neck, both left and right side. Denies radiculopathy).     Patient has a past medical history of hypertension, hyperlipidemia, allergic rhinitis, asthma, vitamin D deficiency, GERD, DUPREE, UTI, prostatitis, iron deficiency anemia, depression, gout and spinal DJD.    History of Present Illness  Patient is here for evaluation due to worsening neck pain.    He saw Dr. Kim in January of this year, did x-ray of his neck showing degenerative changes.  He reports worsening neck pain despite doing physical therapy for more than 2 months.  He is reporting snapping and popping of his neck when moving to the sides, several times daily.  They have been  giving him exercises.  He has noticed pain shooting out left neck towards the shoulder, also on the right side.  No weakness or numbness or tingling of hands.  No lower extremity weakness, but does have neuropathy of his feet at baseline.  Of note he did have a fall last year, he fell down 14 steps.  X-ray done in January did not show any sign of fracture.    Patient is concerned about the development of his neck issues, and was asking about CT of the neck.  He is amenable to MRI.    Of note he did see neurosurgery Dr. Paul last year for his lumbar spine, noted with multilevel degenerative disc disease.    The following portions of the patient's history were reviewed and updated as appropriate: allergies, current medications, past family history, past medical history, past social history, past surgical history, and problem list.    Objective  /72 (BP Location: Left arm, Patient Position: Sitting, Cuff Size: Adult)   Pulse 100   Ht 180.3 cm (71\")   Wt 107 kg (235 lb)   SpO2 96%   BMI 32.78 kg/m²     Physical Exam  Vitals reviewed.   Constitutional:       Appearance: Normal appearance.   HENT:      " Head: Normocephalic and atraumatic.      Nose: Nose normal. No congestion.      Mouth/Throat:      Mouth: Mucous membranes are moist.   Eyes:      Extraocular Movements: Extraocular movements intact.      Conjunctiva/sclera: Conjunctivae normal.   Neck:      Comments: Fairly good ROM, but tender  at endpoints.  Spurling test on left side causes pain shooting down the left neck towards the shoulder, similar to the right side.  Cardiovascular:      Rate and Rhythm: Normal rate and regular rhythm.      Heart sounds: Normal heart sounds. No murmur heard.  Pulmonary:      Effort: Pulmonary effort is normal. No respiratory distress.      Breath sounds: Normal breath sounds. No wheezing.   Abdominal:      Tenderness: There is no abdominal tenderness.   Musculoskeletal:      Cervical back: Neck supple.   Skin:     General: Skin is warm and dry.   Neurological:      Mental Status: He is alert and oriented to person, place, and time. Mental status is at baseline.      Motor: No weakness.      Gait: Gait normal.      Comments: Mildly decreased balance when getting on and off the exam table.  No weakness noted.   Psychiatric:         Behavior: Behavior normal.         Thought Content: Thought content normal.         Assessment/Plan   1. Osteoarthritis of spine with radiculopathy, cervical region  2. Poor balance  3. Neck pain  4. Peripheral polyneuropathy  As far as symptoms of popping, may not be indication of any serious underlying condition.  However recommend MRI instead of CT to evaluate for cord compression versus nerve compression.  Continue activity and exercise as tolerated.  Recommend follow-up with Dr. Kim after MRI.  He is also due for AWV.  - MRI Cervical Spine Without Contrast; Future    Total time spent on chart review, charting and face-to-face with patient 30 minutes.    Return if symptoms worsen or fail to improve, for Medicare Wellness w/PCP,.    Future Appointments         Provider Department Center     7/28/2025 2:00 PM (Arrive by 1:45 PM) Wilfred Kim MD [facility] INTERNAL MEDICINE [place]              Noam Garcia MD  Family Medicine  06/11/2025

## 2025-06-16 DIAGNOSIS — I10 ESSENTIAL HYPERTENSION: ICD-10-CM

## 2025-06-16 RX ORDER — METOPROLOL SUCCINATE 25 MG/1
25 TABLET, EXTENDED RELEASE ORAL DAILY
Qty: 90 TABLET | Refills: 1 | Status: SHIPPED | OUTPATIENT
Start: 2025-06-16

## 2025-06-16 NOTE — TELEPHONE ENCOUNTER
Rx Refill Note  Requested Prescriptions     Pending Prescriptions Disp Refills    metoprolol succinate XL (TOPROL-XL) 25 MG 24 hr tablet 90 tablet 1     Sig: Take 1 tablet by mouth Daily.      Last office visit with prescribing clinician: 1/23/2025   Last telemedicine visit with prescribing clinician: Visit date not found   Next office visit with prescribing clinician: 7/28/2025                         Would you like a call back once the refill request has been completed: [] Yes [] No    If the office needs to give you a call back, can they leave a voicemail: [] Yes [] No    Layne Salazar MA  06/16/25, 09:54 EDT

## 2025-06-16 NOTE — TELEPHONE ENCOUNTER
"Caller: Bala Staley III \"Mueller\"    Relationship: Self    Best call back number: [phone]     Requested Prescriptions:   Requested Prescriptions     Pending Prescriptions Disp Refills    metoprolol succinate XL (TOPROL-XL) 25 MG 24 hr tablet 90 tablet 1     Sig: Take 1 tablet by mouth Daily.        Pharmacy where request should be sent: [facility] #[id number] - [place], [place] - [address]  - [phone]  - [phone] FX     Last office visit with prescribing clinician: 1/23/2025   Last telemedicine visit with prescribing clinician: Visit date not found   Next office visit with prescribing clinician: 7/28/2025     Additional details provided by patient: PATIENT HAS CALLED REQUESTING A REFILL ON ABOVE MEDICATION.     Does the patient have less than a 3 day supply:  [x] Yes  [] No    Would you like a call back once the refill request has been completed: [] Yes [x] No    If the office needs to give you a call back, can they leave a voicemail: [] Yes [x] No    Sarah Roth   06/16/25 09:49 EDT           "

## 2025-06-18 RX ORDER — FUROSEMIDE 20 MG/1
20 TABLET ORAL EVERY 12 HOURS SCHEDULED
Qty: 180 TABLET | Refills: 1 | Status: SHIPPED | OUTPATIENT
Start: 2025-06-18

## 2025-07-01 ENCOUNTER — HOSPITAL ENCOUNTER (OUTPATIENT)
Dept: MRI IMAGING | Facility: HOSPITAL | Age: 65
Discharge: HOME | End: 2025-07-01
Admitting: STUDENT IN AN ORGANIZED HEALTH CARE EDUCATION/TRAINING PROGRAM
Payer: MEDICARE

## 2025-07-01 DIAGNOSIS — G62.9 PERIPHERAL POLYNEUROPATHY: Chronic | ICD-10-CM

## 2025-07-01 DIAGNOSIS — R26.89 POOR BALANCE: ICD-10-CM

## 2025-07-01 DIAGNOSIS — M47.22 OSTEOARTHRITIS OF SPINE WITH RADICULOPATHY, CERVICAL REGION: Chronic | ICD-10-CM

## 2025-07-01 DIAGNOSIS — M54.2 NECK PAIN: ICD-10-CM

## 2025-07-01 PROCEDURE — 72141 MRI NECK SPINE W/O DYE: CPT

## 2025-07-01 RX ORDER — ALLOPURINOL 100 MG/1
100 TABLET ORAL DAILY
Qty: 90 TABLET | Refills: 1 | Status: SHIPPED | OUTPATIENT
Start: 2025-07-01

## 2025-07-01 NOTE — TELEPHONE ENCOUNTER
Rx Refill Note  Requested Prescriptions     Pending Prescriptions Disp Refills    allopurinol (ZYLOPRIM) 100 MG tablet [Pharmacy Med Name: ALLOPURINOL 100 MG TABLET] 90 tablet 1     Sig: TAKE 1 TABLET BY MOUTH EVERY DAY      Last office visit with prescribing clinician: 1/23/2025   Last telemedicine visit with prescribing clinician: Visit date not found   Next office visit with prescribing clinician: 7/28/2025                         Would you like a call back once the refill request has been completed: [] Yes [] No    If the office needs to give you a call back, can they leave a voicemail: [] Yes [] No    Layne Salazar MA  07/01/25, 08:13 EDT

## 2025-07-28 ENCOUNTER — OFFICE VISIT (OUTPATIENT)
Dept: INTERNAL MEDICINE | Facility: CLINIC | Age: 65
End: 2025-07-28
Payer: MEDICARE

## 2025-07-28 VITALS
HEART RATE: 84 BPM | OXYGEN SATURATION: 95 % | WEIGHT: 239.4 LBS | DIASTOLIC BLOOD PRESSURE: 80 MMHG | TEMPERATURE: 98 F | SYSTOLIC BLOOD PRESSURE: 126 MMHG | BODY MASS INDEX: 33.52 KG/M2 | HEIGHT: 71 IN

## 2025-07-28 DIAGNOSIS — M47.812 CERVICAL SPONDYLOSIS: ICD-10-CM

## 2025-07-28 DIAGNOSIS — M10.9 ACUTE GOUT INVOLVING TOE OF LEFT FOOT, UNSPECIFIED CAUSE: ICD-10-CM

## 2025-07-28 DIAGNOSIS — R73.09 ABNORMAL GLUCOSE: ICD-10-CM

## 2025-07-28 DIAGNOSIS — I10 ESSENTIAL HYPERTENSION: ICD-10-CM

## 2025-07-28 DIAGNOSIS — M47.816 LUMBAR SPONDYLOSIS: ICD-10-CM

## 2025-07-28 DIAGNOSIS — Z00.00 WELCOME TO MEDICARE PREVENTIVE VISIT: Primary | ICD-10-CM

## 2025-07-28 DIAGNOSIS — E78.2 MIXED HYPERLIPIDEMIA: ICD-10-CM

## 2025-07-28 DIAGNOSIS — K76.0 NON-ALCOHOLIC FATTY LIVER DISEASE: ICD-10-CM

## 2025-07-28 DIAGNOSIS — M1A.09X0 IDIOPATHIC CHRONIC GOUT OF MULTIPLE SITES WITHOUT TOPHUS: ICD-10-CM

## 2025-07-28 RX ORDER — MECOBAL/LEVOMEFOLAT CA/B6 PHOS 2-3-35 MG
1 TABLET ORAL DAILY
COMMUNITY

## 2025-07-28 NOTE — PROGRESS NOTES
Subjective   The ABCs of the Annual Wellness Visit  Medicare Wellness Visit      Bala Staley III is a 65 y.o. patient who presents for a Medicare Wellness Visit.    The following portions of the patient's history were reviewed and   updated as appropriate: allergies, current medications, past family history, past medical history, past social history, past surgical history, and problem list.    Compared to one year ago, the patient's physical   health is the same.  Compared to one year ago, the patient's mental   health is the same.    Recent Hospitalizations:  He was not admitted to the hospital during the last year.     Current Medical Providers:  Patient Care Team:  Wilfred Kim MD as PCP - General (Internal Medicine)  Pawel Leahy MD as Consulting Physician (Cardiology)    Outpatient Medications Prior to Visit   Medication Sig Dispense Refill    allopurinol (ZYLOPRIM) 100 MG tablet TAKE 1 TABLET BY MOUTH EVERY DAY 90 tablet 1    ASCORBIC ACID PO Take 1 tablet by mouth Daily.      atorvastatin (Lipitor) 40 MG tablet Take 1 tablet by mouth Daily. 90 tablet 3    esomeprazole (NexIUM) 40 MG packet Take 1 capsule by mouth Every Night.      folic acid-vit B6-vit B12 (FOLGARD) 2.2-25-1 MG tablet tablet Take  by mouth Daily.      furosemide (LASIX) 20 MG tablet TAKE 1 TABLET BY MOUTH TWICE A  tablet 1    gabapentin (NEURONTIN) 300 MG capsule Take 2 tablets in the morning, 1 midday, 2 at bedtime 150 capsule 3    Klor-Con M20 20 MEQ CR tablet TAKE 1 TABLET BY MOUTH EVERY DAY 90 tablet 1    L-Methylfolate-B6-B12 3-35-2 MG tablet Take 1 tablet by mouth Daily.      metoprolol succinate XL (TOPROL-XL) 25 MG 24 hr tablet Take 1 tablet by mouth Daily. 90 tablet 1    spironolactone (ALDACTONE) 25 MG tablet TAKE 1 TABLET BY MOUTH EVERY DAY 90 tablet 3    vitamin B-12 (CYANOCOBALAMIN) 1000 MCG tablet Take 1 tablet by mouth Daily.      vitamin D (ERGOCALCIFEROL) 1.25 MG (82346 UT) capsule capsule TAKE 1  CAPSULE BY MOUTH EVERY 7 DAYS. 5 capsule 5    losartan (COZAAR) 25 MG tablet Take 1 tablet by mouth Daily for 30 days. 30 tablet 0    amitriptyline (ELAVIL) 10 MG tablet Take 1 tablet by mouth every night at bedtime. (Patient not taking: Reported on 7/28/2025) 90 tablet 1    colchicine 0.6 MG tablet TAKE 1 TABLET BY MOUTH EVERY DAY (Patient not taking: Reported on 7/28/2025) 90 tablet 1    [employer] Aspirin Low Dose 81 MG EC tablet TAKE 1 TABLET BY MOUTH EVERY DAY (Patient not taking: Reported on 2/24/2025) 90 tablet 3    ferrous sulfate 325 (65 FE) MG EC tablet Take 1 tablet by mouth Daily With Breakfast. (Patient not taking: Reported on 7/28/2025) 90 tablet 1    MAGNESIUM OXIDE PO Take 250 mg by mouth Daily. (Patient not taking: Reported on 7/28/2025)       No facility-administered medications prior to visit.     No opioid medication identified on active medication list. I have reviewed chart for other potential  high risk medication/s and harmful drug interactions in the elderly.      Aspirin is on active medication list. Aspirin use is indicated based on review of current medical condition/s. Pros and cons of this therapy have been discussed today. Benefits of this medication outweigh potential harm.  Patient has been encouraged to continue taking this medication.  .      Patient Active Problem List   Diagnosis    Essential hypertension    Mixed hyperlipidemia    Class 2 obesity due to excess calories without serious comorbidity in adult    Reactive depression    Gastroesophageal reflux disease without esophagitis    Vitamin D deficiency    Chronic low back pain    Dyspnea on exertion    Syncope, tussive    Hypokalemia    Cough syncope    Allergic rhinitis    Cough    Iron deficiency anemia    Vasovagal syncope    Hernia of abdominal cavity    Right sided sciatica    Non-alcoholic fatty liver disease    Acute gout involving toe of left foot    Idiopathic chronic gout of multiple sites without tophus    Difficulty  "voiding    Acute cystitis with hematuria    Acute prostatitis     Advance Care Planning Advance Directive is not on file.  ACP discussion was held with the patient during this visit. Patient has an advance directive (not in EMR), copy requested.            Objective   Vitals:    25 1411   BP: 126/80   BP Location: Left arm   Patient Position: Sitting   Cuff Size: Adult   Pulse: 84   Temp: 98 °F (36.7 °C)   TempSrc: Infrared   SpO2: 95%   Weight: 109 kg (239 lb 6.4 oz)   Height: 180.3 cm (70.98\")   PainSc: 6    PainLoc: Neck       Estimated body mass index is 33.4 kg/m² as calculated from the following:    Height as of this encounter: 180.3 cm (70.98\").    Weight as of this encounter: 109 kg (239 lb 6.4 oz).                Gait and Balance Evaluation:  Normal    Does the patient have evidence of cognitive impairment? No                                                                                                Health  Risk Assessment    Smoking Status:  Social History     Tobacco Use   Smoking Status Never    Passive exposure: Never   Smokeless Tobacco Former    Types: Chew    Quit date:      Alcohol Consumption:  Social History     Substance and Sexual Activity   Alcohol Use Yes    Alcohol/week: 2.0 standard drinks of alcohol    Types: 2 Shots of liquor per week    Comment: per day, [place]        Fall Risk Screen  STEADI Fall Risk Assessment was completed, and patient is at LOW risk for falls.Assessment completed on:2025    Depression Screening   Little interest or pleasure in doing things? Not at all   Feeling down, depressed, or hopeless? Not at all   PHQ-2 Total Score 0      Health Habits and Functional and Cognitive Screenin/28/2025     2:10 PM   Functional & Cognitive Status   Do you have difficulty preparing food and eating? No   Do you have difficulty bathing yourself, getting dressed or grooming yourself? No   Do you have difficulty using the toilet? No   Do you have difficulty " moving around from place to place? No   Do you have trouble with steps or getting out of a bed or a chair? Yes   Current Diet Well Balanced Diet   Dental Exam Up to date   Eye Exam Up to date   Exercise (times per week) 1 times per week   Current Exercises Include Walking   Do you need help using the phone?  No   Are you deaf or do you have serious difficulty hearing?  No   Do you need help to go to places out of walking distance? No   Do you need help shopping? No   Do you need help preparing meals?  No   Do you need help with housework?  No   Do you need help with laundry? No   Do you need help taking your medications? No   Do you need help managing money? No   Have you felt unusual fatigue (could be tiredness), stress, anger or loneliness in the last month? No   Who do you live with? Spouse   If you need help, do you have trouble finding someone available to you? No   Have you been bothered in the last four weeks by sexual problems? No   Do you have difficulty concentrating, remembering or making decisions? No           Visual Acuity:  Vision Screening    Right eye Left eye Both eyes   Without correction      With correction 20/25 20/40 20/25     Age-appropriate Screening Schedule:  Refer to the list below for future screening recommendations based on patient's age, sex and/or medical conditions. Orders for these recommended tests are listed in the plan section. The patient has been provided with a written plan.    Health Maintenance List  Health Maintenance   Topic Date Due    Pneumococcal Vaccine 50+ (1 of 2 - PCV) Never done    ZOSTER VACCINE (2 of 3) 01/20/2016    ANNUAL WELLNESS VISIT  Never done    TDAP/TD VACCINES (3 - Td or Tdap) 07/17/2024    COVID-19 Vaccine (4 - 2024-25 season) 09/01/2024    LIPID PANEL  09/27/2025    INFLUENZA VACCINE  10/01/2025    COLORECTAL CANCER SCREENING  10/01/2029    HEPATITIS C SCREENING  Completed                                                                                                                                                 CMS Preventative Services Quick Reference  Risk Factors Identified During Encounter  None Identified    The above risks/problems have been discussed with the patient.  Pertinent information has been shared with the patient in the After Visit Summary.  An After Visit Summary and PPPS were made available to the patient.    Follow Up:   Next Medicare Wellness visit to be scheduled in 1 year.     No opioid medication identified on active medication list. I have reviewed chart for other potential  high risk medication/s and harmful drug interactions in the elderly.      Additional E&M Note during same encounter follows:  Patient has additional, significant, and separately identifiable condition(s)/problem(s) that require work above and beyond the Medicare Wellness Visit     Chief Complaint  Welcome To Medicare, Hypertension, and Hyperlipidemia    Subjective    HPI         The patient presents for a Welcome to Medicare visit and follow-up of hypertension, hyperlipidemia, fatty liver disease, and gout.    He reports experiencing severe pain in his feet and neck, which he finds difficult to manage. He is currently undergoing physical therapy twice a week but has not found it helpful. He does not experience any issues with his arms. His physical activity is limited due to his condition, although he attempts to stay active by climbing stairs at his office and using a rubber device on his feet. He has been prescribed gabapentin, which he takes three times daily, and amitriptyline, which was added by a podiatrist. He does not report any side effects from these medications.    He has hypertension, which is controlled with losartan and metoprolol.    He has hyperlipidemia, with a lipid panel pending. He continues to take atorvastatin and follows a healthy diet.    He has nonalcoholic fatty liver disease, with treatment focused on efforts at weight loss. His BMI today  "is 33.    He has gout, which is currently asymptomatic, with uric acid levels pending.          Objective   Vital Signs:  /80 (BP Location: Left arm, Patient Position: Sitting, Cuff Size: Adult)   Pulse 84   Temp 98 °F (36.7 °C) (Infrared)   Ht 180.3 cm (70.98\")   Wt 109 kg (239 lb 6.4 oz)   SpO2 95%   BMI 33.40 kg/m²   Physical Exam  Vitals reviewed.   Constitutional:       Appearance: Normal appearance. He is well-developed.   HENT:      Head: Normocephalic and atraumatic.      Right Ear: Tympanic membrane and ear canal normal.      Left Ear: Tympanic membrane and ear canal normal.      Mouth/Throat:      Pharynx: Oropharynx is clear. No posterior oropharyngeal erythema.   Eyes:      Extraocular Movements: Extraocular movements intact.      Pupils: Pupils are equal, round, and reactive to light.   Neck:      Thyroid: No thyromegaly.      Vascular: No carotid bruit.   Cardiovascular:      Rate and Rhythm: Normal rate and regular rhythm.      Heart sounds: Normal heart sounds. No murmur heard.     No friction rub. No gallop.   Pulmonary:      Effort: Pulmonary effort is normal.      Breath sounds: Normal breath sounds.   Abdominal:      General: Bowel sounds are normal.      Palpations: Abdomen is soft. There is no mass.      Tenderness: There is no abdominal tenderness.   Musculoskeletal:      Cervical back: Normal range of motion and neck supple.      Right lower leg: No edema.      Left lower leg: No edema.   Lymphadenopathy:      Cervical: No cervical adenopathy.   Skin:     General: Skin is warm and dry.   Neurological:      Mental Status: He is alert and oriented to person, place, and time.      Cranial Nerves: No cranial nerve deficit.      Motor: No weakness.      Gait: Gait normal.   Psychiatric:         Mood and Affect: Mood normal.         Behavior: Behavior normal.                       Results  Imaging   - MRI of the spine: The MRI shows lots and lots of arthritis in the spine, similar to " the findings in the low back. No nerve involvement was noted.           Assessment and Plan        1. Health maintenance.  - Physical activity is significantly limited by lumbar and cervical spondylosis.  - Unable to lose weight.  - Up to date on colorectal cancer screening.  - Follow-up in 6 months.    2. Hypertension.  - Blood pressure is controlled on losartan and metoprolol.  - Blood pressure readings are within normal range.  - Continue current medications.  - Follow-up in 6 months.    3. Hyperlipidemia.  - Lipid panel is pending.  - Continue atorvastatin and healthy diet.  - No new symptoms reported.  - Follow-up in 6 months.    4. Nonalcoholic fatty liver disease.  - BMI today is 33.  - Lack of mobility due to spinal disease.  - Weight loss would be beneficial for mobility and strength.  - Follow-up in 6 months.    5. Gout.  - Currently asymptomatic.  - Uric acid test is pending.  - No new symptoms reported.  - Follow-up in 6 months.    Follow-up: The patient will follow up in 6 months.         Follow Up   Return in about 6 months (around 1/28/2026) for follow up.  Patient was given instructions and counseling regarding his condition or for health maintenance advice. Please see specific information pulled into the AVS if appropriate.  Patient or patient representative verbalized consent for the use of Ambient Listening during the visit with  Wilfred Kim MD for chart documentation. 7/31/2025  08:04 EDT

## 2025-07-30 RX ORDER — COLCHICINE 0.6 MG/1
TABLET ORAL
Qty: 90 TABLET | Refills: 1 | Status: SHIPPED | OUTPATIENT
Start: 2025-07-30

## 2025-08-01 RX ORDER — ALLOPURINOL 100 MG/1
100 TABLET ORAL DAILY
Qty: 90 TABLET | Refills: 1 | Status: SHIPPED | OUTPATIENT
Start: 2025-08-01

## 2025-08-01 NOTE — TELEPHONE ENCOUNTER
"Caller: Bala Staley III \"Mueller\"    Relationship: Self    Best call back number: [phone]     Requested Prescriptions:   Requested Prescriptions     Pending Prescriptions Disp Refills    allopurinol (ZYLOPRIM) 100 MG tablet 90 tablet 1     Sig: Take 1 tablet by mouth Daily.        Pharmacy where request should be sent: [facility] #[id number] - [place], [place] - [address] - [phone]  - [phone] FX     Last office visit with prescribing clinician: 7/28/2025   Last telemedicine visit with prescribing clinician: Visit date not found   Next office visit with prescribing clinician: 1/28/2026     Additional details provided by patient: PATIENT IS OUT.     Does the patient have less than a 3 day supply:  [x] Yes  [] No    Would you like a call back once the refill request has been completed: [] Yes [x] No    If the office needs to give you a call back, can they leave a voicemail: [] Yes [x] No    Cadance Dunaway, RegSched Rep   08/01/25 15:57 EDT         "

## 2025-08-18 DIAGNOSIS — G60.9 HEREDITARY AND IDIOPATHIC PERIPHERAL NEUROPATHY: ICD-10-CM

## 2025-08-18 RX ORDER — GABAPENTIN 300 MG/1
CAPSULE ORAL
Qty: 150 CAPSULE | Refills: 2 | Status: SHIPPED | OUTPATIENT
Start: 2025-08-18

## (undated) DEVICE — PK SOL VISC ESOPH 80ML